# Patient Record
Sex: MALE | Race: BLACK OR AFRICAN AMERICAN | Employment: UNEMPLOYED | URBAN - METROPOLITAN AREA
[De-identification: names, ages, dates, MRNs, and addresses within clinical notes are randomized per-mention and may not be internally consistent; named-entity substitution may affect disease eponyms.]

---

## 2020-01-01 ENCOUNTER — TELEPHONE (OUTPATIENT)
Dept: GASTROENTEROLOGY | Age: 60
End: 2020-01-01

## 2020-01-01 ENCOUNTER — ANESTHESIA EVENT (OUTPATIENT)
Dept: ICU | Age: 60
DRG: 130 | End: 2020-01-01
Payer: COMMERCIAL

## 2020-01-01 ENCOUNTER — APPOINTMENT (OUTPATIENT)
Dept: ULTRASOUND IMAGING | Age: 60
DRG: 130 | End: 2020-01-01
Payer: COMMERCIAL

## 2020-01-01 ENCOUNTER — APPOINTMENT (OUTPATIENT)
Dept: CT IMAGING | Age: 60
DRG: 130 | End: 2020-01-01
Payer: COMMERCIAL

## 2020-01-01 ENCOUNTER — APPOINTMENT (OUTPATIENT)
Dept: MRI IMAGING | Age: 60
DRG: 130 | End: 2020-01-01
Payer: COMMERCIAL

## 2020-01-01 ENCOUNTER — APPOINTMENT (OUTPATIENT)
Dept: GENERAL RADIOLOGY | Age: 60
DRG: 130 | End: 2020-01-01
Payer: COMMERCIAL

## 2020-01-01 ENCOUNTER — HOSPITAL ENCOUNTER (INPATIENT)
Age: 60
LOS: 12 days | DRG: 130 | End: 2020-08-08
Attending: EMERGENCY MEDICINE | Admitting: INTERNAL MEDICINE
Payer: COMMERCIAL

## 2020-01-01 ENCOUNTER — ANESTHESIA (OUTPATIENT)
Dept: ICU | Age: 60
DRG: 130 | End: 2020-01-01
Payer: COMMERCIAL

## 2020-01-01 VITALS
HEIGHT: 70 IN | WEIGHT: 165.3 LBS | TEMPERATURE: 99.7 F | HEART RATE: 28 BPM | RESPIRATION RATE: 14 BRPM | BODY MASS INDEX: 23.66 KG/M2 | DIASTOLIC BLOOD PRESSURE: 18 MMHG | OXYGEN SATURATION: 65 % | SYSTOLIC BLOOD PRESSURE: 35 MMHG

## 2020-01-01 LAB
-: ABNORMAL
-: NORMAL
ABO/RH: NORMAL
ABSOLUTE EOS #: 0.09 K/UL (ref 0–0.44)
ABSOLUTE EOS #: 0.11 K/UL (ref 0–0.4)
ABSOLUTE IMMATURE GRANULOCYTE: 0 K/UL (ref 0–0.3)
ABSOLUTE IMMATURE GRANULOCYTE: <0.03 K/UL (ref 0–0.3)
ABSOLUTE LYMPH #: 1.88 K/UL (ref 1.1–3.7)
ABSOLUTE LYMPH #: 6.39 K/UL (ref 1–4.8)
ABSOLUTE MONO #: 0.74 K/UL (ref 0.1–0.8)
ABSOLUTE MONO #: 0.83 K/UL (ref 0.1–1.2)
ACETAMINOPHEN LEVEL: <5 UG/ML (ref 10–30)
ACTION: NORMAL
ACTION: NORMAL
ALBUMIN (CALCULATED): 2.4 G/DL (ref 3.2–5.2)
ALBUMIN PERCENT: 56 % (ref 45–65)
ALBUMIN SERPL-MCNC: 3.8 G/DL (ref 3.5–5.2)
ALBUMIN SERPL-MCNC: 4.2 G/DL (ref 3.5–5.2)
ALBUMIN/GLOBULIN RATIO: 1.1 (ref 1–2.5)
ALBUMIN/GLOBULIN RATIO: 1.1 (ref 1–2.5)
ALLEN TEST: ABNORMAL
ALLEN TEST: NEGATIVE
ALP BLD-CCNC: 117 U/L (ref 40–129)
ALP BLD-CCNC: 126 U/L (ref 40–129)
ALPHA 1 PERCENT: 4 % (ref 3–6)
ALPHA 2 PERCENT: 8 % (ref 6–13)
ALPHA-1-GLOBULIN: 0.2 G/DL (ref 0.1–0.4)
ALPHA-2-GLOBULIN: 0.3 G/DL (ref 0.5–0.9)
ALT SERPL-CCNC: 18 U/L (ref 5–41)
ALT SERPL-CCNC: 27 U/L (ref 5–41)
AMORPHOUS: ABNORMAL
AMPHETAMINE SCREEN URINE: NEGATIVE
ANION GAP SERPL CALCULATED.3IONS-SCNC: 15 MMOL/L (ref 9–17)
ANION GAP SERPL CALCULATED.3IONS-SCNC: 15 MMOL/L (ref 9–17)
ANION GAP SERPL CALCULATED.3IONS-SCNC: 16 MMOL/L (ref 9–17)
ANION GAP SERPL CALCULATED.3IONS-SCNC: 16 MMOL/L (ref 9–17)
ANION GAP SERPL CALCULATED.3IONS-SCNC: 17 MMOL/L (ref 9–17)
ANION GAP SERPL CALCULATED.3IONS-SCNC: 17 MMOL/L (ref 9–17)
ANION GAP SERPL CALCULATED.3IONS-SCNC: 18 MMOL/L (ref 9–17)
ANION GAP SERPL CALCULATED.3IONS-SCNC: 19 MMOL/L (ref 9–17)
ANION GAP SERPL CALCULATED.3IONS-SCNC: 20 MMOL/L (ref 9–17)
ANION GAP SERPL CALCULATED.3IONS-SCNC: 20 MMOL/L (ref 9–17)
ANION GAP SERPL CALCULATED.3IONS-SCNC: 22 MMOL/L (ref 9–17)
ANION GAP SERPL CALCULATED.3IONS-SCNC: 23 MMOL/L (ref 9–17)
ANION GAP SERPL CALCULATED.3IONS-SCNC: 24 MMOL/L (ref 9–17)
ANION GAP SERPL CALCULATED.3IONS-SCNC: 24 MMOL/L (ref 9–17)
ANION GAP SERPL CALCULATED.3IONS-SCNC: 25 MMOL/L (ref 9–17)
ANION GAP SERPL CALCULATED.3IONS-SCNC: 26 MMOL/L (ref 9–17)
ANION GAP SERPL CALCULATED.3IONS-SCNC: 26 MMOL/L (ref 9–17)
ANION GAP SERPL CALCULATED.3IONS-SCNC: 29 MMOL/L (ref 9–17)
ANION GAP SERPL CALCULATED.3IONS-SCNC: 30 MMOL/L (ref 9–17)
ANION GAP SERPL CALCULATED.3IONS-SCNC: 31 MMOL/L (ref 9–17)
ANION GAP: 13 MMOL/L (ref 7–16)
ANION GAP: 19 MMOL/L (ref 7–16)
ANION GAP: NORMAL MMOL/L (ref 7–16)
ANTI-NUCLEAR ANTIBODY (ANA): NEGATIVE
ANTIBODY SCREEN: NEGATIVE
ARM BAND NUMBER: NORMAL
AST SERPL-CCNC: 30 U/L
AST SERPL-CCNC: 44 U/L
BACTERIA: ABNORMAL
BARBITURATE SCREEN URINE: NEGATIVE
BASOPHILS # BLD: 0 % (ref 0–2)
BASOPHILS # BLD: 1 % (ref 0–2)
BASOPHILS ABSOLUTE: 0 K/UL (ref 0–0.2)
BASOPHILS ABSOLUTE: 0.03 K/UL (ref 0–0.2)
BENZODIAZEPINE SCREEN, URINE: NEGATIVE
BETA GLOBULIN: 0.5 G/DL (ref 0.5–1.1)
BETA PERCENT: 12 % (ref 11–19)
BETA-HYDROXYBUTYRATE: 0.46 MMOL/L (ref 0.02–0.27)
BILIRUB SERPL-MCNC: 0.56 MG/DL (ref 0.3–1.2)
BILIRUB SERPL-MCNC: 0.76 MG/DL (ref 0.3–1.2)
BILIRUBIN URINE: NEGATIVE
BLD PROD TYP BPU: NORMAL
BUN BLDV-MCNC: 11 MG/DL (ref 8–23)
BUN BLDV-MCNC: 13 MG/DL (ref 8–23)
BUN BLDV-MCNC: 136 MG/DL (ref 8–23)
BUN BLDV-MCNC: 172 MG/DL (ref 8–23)
BUN BLDV-MCNC: 204 MG/DL (ref 8–23)
BUN BLDV-MCNC: 217 MG/DL (ref 8–23)
BUN BLDV-MCNC: 237 MG/DL (ref 8–23)
BUN BLDV-MCNC: 28 MG/DL (ref 8–23)
BUN BLDV-MCNC: 29 MG/DL (ref 8–23)
BUN BLDV-MCNC: 31 MG/DL (ref 8–23)
BUN BLDV-MCNC: 32 MG/DL (ref 8–23)
BUN BLDV-MCNC: 33 MG/DL (ref 8–23)
BUN BLDV-MCNC: 41 MG/DL (ref 8–23)
BUN BLDV-MCNC: 65 MG/DL (ref 8–23)
BUN BLDV-MCNC: 99 MG/DL (ref 8–23)
BUN/CREAT BLD: ABNORMAL (ref 9–20)
BUPRENORPHINE URINE: NORMAL
C DIFF AG + TOXIN: NEGATIVE
CALCIUM SERPL-MCNC: 6.5 MG/DL (ref 8.6–10.4)
CALCIUM SERPL-MCNC: 6.5 MG/DL (ref 8.6–10.4)
CALCIUM SERPL-MCNC: 6.7 MG/DL (ref 8.6–10.4)
CALCIUM SERPL-MCNC: 6.9 MG/DL (ref 8.6–10.4)
CALCIUM SERPL-MCNC: 7 MG/DL (ref 8.6–10.4)
CALCIUM SERPL-MCNC: 7.1 MG/DL (ref 8.6–10.4)
CALCIUM SERPL-MCNC: 7.1 MG/DL (ref 8.6–10.4)
CALCIUM SERPL-MCNC: 7.4 MG/DL (ref 8.6–10.4)
CALCIUM SERPL-MCNC: 7.8 MG/DL (ref 8.6–10.4)
CALCIUM SERPL-MCNC: 8.5 MG/DL (ref 8.6–10.4)
CALCIUM SERPL-MCNC: 9 MG/DL (ref 8.6–10.4)
CALCIUM SERPL-MCNC: 9.4 MG/DL (ref 8.6–10.4)
CALCIUM SERPL-MCNC: 9.5 MG/DL (ref 8.6–10.4)
CALCIUM SERPL-MCNC: 9.7 MG/DL (ref 8.6–10.4)
CALCIUM SERPL-MCNC: 9.7 MG/DL (ref 8.6–10.4)
CAMPYLOBACTER PCR: NORMAL
CANNABINOID SCREEN URINE: NEGATIVE
CASTS UA: ABNORMAL /LPF (ref 0–8)
CHLORIDE BLD-SCNC: 100 MMOL/L (ref 98–107)
CHLORIDE BLD-SCNC: 104 MMOL/L (ref 98–107)
CHLORIDE BLD-SCNC: 106 MMOL/L (ref 98–107)
CHLORIDE BLD-SCNC: 107 MMOL/L (ref 98–107)
CHLORIDE BLD-SCNC: 108 MMOL/L (ref 98–107)
CHLORIDE BLD-SCNC: 109 MMOL/L (ref 98–107)
CHLORIDE BLD-SCNC: 109 MMOL/L (ref 98–107)
CHLORIDE BLD-SCNC: 110 MMOL/L (ref 98–107)
CHLORIDE BLD-SCNC: 111 MMOL/L (ref 98–107)
CHLORIDE BLD-SCNC: 114 MMOL/L (ref 98–107)
CHLORIDE BLD-SCNC: 117 MMOL/L (ref 98–107)
CHLORIDE BLD-SCNC: 118 MMOL/L (ref 98–107)
CHLORIDE BLD-SCNC: 96 MMOL/L (ref 98–107)
CHLORIDE BLD-SCNC: 97 MMOL/L (ref 98–107)
CHLORIDE BLD-SCNC: 98 MMOL/L (ref 98–107)
CHLORIDE BLD-SCNC: 99 MMOL/L (ref 98–107)
CHLORIDE BLD-SCNC: 99 MMOL/L (ref 98–107)
CHOLESTEROL/HDL RATIO: 6.3
CHOLESTEROL: 150 MG/DL
CO2: 14 MMOL/L (ref 20–31)
CO2: 15 MMOL/L (ref 20–31)
CO2: 15 MMOL/L (ref 20–31)
CO2: 16 MMOL/L (ref 20–31)
CO2: 17 MMOL/L (ref 20–31)
CO2: 18 MMOL/L (ref 20–31)
CO2: 18 MMOL/L (ref 20–31)
CO2: 19 MMOL/L (ref 20–31)
CO2: 20 MMOL/L (ref 20–31)
CO2: 21 MMOL/L (ref 20–31)
CO2: 21 MMOL/L (ref 20–31)
CO2: 22 MMOL/L (ref 20–31)
CO2: 22 MMOL/L (ref 20–31)
CO2: 7 MMOL/L (ref 20–31)
CO2: 7 MMOL/L (ref 20–31)
CO2: 8 MMOL/L (ref 20–31)
COCAINE METABOLITE, URINE: NEGATIVE
COLOR: YELLOW
COMPLEMENT C3: 70 MG/DL (ref 90–180)
COMPLEMENT C4: 14 MG/DL (ref 10–40)
CORTISOL COLLECTION INFO: ABNORMAL
CORTISOL COLLECTION INFO: ABNORMAL
CORTISOL: 2 UG/DL (ref 2.7–18.4)
CORTISOL: 29.8 UG/DL (ref 2.7–18.4)
CREAT SERPL-MCNC: 0.94 MG/DL (ref 0.7–1.2)
CREAT SERPL-MCNC: 1.24 MG/DL (ref 0.7–1.2)
CREAT SERPL-MCNC: 11.76 MG/DL (ref 0.7–1.2)
CREAT SERPL-MCNC: 12.34 MG/DL (ref 0.7–1.2)
CREAT SERPL-MCNC: 13.5 MG/DL (ref 0.7–1.2)
CREAT SERPL-MCNC: 3.22 MG/DL (ref 0.7–1.2)
CREAT SERPL-MCNC: 3.26 MG/DL (ref 0.7–1.2)
CREAT SERPL-MCNC: 3.67 MG/DL (ref 0.7–1.2)
CREAT SERPL-MCNC: 3.69 MG/DL (ref 0.7–1.2)
CREAT SERPL-MCNC: 3.82 MG/DL (ref 0.7–1.2)
CREAT SERPL-MCNC: 4.68 MG/DL (ref 0.7–1.2)
CREAT SERPL-MCNC: 6.04 MG/DL (ref 0.7–1.2)
CREAT SERPL-MCNC: 7.34 MG/DL (ref 0.7–1.2)
CREAT SERPL-MCNC: 9.08 MG/DL (ref 0.7–1.2)
CREAT SERPL-MCNC: 9.98 MG/DL (ref 0.7–1.2)
CREATININE URINE: 24.4 MG/DL (ref 39–259)
CROSSMATCH RESULT: NORMAL
CRYSTALS, UA: ABNORMAL /HPF
CULTURE: NORMAL
D-DIMER QUANTITATIVE: 0.71 MG/L FEU
D-DIMER QUANTITATIVE: 0.95 MG/L FEU
D-DIMER QUANTITATIVE: 10.29 MG/L FEU
D-DIMER QUANTITATIVE: 13.02 MG/L FEU
D-DIMER QUANTITATIVE: 24.85 MG/L FEU
D-DIMER QUANTITATIVE: 4.06 MG/L FEU
D-DIMER QUANTITATIVE: 4.86 MG/L FEU
D-DIMER QUANTITATIVE: 58.48 MG/L FEU
D-DIMER QUANTITATIVE: 7.35 MG/L FEU
D-DIMER QUANTITATIVE: 7.47 MG/L FEU
D-DIMER QUANTITATIVE: 7.55 MG/L FEU
DATE AND TIME: NORMAL
DATE AND TIME: NORMAL
DIFFERENTIAL TYPE: ABNORMAL
DIFFERENTIAL TYPE: ABNORMAL
DISPENSE STATUS BLOOD BANK: NORMAL
E COLI ENTEROTOXIGENIC PCR: NORMAL
EKG ATRIAL RATE: 112 BPM
EKG ATRIAL RATE: 150 BPM
EKG ATRIAL RATE: 182 BPM
EKG ATRIAL RATE: 69 BPM
EKG ATRIAL RATE: 83 BPM
EKG ATRIAL RATE: 85 BPM
EKG P AXIS: 60 DEGREES
EKG P AXIS: 68 DEGREES
EKG P AXIS: 84 DEGREES
EKG P AXIS: 9 DEGREES
EKG P-R INTERVAL: 152 MS
EKG P-R INTERVAL: 158 MS
EKG P-R INTERVAL: 174 MS
EKG P-R INTERVAL: 232 MS
EKG Q-T INTERVAL: 270 MS
EKG Q-T INTERVAL: 296 MS
EKG Q-T INTERVAL: 302 MS
EKG Q-T INTERVAL: 342 MS
EKG Q-T INTERVAL: 372 MS
EKG Q-T INTERVAL: 404 MS
EKG QRS DURATION: 82 MS
EKG QRS DURATION: 84 MS
EKG QRS DURATION: 88 MS
EKG QRS DURATION: 90 MS
EKG QRS DURATION: 90 MS
EKG QRS DURATION: 98 MS
EKG QTC CALCULATION (BAZETT): 404 MS
EKG QTC CALCULATION (BAZETT): 406 MS
EKG QTC CALCULATION (BAZETT): 423 MS
EKG QTC CALCULATION (BAZETT): 432 MS
EKG QTC CALCULATION (BAZETT): 437 MS
EKG QTC CALCULATION (BAZETT): 477 MS
EKG R AXIS: 24 DEGREES
EKG R AXIS: 50 DEGREES
EKG R AXIS: 55 DEGREES
EKG R AXIS: 59 DEGREES
EKG R AXIS: 64 DEGREES
EKG R AXIS: 65 DEGREES
EKG T AXIS: -109 DEGREES
EKG T AXIS: -15 DEGREES
EKG T AXIS: -40 DEGREES
EKG T AXIS: -86 DEGREES
EKG T AXIS: 22 DEGREES
EKG T AXIS: 93 DEGREES
EKG VENTRICULAR RATE: 112 BPM
EKG VENTRICULAR RATE: 148 BPM
EKG VENTRICULAR RATE: 150 BPM
EKG VENTRICULAR RATE: 69 BPM
EKG VENTRICULAR RATE: 83 BPM
EKG VENTRICULAR RATE: 85 BPM
EOSINOPHIL,URINE: NORMAL
EOSINOPHILS RELATIVE PERCENT: 1 % (ref 1–4)
EOSINOPHILS RELATIVE PERCENT: 2 % (ref 1–4)
EPITHELIAL CELLS UA: ABNORMAL /HPF (ref 0–5)
ESTIMATED AVERAGE GLUCOSE: 105 MG/DL
ETHANOL PERCENT: <0.01 %
ETHANOL: <10 MG/DL
EXPIRATION DATE: NORMAL
FERRITIN: 471 UG/L (ref 30–400)
FIBRINOGEN: 291 MG/DL (ref 140–420)
FIBRINOGEN: 316 MG/DL (ref 140–420)
FIBRINOGEN: 415 MG/DL (ref 140–420)
FIBRINOGEN: 428 MG/DL (ref 140–420)
FIBRINOGEN: 429 MG/DL (ref 140–420)
FIBRINOGEN: 462 MG/DL (ref 140–420)
FIBRINOGEN: 471 MG/DL (ref 140–420)
FIBRINOGEN: 472 MG/DL (ref 140–420)
FIO2: 100
FIO2: 100
FIO2: 30
FIO2: 40
FIO2: 50
FIO2: ABNORMAL
FIO2: ABNORMAL
FREE KAPPA/LAMBDA RATIO: 2.06 (ref 0.26–1.65)
GAMMA GLOBULIN %: 20 % (ref 9–20)
GAMMA GLOBULIN: 0.9 G/DL (ref 0.5–1.5)
GFR AFRICAN AMERICAN: 12 ML/MIN
GFR AFRICAN AMERICAN: 16 ML/MIN
GFR AFRICAN AMERICAN: 20 ML/MIN
GFR AFRICAN AMERICAN: 20 ML/MIN
GFR AFRICAN AMERICAN: 21 ML/MIN
GFR AFRICAN AMERICAN: 24 ML/MIN
GFR AFRICAN AMERICAN: 24 ML/MIN
GFR AFRICAN AMERICAN: 5 ML/MIN
GFR AFRICAN AMERICAN: 6 ML/MIN
GFR AFRICAN AMERICAN: 7 ML/MIN
GFR AFRICAN AMERICAN: 9 ML/MIN
GFR AFRICAN AMERICAN: >60 ML/MIN
GFR AFRICAN AMERICAN: >60 ML/MIN
GFR NON-AFRICAN AMERICAN: 10 ML/MIN
GFR NON-AFRICAN AMERICAN: 13 ML/MIN
GFR NON-AFRICAN AMERICAN: 14 ML/MIN
GFR NON-AFRICAN AMERICAN: 16 ML/MIN
GFR NON-AFRICAN AMERICAN: 16 ML/MIN
GFR NON-AFRICAN AMERICAN: 17 ML/MIN
GFR NON-AFRICAN AMERICAN: 17 ML/MIN
GFR NON-AFRICAN AMERICAN: 19 ML/MIN
GFR NON-AFRICAN AMERICAN: 20 ML/MIN
GFR NON-AFRICAN AMERICAN: 4 ML/MIN
GFR NON-AFRICAN AMERICAN: 47 ML/MIN
GFR NON-AFRICAN AMERICAN: 5 ML/MIN
GFR NON-AFRICAN AMERICAN: 59 ML/MIN
GFR NON-AFRICAN AMERICAN: 6 ML/MIN
GFR NON-AFRICAN AMERICAN: 8 ML/MIN
GFR NON-AFRICAN AMERICAN: >60 ML/MIN
GFR SERPL CREATININE-BSD FRML MDRD: 17 ML/MIN
GFR SERPL CREATININE-BSD FRML MDRD: 20 ML/MIN
GFR SERPL CREATININE-BSD FRML MDRD: 57 ML/MIN
GFR SERPL CREATININE-BSD FRML MDRD: ABNORMAL ML/MIN/{1.73_M2}
GLUCOSE BLD-MCNC: 100 MG/DL (ref 75–110)
GLUCOSE BLD-MCNC: 103 MG/DL (ref 75–110)
GLUCOSE BLD-MCNC: 107 MG/DL (ref 75–110)
GLUCOSE BLD-MCNC: 11 MG/DL (ref 70–99)
GLUCOSE BLD-MCNC: 113 MG/DL (ref 75–110)
GLUCOSE BLD-MCNC: 114 MG/DL (ref 75–110)
GLUCOSE BLD-MCNC: 118 MG/DL (ref 74–100)
GLUCOSE BLD-MCNC: 122 MG/DL (ref 70–99)
GLUCOSE BLD-MCNC: 125 MG/DL (ref 75–110)
GLUCOSE BLD-MCNC: 133 MG/DL (ref 75–110)
GLUCOSE BLD-MCNC: 134 MG/DL (ref 75–110)
GLUCOSE BLD-MCNC: 136 MG/DL (ref 75–110)
GLUCOSE BLD-MCNC: 140 MG/DL (ref 75–110)
GLUCOSE BLD-MCNC: 140 MG/DL (ref 75–110)
GLUCOSE BLD-MCNC: 145 MG/DL (ref 74–100)
GLUCOSE BLD-MCNC: 150 MG/DL (ref 70–99)
GLUCOSE BLD-MCNC: 153 MG/DL (ref 75–110)
GLUCOSE BLD-MCNC: 156 MG/DL (ref 70–99)
GLUCOSE BLD-MCNC: 159 MG/DL (ref 75–110)
GLUCOSE BLD-MCNC: 162 MG/DL (ref 75–110)
GLUCOSE BLD-MCNC: 165 MG/DL (ref 75–110)
GLUCOSE BLD-MCNC: 171 MG/DL (ref 75–110)
GLUCOSE BLD-MCNC: 176 MG/DL (ref 75–110)
GLUCOSE BLD-MCNC: 178 MG/DL (ref 75–110)
GLUCOSE BLD-MCNC: 184 MG/DL (ref 70–99)
GLUCOSE BLD-MCNC: 215 MG/DL (ref 75–110)
GLUCOSE BLD-MCNC: 218 MG/DL (ref 75–110)
GLUCOSE BLD-MCNC: 237 MG/DL (ref 75–110)
GLUCOSE BLD-MCNC: 246 MG/DL (ref 75–110)
GLUCOSE BLD-MCNC: 255 MG/DL (ref 74–100)
GLUCOSE BLD-MCNC: 261 MG/DL (ref 70–99)
GLUCOSE BLD-MCNC: 262 MG/DL (ref 74–100)
GLUCOSE BLD-MCNC: 280 MG/DL (ref 75–110)
GLUCOSE BLD-MCNC: 285 MG/DL (ref 70–99)
GLUCOSE BLD-MCNC: 341 MG/DL (ref 74–100)
GLUCOSE BLD-MCNC: 350 MG/DL (ref 70–99)
GLUCOSE BLD-MCNC: 352 MG/DL (ref 70–99)
GLUCOSE BLD-MCNC: 362 MG/DL (ref 74–100)
GLUCOSE BLD-MCNC: 59 MG/DL (ref 75–110)
GLUCOSE BLD-MCNC: 70 MG/DL (ref 70–99)
GLUCOSE BLD-MCNC: 72 MG/DL (ref 70–99)
GLUCOSE BLD-MCNC: 76 MG/DL (ref 75–110)
GLUCOSE BLD-MCNC: 77 MG/DL (ref 74–100)
GLUCOSE BLD-MCNC: 78 MG/DL (ref 75–110)
GLUCOSE BLD-MCNC: 84 MG/DL (ref 75–110)
GLUCOSE BLD-MCNC: 85 MG/DL (ref 74–100)
GLUCOSE BLD-MCNC: 85 MG/DL (ref 75–110)
GLUCOSE BLD-MCNC: 86 MG/DL (ref 75–110)
GLUCOSE BLD-MCNC: 87 MG/DL (ref 75–110)
GLUCOSE BLD-MCNC: 88 MG/DL (ref 75–110)
GLUCOSE BLD-MCNC: 92 MG/DL (ref 70–99)
GLUCOSE BLD-MCNC: 92 MG/DL (ref 70–99)
GLUCOSE BLD-MCNC: 94 MG/DL (ref 70–99)
GLUCOSE BLD-MCNC: 94 MG/DL (ref 70–99)
GLUCOSE BLD-MCNC: 95 MG/DL (ref 75–110)
GLUCOSE BLD-MCNC: 96 MG/DL (ref 75–110)
GLUCOSE BLD-MCNC: 99 MG/DL (ref 75–110)
GLUCOSE BLD-MCNC: <20 MG/DL (ref 74–100)
GLUCOSE URINE: NEGATIVE
HBA1C MFR BLD: 5.3 % (ref 4–6)
HCO3 VENOUS: 21.8 MMOL/L (ref 22–29)
HCT VFR BLD CALC: 21.5 % (ref 40.7–50.3)
HCT VFR BLD CALC: 21.8 % (ref 40.7–50.3)
HCT VFR BLD CALC: 22.1 % (ref 40.7–50.3)
HCT VFR BLD CALC: 22.2 % (ref 40.7–50.3)
HCT VFR BLD CALC: 22.6 % (ref 40.7–50.3)
HCT VFR BLD CALC: 23.5 % (ref 40.7–50.3)
HCT VFR BLD CALC: 24.9 % (ref 40.7–50.3)
HCT VFR BLD CALC: 33.4 % (ref 40.7–50.3)
HCT VFR BLD CALC: 36.5 % (ref 40.7–50.3)
HCT VFR BLD CALC: 43.8 % (ref 40.7–50.3)
HCT VFR BLD CALC: 46.5 % (ref 40.7–50.3)
HCT VFR BLD CALC: 47.1 % (ref 40.7–50.3)
HDLC SERPL-MCNC: 24 MG/DL
HEMOGLOBIN: 11.1 G/DL (ref 13–17)
HEMOGLOBIN: 12.5 G/DL (ref 13–17)
HEMOGLOBIN: 13.8 G/DL (ref 13–17)
HEMOGLOBIN: 14.5 G/DL (ref 13–17)
HEMOGLOBIN: 15.6 G/DL (ref 13–17)
HEMOGLOBIN: 6.8 G/DL (ref 13–17)
HEMOGLOBIN: 7.2 G/DL (ref 13–17)
HEMOGLOBIN: 7.2 G/DL (ref 13–17)
HEMOGLOBIN: 7.4 G/DL (ref 13–17)
HEMOGLOBIN: 7.4 G/DL (ref 13–17)
HEMOGLOBIN: 7.7 G/DL (ref 13–17)
HEMOGLOBIN: 8 G/DL (ref 13–17)
IMMATURE GRANULOCYTES: 0 %
IMMATURE GRANULOCYTES: 0 %
INR BLD: 1
INR BLD: 1.1
KAPPA FREE LIGHT CHAINS QNT: 11.12 MG/DL (ref 0.37–1.94)
KETONES, URINE: NEGATIVE
KETONES, URINE: NEGATIVE
LACTATE DEHYDROGENASE: 243 U/L (ref 135–225)
LACTIC ACID, SEPSIS WHOLE BLOOD: 11.1 MMOL/L (ref 0.5–1.9)
LACTIC ACID, SEPSIS WHOLE BLOOD: 11.1 MMOL/L (ref 0.5–1.9)
LACTIC ACID, SEPSIS WHOLE BLOOD: 2 MMOL/L (ref 0.5–1.9)
LACTIC ACID, SEPSIS WHOLE BLOOD: 2 MMOL/L (ref 0.5–1.9)
LACTIC ACID, SEPSIS: ABNORMAL MMOL/L (ref 0.5–1.9)
LACTIC ACID, WHOLE BLOOD: 10.3 MMOL/L (ref 0.7–2.1)
LACTIC ACID, WHOLE BLOOD: 2.6 MMOL/L (ref 0.7–2.1)
LACTIC ACID, WHOLE BLOOD: 5.4 MMOL/L (ref 0.7–2.1)
LAMBDA FREE LIGHT CHAINS QNT: 5.4 MG/DL (ref 0.57–2.63)
LDL CHOLESTEROL: 100 MG/DL (ref 0–130)
LEUKOCYTE ESTERASE, URINE: NEGATIVE
LYMPHOCYTES # BLD: 35 % (ref 24–43)
LYMPHOCYTES # BLD: 61 % (ref 24–44)
Lab: NORMAL
MAGNESIUM: 1.4 MG/DL (ref 1.6–2.6)
MAGNESIUM: 1.5 MG/DL (ref 1.6–2.6)
MAGNESIUM: 1.7 MG/DL (ref 1.6–2.6)
MAGNESIUM: 2.3 MG/DL (ref 1.6–2.6)
MAGNESIUM: 2.7 MG/DL (ref 1.6–2.6)
MCH RBC QN AUTO: 27.4 PG (ref 25.2–33.5)
MCH RBC QN AUTO: 27.7 PG (ref 25.2–33.5)
MCH RBC QN AUTO: 27.7 PG (ref 25.2–33.5)
MCH RBC QN AUTO: 28.1 PG (ref 25.2–33.5)
MCH RBC QN AUTO: 28.1 PG (ref 25.2–33.5)
MCH RBC QN AUTO: 28.2 PG (ref 25.2–33.5)
MCH RBC QN AUTO: 28.4 PG (ref 25.2–33.5)
MCH RBC QN AUTO: 28.4 PG (ref 25.2–33.5)
MCH RBC QN AUTO: 28.5 PG (ref 25.2–33.5)
MCH RBC QN AUTO: 29.1 PG (ref 25.2–33.5)
MCHC RBC AUTO-ENTMCNC: 29.7 G/DL (ref 28.4–34.8)
MCHC RBC AUTO-ENTMCNC: 31.6 G/DL (ref 28.4–34.8)
MCHC RBC AUTO-ENTMCNC: 32.1 G/DL (ref 28.4–34.8)
MCHC RBC AUTO-ENTMCNC: 32.4 G/DL (ref 28.4–34.8)
MCHC RBC AUTO-ENTMCNC: 32.7 G/DL (ref 28.4–34.8)
MCHC RBC AUTO-ENTMCNC: 32.8 G/DL (ref 28.4–34.8)
MCHC RBC AUTO-ENTMCNC: 33 G/DL (ref 28.4–34.8)
MCHC RBC AUTO-ENTMCNC: 33.1 G/DL (ref 28.4–34.8)
MCHC RBC AUTO-ENTMCNC: 33.1 G/DL (ref 28.4–34.8)
MCHC RBC AUTO-ENTMCNC: 33.2 G/DL (ref 28.4–34.8)
MCHC RBC AUTO-ENTMCNC: 33.5 G/DL (ref 28.4–34.8)
MCHC RBC AUTO-ENTMCNC: 34.2 G/DL (ref 28.4–34.8)
MCV RBC AUTO: 82.5 FL (ref 82.6–102.9)
MCV RBC AUTO: 83 FL (ref 82.6–102.9)
MCV RBC AUTO: 85.2 FL (ref 82.6–102.9)
MCV RBC AUTO: 85.4 FL (ref 82.6–102.9)
MCV RBC AUTO: 85.5 FL (ref 82.6–102.9)
MCV RBC AUTO: 85.6 FL (ref 82.6–102.9)
MCV RBC AUTO: 85.9 FL (ref 82.6–102.9)
MCV RBC AUTO: 87 FL (ref 82.6–102.9)
MCV RBC AUTO: 87 FL (ref 82.6–102.9)
MCV RBC AUTO: 87.4 FL (ref 82.6–102.9)
MCV RBC AUTO: 89.2 FL (ref 82.6–102.9)
MCV RBC AUTO: 93.4 FL (ref 82.6–102.9)
MDMA URINE: NORMAL
METHADONE SCREEN, URINE: NEGATIVE
METHAMPHETAMINE, URINE: NORMAL
MODE: ABNORMAL
MONOCYTES # BLD: 16 % (ref 3–12)
MONOCYTES # BLD: 7 % (ref 1–7)
MORPHOLOGY: ABNORMAL
MUCUS: ABNORMAL
NEGATIVE BASE EXCESS, ART: 16 (ref 0–2)
NEGATIVE BASE EXCESS, ART: 17 (ref 0–2)
NEGATIVE BASE EXCESS, ART: 18 (ref 0–2)
NEGATIVE BASE EXCESS, ART: 3 (ref 0–2)
NEGATIVE BASE EXCESS, ART: 5 (ref 0–2)
NEGATIVE BASE EXCESS, ART: 6 (ref 0–2)
NEGATIVE BASE EXCESS, ART: 6 (ref 0–2)
NEGATIVE BASE EXCESS, ART: 7 (ref 0–2)
NEGATIVE BASE EXCESS, ART: 9 (ref 0–2)
NEGATIVE BASE EXCESS, ART: ABNORMAL (ref 0–2)
NEGATIVE BASE EXCESS, VEN: 4 (ref 0–2)
NITRITE, URINE: NEGATIVE
NOTIFY: NORMAL
NOTIFY: NORMAL
NRBC AUTOMATED: 0 PER 100 WBC
NRBC AUTOMATED: 0 PER 100 WBC
NRBC AUTOMATED: 0.1 PER 100 WBC
NRBC AUTOMATED: 0.2 PER 100 WBC
NRBC AUTOMATED: 0.3 PER 100 WBC
NRBC AUTOMATED: 0.3 PER 100 WBC
NRBC AUTOMATED: 0.4 PER 100 WBC
O2 DEVICE/FLOW/%: ABNORMAL
O2 SAT, VEN: 36 % (ref 60–85)
OPIATES, URINE: NEGATIVE
OTHER OBSERVATIONS UA: ABNORMAL
OXYCODONE SCREEN URINE: NEGATIVE
PARTIAL THROMBOPLASTIN TIME: 24.7 SEC (ref 20.5–30.5)
PARTIAL THROMBOPLASTIN TIME: 24.9 SEC (ref 20.5–30.5)
PARTIAL THROMBOPLASTIN TIME: 26.3 SEC (ref 20.5–30.5)
PARTIAL THROMBOPLASTIN TIME: 27.9 SEC (ref 20.5–30.5)
PARTIAL THROMBOPLASTIN TIME: 28.8 SEC (ref 20.5–30.5)
PARTIAL THROMBOPLASTIN TIME: 29.6 SEC (ref 20.5–30.5)
PARTIAL THROMBOPLASTIN TIME: 30 SEC (ref 20.5–30.5)
PARTIAL THROMBOPLASTIN TIME: 32.4 SEC (ref 20.5–30.5)
PARTIAL THROMBOPLASTIN TIME: 34.6 SEC (ref 20.5–30.5)
PARTIAL THROMBOPLASTIN TIME: 35.4 SEC (ref 20.5–30.5)
PARTIAL THROMBOPLASTIN TIME: 37.7 SEC (ref 20.5–30.5)
PARTIAL THROMBOPLASTIN TIME: 41.4 SEC (ref 20.5–30.5)
PATHOLOGIST: ABNORMAL
PATHOLOGIST: NORMAL
PATIENT TEMP: 35.5
PATIENT TEMP: 35.9
PATIENT TEMP: 36
PATIENT TEMP: 36.9
PATIENT TEMP: 37.3
PATIENT TEMP: ABNORMAL
PCO2, VEN: 40.2 MM HG (ref 41–51)
PDW BLD-RTO: 14.2 % (ref 11.8–14.4)
PDW BLD-RTO: 14.4 % (ref 11.8–14.4)
PDW BLD-RTO: 14.8 % (ref 11.8–14.4)
PDW BLD-RTO: 14.9 % (ref 11.8–14.4)
PDW BLD-RTO: 14.9 % (ref 11.8–14.4)
PDW BLD-RTO: 15 % (ref 11.8–14.4)
PDW BLD-RTO: 15.1 % (ref 11.8–14.4)
PDW BLD-RTO: 15.3 % (ref 11.8–14.4)
PDW BLD-RTO: 15.4 % (ref 11.8–14.4)
PDW BLD-RTO: 15.4 % (ref 11.8–14.4)
PDW BLD-RTO: 15.5 % (ref 11.8–14.4)
PDW BLD-RTO: 15.7 % (ref 11.8–14.4)
PH UA: 5 (ref 5–8)
PH VENOUS: 7.34 (ref 7.32–7.43)
PHENCYCLIDINE, URINE: NEGATIVE
PHOSPHORUS: 2.3 MG/DL (ref 2.5–4.5)
PLATELET # BLD: 151 K/UL (ref 138–453)
PLATELET # BLD: 198 K/UL (ref 138–453)
PLATELET # BLD: 208 K/UL (ref 138–453)
PLATELET # BLD: 226 K/UL (ref 138–453)
PLATELET # BLD: 227 K/UL (ref 138–453)
PLATELET # BLD: 232 K/UL (ref 138–453)
PLATELET # BLD: 255 K/UL (ref 138–453)
PLATELET # BLD: 79 K/UL (ref 138–453)
PLATELET # BLD: ABNORMAL K/UL (ref 138–453)
PLATELET ESTIMATE: ABNORMAL
PLATELET ESTIMATE: ABNORMAL
PLATELET, FLUORESCENCE: 46 K/UL (ref 138–453)
PLATELET, FLUORESCENCE: 47 K/UL (ref 138–453)
PLATELET, FLUORESCENCE: 49 K/UL (ref 138–453)
PLATELET, FLUORESCENCE: 82 K/UL (ref 138–453)
PLATELET, IMMATURE FRACTION: 4.3 % (ref 1.1–10.3)
PLATELET, IMMATURE FRACTION: 5.4 % (ref 1.1–10.3)
PLATELET, IMMATURE FRACTION: 5.8 % (ref 1.1–10.3)
PLATELET, IMMATURE FRACTION: 6.4 % (ref 1.1–10.3)
PLESIOMONAS SHIGELLOIDES PCR: NORMAL
PMV BLD AUTO: 10 FL (ref 8.1–13.5)
PMV BLD AUTO: 10.1 FL (ref 8.1–13.5)
PMV BLD AUTO: 10.1 FL (ref 8.1–13.5)
PMV BLD AUTO: 10.2 FL (ref 8.1–13.5)
PMV BLD AUTO: 10.7 FL (ref 8.1–13.5)
PMV BLD AUTO: 11.6 FL (ref 8.1–13.5)
PMV BLD AUTO: 12.6 FL (ref 8.1–13.5)
PMV BLD AUTO: 9.3 FL (ref 8.1–13.5)
PMV BLD AUTO: ABNORMAL FL (ref 8.1–13.5)
PO2, VEN: 22.6 MM HG (ref 30–50)
POC CHLORIDE: 111 MMOL/L (ref 98–107)
POC CHLORIDE: 121 MMOL/L (ref 98–107)
POC CHLORIDE: 126 MMOL/L (ref 98–107)
POC CREATININE: 1.51 MG/DL (ref 0.51–1.19)
POC CREATININE: 3.83 MG/DL (ref 0.51–1.19)
POC CREATININE: 4.33 MG/DL (ref 0.51–1.19)
POC HCO3: 10.6 MMOL/L (ref 21–28)
POC HCO3: 11.1 MMOL/L (ref 21–28)
POC HCO3: 11.5 MMOL/L (ref 21–28)
POC HCO3: 14.9 MMOL/L (ref 21–28)
POC HCO3: 16.7 MMOL/L (ref 21–28)
POC HCO3: 17.5 MMOL/L (ref 21–28)
POC HCO3: 18 MMOL/L (ref 21–28)
POC HCO3: 18.2 MMOL/L (ref 21–28)
POC HCO3: 20.3 MMOL/L (ref 21–28)
POC HCO3: 22.7 MMOL/L (ref 21–28)
POC HCO3: 22.9 MMOL/L (ref 21–28)
POC HCO3: 24.2 MMOL/L (ref 21–28)
POC HEMATOCRIT: 31 % (ref 41–53)
POC HEMATOCRIT: 31 % (ref 41–53)
POC HEMATOCRIT: 45 % (ref 41–53)
POC HEMOGLOBIN: 10.4 G/DL (ref 13.5–17.5)
POC HEMOGLOBIN: 10.4 G/DL (ref 13.5–17.5)
POC HEMOGLOBIN: 15.4 G/DL (ref 13.5–17.5)
POC IONIZED CALCIUM: 0.66 MMOL/L (ref 1.15–1.33)
POC IONIZED CALCIUM: 1.54 MMOL/L (ref 1.15–1.33)
POC IONIZED CALCIUM: 1.67 MMOL/L (ref 1.15–1.33)
POC LACTIC ACID: 0.9 MMOL/L (ref 0.56–1.39)
POC LACTIC ACID: 1.68 MMOL/L (ref 0.56–1.39)
POC LACTIC ACID: 5.45 MMOL/L (ref 0.56–1.39)
POC LACTIC ACID: 7.2 MMOL/L (ref 0.56–1.39)
POC LACTIC ACID: 8.66 MMOL/L (ref 0.56–1.39)
POC O2 SATURATION: 100 % (ref 94–98)
POC O2 SATURATION: 93 % (ref 94–98)
POC O2 SATURATION: 94 % (ref 94–98)
POC O2 SATURATION: 98 % (ref 94–98)
POC PCO2 TEMP: 29 MM HG
POC PCO2 TEMP: 30 MM HG
POC PCO2 TEMP: ABNORMAL MM HG
POC PCO2: 24.9 MM HG (ref 35–48)
POC PCO2: 25 MM HG (ref 35–48)
POC PCO2: 26.7 MM HG (ref 35–48)
POC PCO2: 27.5 MM HG (ref 35–48)
POC PCO2: 27.7 MM HG (ref 35–48)
POC PCO2: 28.1 MM HG (ref 35–48)
POC PCO2: 28.5 MM HG (ref 35–48)
POC PCO2: 29.4 MM HG (ref 35–48)
POC PCO2: 30.9 MM HG (ref 35–48)
POC PCO2: 31.7 MM HG (ref 35–48)
POC PCO2: 33.9 MM HG (ref 35–48)
POC PCO2: 38.1 MM HG (ref 35–48)
POC PH TEMP: 7.39
POC PH TEMP: 7.54
POC PH TEMP: ABNORMAL
POC PH: 7.07 (ref 7.35–7.45)
POC PH: 7.14 (ref 7.35–7.45)
POC PH: 7.17 (ref 7.35–7.45)
POC PH: 7.36 (ref 7.35–7.45)
POC PH: 7.38 (ref 7.35–7.45)
POC PH: 7.41 (ref 7.35–7.45)
POC PH: 7.43 (ref 7.35–7.45)
POC PH: 7.44 (ref 7.35–7.45)
POC PH: 7.46 (ref 7.35–7.45)
POC PH: 7.48 (ref 7.35–7.45)
POC PH: 7.52 (ref 7.35–7.45)
POC PH: 7.54 (ref 7.35–7.45)
POC PO2 TEMP: 60 MM HG
POC PO2 TEMP: 98 MM HG
POC PO2 TEMP: ABNORMAL MM HG
POC PO2: 101.5 MM HG (ref 83–108)
POC PO2: 106.7 MM HG (ref 83–108)
POC PO2: 153.1 MM HG (ref 83–108)
POC PO2: 173.3 MM HG (ref 83–108)
POC PO2: 175 MM HG (ref 83–108)
POC PO2: 203.2 MM HG (ref 83–108)
POC PO2: 427.4 MM HG (ref 83–108)
POC PO2: 66.9 MM HG (ref 83–108)
POC PO2: 92 MM HG (ref 83–108)
POC PO2: 96.7 MM HG (ref 83–108)
POC PO2: 98.9 MM HG (ref 83–108)
POC PO2: 99.4 MM HG (ref 83–108)
POC POTASSIUM: 3.2 MMOL/L (ref 3.5–4.5)
POC POTASSIUM: 3.6 MMOL/L (ref 3.5–4.5)
POC POTASSIUM: >12 MMOL/L (ref 3.5–4.5)
POC SODIUM: 115 MMOL/L (ref 138–146)
POC SODIUM: 150 MMOL/L (ref 138–146)
POC SODIUM: 151 MMOL/L (ref 138–146)
POSITIVE BASE EXCESS, ART: 0 (ref 0–3)
POSITIVE BASE EXCESS, ART: 0 (ref 0–3)
POSITIVE BASE EXCESS, ART: 2 (ref 0–3)
POSITIVE BASE EXCESS, ART: ABNORMAL (ref 0–3)
POSITIVE BASE EXCESS, VEN: ABNORMAL (ref 0–3)
POTASSIUM SERPL-SCNC: 2.1 MMOL/L (ref 3.7–5.3)
POTASSIUM SERPL-SCNC: 3.5 MMOL/L (ref 3.7–5.3)
POTASSIUM SERPL-SCNC: 3.5 MMOL/L (ref 3.7–5.3)
POTASSIUM SERPL-SCNC: 3.6 MMOL/L (ref 3.7–5.3)
POTASSIUM SERPL-SCNC: 3.7 MMOL/L (ref 3.7–5.3)
POTASSIUM SERPL-SCNC: 4 MMOL/L (ref 3.7–5.3)
POTASSIUM SERPL-SCNC: 4.3 MMOL/L (ref 3.7–5.3)
POTASSIUM SERPL-SCNC: 4.4 MMOL/L (ref 3.7–5.3)
POTASSIUM SERPL-SCNC: 4.5 MMOL/L (ref 3.7–5.3)
POTASSIUM SERPL-SCNC: 4.5 MMOL/L (ref 3.7–5.3)
POTASSIUM SERPL-SCNC: 4.6 MMOL/L (ref 3.7–5.3)
POTASSIUM SERPL-SCNC: 4.7 MMOL/L (ref 3.7–5.3)
POTASSIUM SERPL-SCNC: 4.8 MMOL/L (ref 3.7–5.3)
POTASSIUM SERPL-SCNC: 4.9 MMOL/L (ref 3.7–5.3)
POTASSIUM SERPL-SCNC: 5 MMOL/L (ref 3.7–5.3)
POTASSIUM SERPL-SCNC: 5.1 MMOL/L (ref 3.7–5.3)
POTASSIUM SERPL-SCNC: 5.5 MMOL/L (ref 3.7–5.3)
POTASSIUM SERPL-SCNC: 5.5 MMOL/L (ref 3.7–5.3)
POTASSIUM SERPL-SCNC: 6.4 MMOL/L (ref 3.7–5.3)
PROCALCITONIN: 0.19 NG/ML
PROPOXYPHENE, URINE: NORMAL
PROTEIN ELECTROPHORESIS, SERUM: ABNORMAL
PROTEIN UA: NEGATIVE
PROTHROMBIN TIME: 10.9 SEC (ref 9–12)
PROTHROMBIN TIME: 11.1 SEC (ref 9–12)
RBC # BLD: 2.41 M/UL (ref 4.21–5.77)
RBC # BLD: 2.54 M/UL (ref 4.21–5.77)
RBC # BLD: 2.55 M/UL (ref 4.21–5.77)
RBC # BLD: 2.6 M/UL (ref 4.21–5.77)
RBC # BLD: 2.63 M/UL (ref 4.21–5.77)
RBC # BLD: 2.7 M/UL (ref 4.21–5.77)
RBC # BLD: 2.85 M/UL (ref 4.21–5.77)
RBC # BLD: 4.05 M/UL (ref 4.21–5.77)
RBC # BLD: 4.4 M/UL (ref 4.21–5.77)
RBC # BLD: 4.98 M/UL (ref 4.21–5.77)
RBC # BLD: 5.14 M/UL (ref 4.21–5.77)
RBC # BLD: 5.5 M/UL (ref 4.21–5.77)
RBC # BLD: ABNORMAL 10*6/UL
RBC # BLD: ABNORMAL 10*6/UL
RBC UA: ABNORMAL /HPF (ref 0–4)
READ BACK: YES
READ BACK: YES
REASON FOR REJECTION: NORMAL
RENAL EPITHELIAL, UA: ABNORMAL /HPF
SALICYLATE LEVEL: <1 MG/DL (ref 3–10)
SALMONELLA PCR: NORMAL
SAMPLE SITE: ABNORMAL
SARS-COV-2, PCR: ABNORMAL
SARS-COV-2, PCR: ABNORMAL
SARS-COV-2, RAPID: DETECTED
SARS-COV-2, RAPID: DETECTED
SARS-COV-2: ABNORMAL
SARS-COV-2: ABNORMAL
SEG NEUTROPHILS: 31 % (ref 36–66)
SEG NEUTROPHILS: 46 % (ref 36–65)
SEGMENTED NEUTROPHILS ABSOLUTE COUNT: 2.49 K/UL (ref 1.5–8.1)
SEGMENTED NEUTROPHILS ABSOLUTE COUNT: 3.26 K/UL (ref 1.8–7.7)
SERUM IFX INTERP: NORMAL
SHIGATOXIN GENE PCR: NORMAL
SHIGELLA SP PCR: NORMAL
SODIUM BLD-SCNC: 134 MMOL/L (ref 135–144)
SODIUM BLD-SCNC: 135 MMOL/L (ref 135–144)
SODIUM BLD-SCNC: 135 MMOL/L (ref 135–144)
SODIUM BLD-SCNC: 137 MMOL/L (ref 135–144)
SODIUM BLD-SCNC: 138 MMOL/L (ref 135–144)
SODIUM BLD-SCNC: 138 MMOL/L (ref 135–144)
SODIUM BLD-SCNC: 139 MMOL/L (ref 135–144)
SODIUM BLD-SCNC: 140 MMOL/L (ref 135–144)
SODIUM BLD-SCNC: 141 MMOL/L (ref 135–144)
SODIUM BLD-SCNC: 142 MMOL/L (ref 135–144)
SODIUM BLD-SCNC: 142 MMOL/L (ref 135–144)
SODIUM BLD-SCNC: 144 MMOL/L (ref 135–144)
SODIUM BLD-SCNC: 144 MMOL/L (ref 135–144)
SODIUM BLD-SCNC: 145 MMOL/L (ref 135–144)
SODIUM BLD-SCNC: 145 MMOL/L (ref 135–144)
SODIUM BLD-SCNC: 146 MMOL/L (ref 135–144)
SODIUM BLD-SCNC: 147 MMOL/L (ref 135–144)
SODIUM BLD-SCNC: 148 MMOL/L (ref 135–144)
SODIUM BLD-SCNC: 149 MMOL/L (ref 135–144)
SODIUM BLD-SCNC: 150 MMOL/L (ref 135–144)
SOURCE: ABNORMAL
SOURCE: ABNORMAL
SPECIFIC GRAVITY UA: 1.05 (ref 1–1.03)
SPECIMEN DESCRIPTION: NORMAL
TCO2 (CALC), ART: 12 MMOL/L (ref 22–29)
TCO2 (CALC), ART: 12 MMOL/L (ref 22–29)
TCO2 (CALC), ART: 13 MMOL/L (ref 22–29)
TCO2 (CALC), ART: 16 MMOL/L (ref 22–29)
TCO2 (CALC), ART: 18 MMOL/L (ref 22–29)
TCO2 (CALC), ART: 18 MMOL/L (ref 22–29)
TCO2 (CALC), ART: 19 MMOL/L (ref 22–29)
TCO2 (CALC), ART: 19 MMOL/L (ref 22–29)
TCO2 (CALC), ART: 21 MMOL/L (ref 22–29)
TCO2 (CALC), ART: 24 MMOL/L (ref 22–29)
TCO2 (CALC), ART: 24 MMOL/L (ref 22–29)
TCO2 (CALC), ART: 25 MMOL/L (ref 22–29)
TEST INFORMATION: NORMAL
THYROXINE, FREE: 0.98 NG/DL (ref 0.93–1.7)
THYROXINE, FREE: 1.93 NG/DL (ref 0.93–1.7)
TOTAL CO2, VENOUS: 23 MMOL/L (ref 23–30)
TOTAL PROT. SUM,%: 100 % (ref 98–102)
TOTAL PROT. SUM: 4.3 G/DL (ref 6.3–8.2)
TOTAL PROTEIN, URINE: 127 MG/DL
TOTAL PROTEIN: 4.3 G/DL (ref 6.4–8.3)
TOTAL PROTEIN: 7.3 G/DL (ref 6.4–8.3)
TOTAL PROTEIN: 8.1 G/DL (ref 6.4–8.3)
TOXIC TRICYCLIC SC,BLOOD: NEGATIVE
TRANSFUSION STATUS: NORMAL
TRICHOMONAS: ABNORMAL
TRICYCLIC ANTIDEPRESSANTS, UR: NORMAL
TRIGL SERPL-MCNC: 131 MG/DL
TROPONIN INTERP: ABNORMAL
TROPONIN T: ABNORMAL NG/ML
TROPONIN, HIGH SENSITIVITY: 49 NG/L (ref 0–22)
TSH SERPL DL<=0.05 MIU/L-ACNC: 0.01 MIU/L (ref 0.3–5)
TSH SERPL DL<=0.05 MIU/L-ACNC: <0.01 MIU/L (ref 0.3–5)
TURBIDITY: CLEAR
UNIT DIVISION: 0
UNIT NUMBER: NORMAL
URINE HGB: ABNORMAL
UROBILINOGEN, URINE: NORMAL
VIBRIO PCR: NORMAL
VLDLC SERPL CALC-MCNC: ABNORMAL MG/DL (ref 1–30)
WBC # BLD: 10.5 K/UL (ref 3.5–11.3)
WBC # BLD: 11.8 K/UL (ref 3.5–11.3)
WBC # BLD: 13.4 K/UL (ref 3.5–11.3)
WBC # BLD: 13.5 K/UL (ref 3.5–11.3)
WBC # BLD: 15 K/UL (ref 3.5–11.3)
WBC # BLD: 15.9 K/UL (ref 3.5–11.3)
WBC # BLD: 16.2 K/UL (ref 3.5–11.3)
WBC # BLD: 19.5 K/UL (ref 3.5–11.3)
WBC # BLD: 26.2 K/UL (ref 3.5–11.3)
WBC # BLD: 31.3 K/UL (ref 3.5–11.3)
WBC # BLD: 5.3 K/UL (ref 3.5–11.3)
WBC # BLD: 6.3 K/UL (ref 3.5–11.3)
WBC # BLD: ABNORMAL 10*3/UL
WBC # BLD: ABNORMAL 10*3/UL
WBC UA: ABNORMAL /HPF (ref 0–5)
YEAST: ABNORMAL
YERSINIA ENTEROCOLITICA PCR: NORMAL
ZZ NTE CLEAN UP: ORDERED TEST: NORMAL
ZZ NTE WITH NAME CLEAN UP: SPECIMEN SOURCE: NORMAL

## 2020-01-01 PROCEDURE — 36620 INSERTION CATHETER ARTERY: CPT

## 2020-01-01 PROCEDURE — 83605 ASSAY OF LACTIC ACID: CPT

## 2020-01-01 PROCEDURE — 86901 BLOOD TYPING SEROLOGIC RH(D): CPT

## 2020-01-01 PROCEDURE — 85379 FIBRIN DEGRADATION QUANT: CPT

## 2020-01-01 PROCEDURE — 2500000003 HC RX 250 WO HCPCS: Performed by: FAMILY MEDICINE

## 2020-01-01 PROCEDURE — 2500000003 HC RX 250 WO HCPCS: Performed by: INTERNAL MEDICINE

## 2020-01-01 PROCEDURE — 99232 SBSQ HOSP IP/OBS MODERATE 35: CPT | Performed by: NURSE PRACTITIONER

## 2020-01-01 PROCEDURE — 6370000000 HC RX 637 (ALT 250 FOR IP): Performed by: FAMILY MEDICINE

## 2020-01-01 PROCEDURE — 6360000002 HC RX W HCPCS: Performed by: FAMILY MEDICINE

## 2020-01-01 PROCEDURE — 80048 BASIC METABOLIC PNL TOTAL CA: CPT

## 2020-01-01 PROCEDURE — 85730 THROMBOPLASTIN TIME PARTIAL: CPT

## 2020-01-01 PROCEDURE — 2000000000 HC ICU R&B

## 2020-01-01 PROCEDURE — 2700000000 HC OXYGEN THERAPY PER DAY

## 2020-01-01 PROCEDURE — 81003 URINALYSIS AUTO W/O SCOPE: CPT

## 2020-01-01 PROCEDURE — 2580000003 HC RX 258

## 2020-01-01 PROCEDURE — 99233 SBSQ HOSP IP/OBS HIGH 50: CPT | Performed by: INTERNAL MEDICINE

## 2020-01-01 PROCEDURE — 82435 ASSAY OF BLOOD CHLORIDE: CPT

## 2020-01-01 PROCEDURE — 85027 COMPLETE CBC AUTOMATED: CPT

## 2020-01-01 PROCEDURE — 87324 CLOSTRIDIUM AG IA: CPT

## 2020-01-01 PROCEDURE — 82947 ASSAY GLUCOSE BLOOD QUANT: CPT

## 2020-01-01 PROCEDURE — 2580000003 HC RX 258: Performed by: INTERNAL MEDICINE

## 2020-01-01 PROCEDURE — 6360000002 HC RX W HCPCS: Performed by: NURSE PRACTITIONER

## 2020-01-01 PROCEDURE — 6370000000 HC RX 637 (ALT 250 FOR IP): Performed by: NURSE PRACTITIONER

## 2020-01-01 PROCEDURE — 86900 BLOOD TYPING SEROLOGIC ABO: CPT

## 2020-01-01 PROCEDURE — 82803 BLOOD GASES ANY COMBINATION: CPT

## 2020-01-01 PROCEDURE — 93005 ELECTROCARDIOGRAM TRACING: CPT | Performed by: FAMILY MEDICINE

## 2020-01-01 PROCEDURE — 87205 SMEAR GRAM STAIN: CPT

## 2020-01-01 PROCEDURE — 99232 SBSQ HOSP IP/OBS MODERATE 35: CPT | Performed by: INTERNAL MEDICINE

## 2020-01-01 PROCEDURE — 95714 VEEG EA 12-26 HR UNMNTR: CPT

## 2020-01-01 PROCEDURE — 80051 ELECTROLYTE PANEL: CPT

## 2020-01-01 PROCEDURE — 99291 CRITICAL CARE FIRST HOUR: CPT | Performed by: INTERNAL MEDICINE

## 2020-01-01 PROCEDURE — 99255 IP/OBS CONSLTJ NEW/EST HI 80: CPT | Performed by: INTERNAL MEDICINE

## 2020-01-01 PROCEDURE — 76770 US EXAM ABDO BACK WALL COMP: CPT

## 2020-01-01 PROCEDURE — 83735 ASSAY OF MAGNESIUM: CPT

## 2020-01-01 PROCEDURE — 80053 COMPREHEN METABOLIC PANEL: CPT

## 2020-01-01 PROCEDURE — 84165 PROTEIN E-PHORESIS SERUM: CPT

## 2020-01-01 PROCEDURE — 6360000002 HC RX W HCPCS: Performed by: INTERNAL MEDICINE

## 2020-01-01 PROCEDURE — 37799 UNLISTED PX VASCULAR SURGERY: CPT

## 2020-01-01 PROCEDURE — 71260 CT THORAX DX C+: CPT

## 2020-01-01 PROCEDURE — 85384 FIBRINOGEN ACTIVITY: CPT

## 2020-01-01 PROCEDURE — 87449 NOS EACH ORGANISM AG IA: CPT

## 2020-01-01 PROCEDURE — 84295 ASSAY OF SERUM SODIUM: CPT

## 2020-01-01 PROCEDURE — 99233 SBSQ HOSP IP/OBS HIGH 50: CPT | Performed by: FAMILY MEDICINE

## 2020-01-01 PROCEDURE — 31500 INSERT EMERGENCY AIRWAY: CPT | Performed by: INTERNAL MEDICINE

## 2020-01-01 PROCEDURE — 87506 IADNA-DNA/RNA PROBE TQ 6-11: CPT

## 2020-01-01 PROCEDURE — 85025 COMPLETE CBC W/AUTO DIFF WBC: CPT

## 2020-01-01 PROCEDURE — 87040 BLOOD CULTURE FOR BACTERIA: CPT

## 2020-01-01 PROCEDURE — 2580000003 HC RX 258: Performed by: NURSE PRACTITIONER

## 2020-01-01 PROCEDURE — 94003 VENT MGMT INPAT SUBQ DAY: CPT

## 2020-01-01 PROCEDURE — 94770 HC ETCO2 MONITOR DAILY: CPT

## 2020-01-01 PROCEDURE — 99285 EMERGENCY DEPT VISIT HI MDM: CPT

## 2020-01-01 PROCEDURE — 95720 EEG PHY/QHP EA INCR W/VEEG: CPT | Performed by: PSYCHIATRY & NEUROLOGY

## 2020-01-01 PROCEDURE — 99498 ADVNCD CARE PLAN ADDL 30 MIN: CPT | Performed by: NURSE PRACTITIONER

## 2020-01-01 PROCEDURE — APPNB30 APP NON BILLABLE TIME 0-30 MINS: Performed by: NURSE PRACTITIONER

## 2020-01-01 PROCEDURE — 99291 CRITICAL CARE FIRST HOUR: CPT | Performed by: FAMILY MEDICINE

## 2020-01-01 PROCEDURE — 84100 ASSAY OF PHOSPHORUS: CPT

## 2020-01-01 PROCEDURE — 82533 TOTAL CORTISOL: CPT

## 2020-01-01 PROCEDURE — 99254 IP/OBS CNSLTJ NEW/EST MOD 60: CPT | Performed by: INTERNAL MEDICINE

## 2020-01-01 PROCEDURE — 6360000004 HC RX CONTRAST MEDICATION: Performed by: GENERAL PRACTICE

## 2020-01-01 PROCEDURE — 86160 COMPLEMENT ANTIGEN: CPT

## 2020-01-01 PROCEDURE — 6360000002 HC RX W HCPCS

## 2020-01-01 PROCEDURE — P9047 ALBUMIN (HUMAN), 25%, 50ML: HCPCS | Performed by: INTERNAL MEDICINE

## 2020-01-01 PROCEDURE — 0BH18EZ INSERTION OF ENDOTRACHEAL AIRWAY INTO TRACHEA, VIA NATURAL OR ARTIFICIAL OPENING ENDOSCOPIC: ICD-10-PCS | Performed by: INTERNAL MEDICINE

## 2020-01-01 PROCEDURE — 93010 ELECTROCARDIOGRAM REPORT: CPT | Performed by: INTERNAL MEDICINE

## 2020-01-01 PROCEDURE — 6370000000 HC RX 637 (ALT 250 FOR IP): Performed by: INTERNAL MEDICINE

## 2020-01-01 PROCEDURE — 99233 SBSQ HOSP IP/OBS HIGH 50: CPT | Performed by: PSYCHIATRY & NEUROLOGY

## 2020-01-01 PROCEDURE — P9040 RBC LEUKOREDUCED IRRADIATED: HCPCS

## 2020-01-01 PROCEDURE — 94761 N-INVAS EAR/PLS OXIMETRY MLT: CPT

## 2020-01-01 PROCEDURE — 82570 ASSAY OF URINE CREATININE: CPT

## 2020-01-01 PROCEDURE — 82565 ASSAY OF CREATININE: CPT

## 2020-01-01 PROCEDURE — 99232 SBSQ HOSP IP/OBS MODERATE 35: CPT | Performed by: PSYCHIATRY & NEUROLOGY

## 2020-01-01 PROCEDURE — 93005 ELECTROCARDIOGRAM TRACING: CPT | Performed by: INTERNAL MEDICINE

## 2020-01-01 PROCEDURE — 85610 PROTHROMBIN TIME: CPT

## 2020-01-01 PROCEDURE — 99232 SBSQ HOSP IP/OBS MODERATE 35: CPT | Performed by: FAMILY MEDICINE

## 2020-01-01 PROCEDURE — 99497 ADVNCD CARE PLAN 30 MIN: CPT | Performed by: NURSE PRACTITIONER

## 2020-01-01 PROCEDURE — 2580000003 HC RX 258: Performed by: FAMILY MEDICINE

## 2020-01-01 PROCEDURE — 83883 ASSAY NEPHELOMETRY NOT SPEC: CPT

## 2020-01-01 PROCEDURE — 99231 SBSQ HOSP IP/OBS SF/LOW 25: CPT | Performed by: NURSE PRACTITIONER

## 2020-01-01 PROCEDURE — G0480 DRUG TEST DEF 1-7 CLASSES: HCPCS

## 2020-01-01 PROCEDURE — 5A1955Z RESPIRATORY VENTILATION, GREATER THAN 96 CONSECUTIVE HOURS: ICD-10-PCS | Performed by: INTERNAL MEDICINE

## 2020-01-01 PROCEDURE — 95705 EEG W/O VID 2-12 HR UNMNTR: CPT

## 2020-01-01 PROCEDURE — 2500000003 HC RX 250 WO HCPCS

## 2020-01-01 PROCEDURE — 99255 IP/OBS CONSLTJ NEW/EST HI 80: CPT | Performed by: PSYCHIATRY & NEUROLOGY

## 2020-01-01 PROCEDURE — 84443 ASSAY THYROID STIM HORMONE: CPT

## 2020-01-01 PROCEDURE — 80061 LIPID PANEL: CPT

## 2020-01-01 PROCEDURE — 84484 ASSAY OF TROPONIN QUANT: CPT

## 2020-01-01 PROCEDURE — 93005 ELECTROCARDIOGRAM TRACING: CPT | Performed by: GENERAL PRACTICE

## 2020-01-01 PROCEDURE — 82010 KETONE BODYS QUAN: CPT

## 2020-01-01 PROCEDURE — 83615 LACTATE (LD) (LDH) ENZYME: CPT

## 2020-01-01 PROCEDURE — 80307 DRUG TEST PRSMV CHEM ANLYZR: CPT

## 2020-01-01 PROCEDURE — 93005 ELECTROCARDIOGRAM TRACING: CPT | Performed by: NURSE PRACTITIONER

## 2020-01-01 PROCEDURE — 99292 CRITICAL CARE ADDL 30 MIN: CPT | Performed by: INTERNAL MEDICINE

## 2020-01-01 PROCEDURE — 84156 ASSAY OF PROTEIN URINE: CPT

## 2020-01-01 PROCEDURE — APPSS30 APP SPLIT SHARED TIME 16-30 MINUTES: Performed by: NURSE PRACTITIONER

## 2020-01-01 PROCEDURE — 94002 VENT MGMT INPAT INIT DAY: CPT

## 2020-01-01 PROCEDURE — 86920 COMPATIBILITY TEST SPIN: CPT

## 2020-01-01 PROCEDURE — 85055 RETICULATED PLATELET ASSAY: CPT

## 2020-01-01 PROCEDURE — 84132 ASSAY OF SERUM POTASSIUM: CPT

## 2020-01-01 PROCEDURE — 95813 EEG EXTND MNTR 61-119 MIN: CPT

## 2020-01-01 PROCEDURE — 36415 COLL VENOUS BLD VENIPUNCTURE: CPT

## 2020-01-01 PROCEDURE — 84439 ASSAY OF FREE THYROXINE: CPT

## 2020-01-01 PROCEDURE — 2060000000 HC ICU INTERMEDIATE R&B

## 2020-01-01 PROCEDURE — 71045 X-RAY EXAM CHEST 1 VIEW: CPT

## 2020-01-01 PROCEDURE — 2500000003 HC RX 250 WO HCPCS: Performed by: STUDENT IN AN ORGANIZED HEALTH CARE EDUCATION/TRAINING PROGRAM

## 2020-01-01 PROCEDURE — 95819 EEG AWAKE AND ASLEEP: CPT

## 2020-01-01 PROCEDURE — 70496 CT ANGIOGRAPHY HEAD: CPT

## 2020-01-01 PROCEDURE — U0002 COVID-19 LAB TEST NON-CDC: HCPCS

## 2020-01-01 PROCEDURE — 86334 IMMUNOFIX E-PHORESIS SERUM: CPT

## 2020-01-01 PROCEDURE — 99231 SBSQ HOSP IP/OBS SF/LOW 25: CPT | Performed by: INTERNAL MEDICINE

## 2020-01-01 PROCEDURE — 99222 1ST HOSP IP/OBS MODERATE 55: CPT | Performed by: INTERNAL MEDICINE

## 2020-01-01 PROCEDURE — 82330 ASSAY OF CALCIUM: CPT

## 2020-01-01 PROCEDURE — 70450 CT HEAD/BRAIN W/O DYE: CPT

## 2020-01-01 PROCEDURE — 99254 IP/OBS CNSLTJ NEW/EST MOD 60: CPT | Performed by: PSYCHIATRY & NEUROLOGY

## 2020-01-01 PROCEDURE — 85014 HEMATOCRIT: CPT

## 2020-01-01 PROCEDURE — 95816 EEG AWAKE AND DROWSY: CPT | Performed by: PSYCHIATRY & NEUROLOGY

## 2020-01-01 PROCEDURE — 99254 IP/OBS CNSLTJ NEW/EST MOD 60: CPT | Performed by: NURSE PRACTITIONER

## 2020-01-01 PROCEDURE — 83036 HEMOGLOBIN GLYCOSYLATED A1C: CPT

## 2020-01-01 PROCEDURE — 95711 VEEG 2-12 HR UNMONITORED: CPT

## 2020-01-01 PROCEDURE — 36556 INSERT NON-TUNNEL CV CATH: CPT | Performed by: INTERNAL MEDICINE

## 2020-01-01 PROCEDURE — 2580000003 HC RX 258: Performed by: GENERAL PRACTICE

## 2020-01-01 PROCEDURE — 84145 PROCALCITONIN (PCT): CPT

## 2020-01-01 PROCEDURE — 86038 ANTINUCLEAR ANTIBODIES: CPT

## 2020-01-01 PROCEDURE — 70551 MRI BRAIN STEM W/O DYE: CPT

## 2020-01-01 PROCEDURE — 36600 WITHDRAWAL OF ARTERIAL BLOOD: CPT

## 2020-01-01 PROCEDURE — 87086 URINE CULTURE/COLONY COUNT: CPT

## 2020-01-01 PROCEDURE — 84155 ASSAY OF PROTEIN SERUM: CPT

## 2020-01-01 PROCEDURE — 36430 TRANSFUSION BLD/BLD COMPNT: CPT

## 2020-01-01 PROCEDURE — 82728 ASSAY OF FERRITIN: CPT

## 2020-01-01 PROCEDURE — 81001 URINALYSIS AUTO W/SCOPE: CPT

## 2020-01-01 PROCEDURE — 36620 INSERTION CATHETER ARTERY: CPT | Performed by: NURSE ANESTHETIST, CERTIFIED REGISTERED

## 2020-01-01 PROCEDURE — 36556 INSERT NON-TUNNEL CV CATH: CPT

## 2020-01-01 PROCEDURE — 86850 RBC ANTIBODY SCREEN: CPT

## 2020-01-01 RX ORDER — METOPROLOL TARTRATE 5 MG/5ML
5 INJECTION INTRAVENOUS EVERY 8 HOURS
Status: DISCONTINUED | OUTPATIENT
Start: 2020-01-01 | End: 2020-01-01

## 2020-01-01 RX ORDER — HEPARIN SODIUM 1000 [USP'U]/ML
25 INJECTION, SOLUTION INTRAVENOUS; SUBCUTANEOUS PRN
Status: DISCONTINUED | OUTPATIENT
Start: 2020-01-01 | End: 2020-01-01

## 2020-01-01 RX ORDER — DEXTROSE MONOHYDRATE 25 G/50ML
12.5 INJECTION, SOLUTION INTRAVENOUS PRN
Status: DISCONTINUED | OUTPATIENT
Start: 2020-01-01 | End: 2020-01-01 | Stop reason: HOSPADM

## 2020-01-01 RX ORDER — FUROSEMIDE 10 MG/ML
80 INJECTION INTRAMUSCULAR; INTRAVENOUS ONCE
Status: COMPLETED | OUTPATIENT
Start: 2020-01-01 | End: 2020-01-01

## 2020-01-01 RX ORDER — INSULIN GLARGINE 100 [IU]/ML
10 INJECTION, SOLUTION SUBCUTANEOUS NIGHTLY
Status: DISCONTINUED | OUTPATIENT
Start: 2020-01-01 | End: 2020-01-01 | Stop reason: HOSPADM

## 2020-01-01 RX ORDER — NOREPINEPHRINE BIT/0.9 % NACL 16MG/250ML
INFUSION BOTTLE (ML) INTRAVENOUS
Status: DISPENSED
Start: 2020-01-01 | End: 2020-01-01

## 2020-01-01 RX ORDER — DIPHENHYDRAMINE HYDROCHLORIDE 50 MG/ML
25 INJECTION INTRAMUSCULAR; INTRAVENOUS EVERY 6 HOURS PRN
Status: DISCONTINUED | OUTPATIENT
Start: 2020-01-01 | End: 2020-01-01 | Stop reason: HOSPADM

## 2020-01-01 RX ORDER — SODIUM CHLORIDE 0.9 % (FLUSH) 0.9 %
10 SYRINGE (ML) INJECTION EVERY 12 HOURS SCHEDULED
Status: DISCONTINUED | OUTPATIENT
Start: 2020-01-01 | End: 2020-01-01 | Stop reason: HOSPADM

## 2020-01-01 RX ORDER — SODIUM CHLORIDE 9 MG/ML
INJECTION, SOLUTION INTRAVENOUS CONTINUOUS
Status: DISCONTINUED | OUTPATIENT
Start: 2020-01-01 | End: 2020-01-01

## 2020-01-01 RX ORDER — POTASSIUM CHLORIDE 29.8 MG/ML
20 INJECTION INTRAVENOUS PRN
Status: DISCONTINUED | OUTPATIENT
Start: 2020-01-01 | End: 2020-01-01 | Stop reason: HOSPADM

## 2020-01-01 RX ORDER — POLYETHYLENE GLYCOL 3350 17 G/17G
17 POWDER, FOR SOLUTION ORAL DAILY PRN
Status: DISCONTINUED | OUTPATIENT
Start: 2020-01-01 | End: 2020-01-01 | Stop reason: HOSPADM

## 2020-01-01 RX ORDER — ACETAMINOPHEN 650 MG/1
650 SUPPOSITORY RECTAL EVERY 6 HOURS PRN
Status: DISCONTINUED | OUTPATIENT
Start: 2020-01-01 | End: 2020-01-01 | Stop reason: SDUPTHER

## 2020-01-01 RX ORDER — LORAZEPAM 2 MG/ML
1 INJECTION INTRAMUSCULAR EVERY 30 MIN PRN
Status: DISCONTINUED | OUTPATIENT
Start: 2020-01-01 | End: 2020-01-01 | Stop reason: HOSPADM

## 2020-01-01 RX ORDER — FENTANYL CITRATE 50 UG/ML
50 INJECTION, SOLUTION INTRAMUSCULAR; INTRAVENOUS EVERY 30 MIN PRN
Status: DISCONTINUED | OUTPATIENT
Start: 2020-01-01 | End: 2020-01-01 | Stop reason: HOSPADM

## 2020-01-01 RX ORDER — SODIUM CHLORIDE 9 MG/ML
5 INJECTION INTRAVENOUS DAILY
Status: DISCONTINUED | OUTPATIENT
Start: 2020-01-01 | End: 2020-01-01 | Stop reason: HOSPADM

## 2020-01-01 RX ORDER — HEPARIN SODIUM 1000 [USP'U]/ML
50 INJECTION, SOLUTION INTRAVENOUS; SUBCUTANEOUS PRN
Status: DISCONTINUED | OUTPATIENT
Start: 2020-01-01 | End: 2020-01-01

## 2020-01-01 RX ORDER — PROMETHAZINE HYDROCHLORIDE 25 MG/1
12.5 TABLET ORAL EVERY 6 HOURS PRN
Status: DISCONTINUED | OUTPATIENT
Start: 2020-01-01 | End: 2020-01-01 | Stop reason: HOSPADM

## 2020-01-01 RX ORDER — FENTANYL CITRATE 50 UG/ML
100 INJECTION, SOLUTION INTRAMUSCULAR; INTRAVENOUS EVERY 30 MIN PRN
Status: DISCONTINUED | OUTPATIENT
Start: 2020-01-01 | End: 2020-01-01 | Stop reason: HOSPADM

## 2020-01-01 RX ORDER — LORAZEPAM 2 MG/ML
2 INJECTION INTRAMUSCULAR EVERY 4 HOURS PRN
Status: DISCONTINUED | OUTPATIENT
Start: 2020-01-01 | End: 2020-01-01 | Stop reason: HOSPADM

## 2020-01-01 RX ORDER — DEXTROSE MONOHYDRATE 50 MG/ML
100 INJECTION, SOLUTION INTRAVENOUS PRN
Status: DISCONTINUED | OUTPATIENT
Start: 2020-01-01 | End: 2020-01-01 | Stop reason: HOSPADM

## 2020-01-01 RX ORDER — LORAZEPAM 2 MG/ML
0.5 INJECTION INTRAMUSCULAR EVERY 30 MIN PRN
Status: DISCONTINUED | OUTPATIENT
Start: 2020-01-01 | End: 2020-01-01 | Stop reason: HOSPADM

## 2020-01-01 RX ORDER — 0.9 % SODIUM CHLORIDE 0.9 %
500 INTRAVENOUS SOLUTION INTRAVENOUS ONCE
Status: COMPLETED | OUTPATIENT
Start: 2020-01-01 | End: 2020-01-01

## 2020-01-01 RX ORDER — NICOTINE POLACRILEX 4 MG
15 LOZENGE BUCCAL PRN
Status: DISCONTINUED | OUTPATIENT
Start: 2020-01-01 | End: 2020-01-01 | Stop reason: HOSPADM

## 2020-01-01 RX ORDER — 0.9 % SODIUM CHLORIDE 0.9 %
250 INTRAVENOUS SOLUTION INTRAVENOUS ONCE
Status: COMPLETED | OUTPATIENT
Start: 2020-01-01 | End: 2020-01-01

## 2020-01-01 RX ORDER — ONDANSETRON 2 MG/ML
4 INJECTION INTRAMUSCULAR; INTRAVENOUS EVERY 6 HOURS PRN
Status: DISCONTINUED | OUTPATIENT
Start: 2020-01-01 | End: 2020-01-01 | Stop reason: HOSPADM

## 2020-01-01 RX ORDER — NICOTINE 21 MG/24HR
1 PATCH, TRANSDERMAL 24 HOURS TRANSDERMAL DAILY PRN
Status: DISCONTINUED | OUTPATIENT
Start: 2020-01-01 | End: 2020-01-01 | Stop reason: HOSPADM

## 2020-01-01 RX ORDER — HYDROCORTISONE 10 MG/1
10 TABLET ORAL DAILY
Status: DISCONTINUED | OUTPATIENT
Start: 2020-01-01 | End: 2020-01-01

## 2020-01-01 RX ORDER — SODIUM CHLORIDE 450 MG/100ML
INJECTION, SOLUTION INTRAVENOUS CONTINUOUS
Status: DISCONTINUED | OUTPATIENT
Start: 2020-01-01 | End: 2020-01-01

## 2020-01-01 RX ORDER — FAMOTIDINE 20 MG/1
20 TABLET, FILM COATED ORAL 2 TIMES DAILY
Status: DISCONTINUED | OUTPATIENT
Start: 2020-01-01 | End: 2020-01-01

## 2020-01-01 RX ORDER — METOPROLOL TARTRATE 5 MG/5ML
5 INJECTION INTRAVENOUS EVERY 4 HOURS PRN
Status: DISCONTINUED | OUTPATIENT
Start: 2020-01-01 | End: 2020-01-01 | Stop reason: HOSPADM

## 2020-01-01 RX ORDER — LEVETIRACETAM 10 MG/ML
1000 INJECTION INTRAVASCULAR EVERY 12 HOURS
Status: DISCONTINUED | OUTPATIENT
Start: 2020-01-01 | End: 2020-01-01

## 2020-01-01 RX ORDER — 0.9 % SODIUM CHLORIDE 0.9 %
1000 INTRAVENOUS SOLUTION INTRAVENOUS ONCE
Status: COMPLETED | OUTPATIENT
Start: 2020-01-01 | End: 2020-01-01

## 2020-01-01 RX ORDER — SODIUM CHLORIDE 0.9 % (FLUSH) 0.9 %
10 SYRINGE (ML) INJECTION PRN
Status: DISCONTINUED | OUTPATIENT
Start: 2020-01-01 | End: 2020-01-01 | Stop reason: HOSPADM

## 2020-01-01 RX ORDER — POTASSIUM CHLORIDE 29.8 MG/ML
60 INJECTION INTRAVENOUS ONCE
Status: DISCONTINUED | OUTPATIENT
Start: 2020-01-01 | End: 2020-01-01 | Stop reason: CLARIF

## 2020-01-01 RX ORDER — ATORVASTATIN CALCIUM 40 MG/1
40 TABLET, FILM COATED ORAL NIGHTLY
Status: DISCONTINUED | OUTPATIENT
Start: 2020-01-01 | End: 2020-01-01 | Stop reason: HOSPADM

## 2020-01-01 RX ORDER — LEVETIRACETAM 10 MG/ML
1000 INJECTION INTRAVASCULAR ONCE
Status: COMPLETED | OUTPATIENT
Start: 2020-01-01 | End: 2020-01-01

## 2020-01-01 RX ORDER — NOREPINEPHRINE BIT/0.9 % NACL 16MG/250ML
INFUSION BOTTLE (ML) INTRAVENOUS
Status: COMPLETED
Start: 2020-01-01 | End: 2020-01-01

## 2020-01-01 RX ORDER — MAGNESIUM SULFATE HEPTAHYDRATE 40 MG/ML
2 INJECTION, SOLUTION INTRAVENOUS ONCE
Status: COMPLETED | OUTPATIENT
Start: 2020-01-01 | End: 2020-01-01

## 2020-01-01 RX ORDER — ACETAMINOPHEN 650 MG/1
650 SUPPOSITORY RECTAL EVERY 6 HOURS PRN
Status: DISCONTINUED | OUTPATIENT
Start: 2020-01-01 | End: 2020-01-01 | Stop reason: HOSPADM

## 2020-01-01 RX ORDER — HEPARIN SODIUM 10000 [USP'U]/100ML
12 INJECTION, SOLUTION INTRAVENOUS CONTINUOUS
Status: DISCONTINUED | OUTPATIENT
Start: 2020-01-01 | End: 2020-01-01

## 2020-01-01 RX ORDER — HYDROCORTISONE 5 MG/1
5 TABLET ORAL
Status: DISCONTINUED | OUTPATIENT
Start: 2020-01-01 | End: 2020-01-01

## 2020-01-01 RX ORDER — ALBUMIN (HUMAN) 12.5 G/50ML
50 SOLUTION INTRAVENOUS 2 TIMES DAILY
Status: COMPLETED | OUTPATIENT
Start: 2020-01-01 | End: 2020-01-01

## 2020-01-01 RX ORDER — FENTANYL CITRATE 50 UG/ML
25 INJECTION, SOLUTION INTRAMUSCULAR; INTRAVENOUS EVERY 30 MIN PRN
Status: DISCONTINUED | OUTPATIENT
Start: 2020-01-01 | End: 2020-01-01 | Stop reason: HOSPADM

## 2020-01-01 RX ORDER — LEVETIRACETAM 5 MG/ML
500 INJECTION INTRAVASCULAR EVERY 12 HOURS
Status: DISCONTINUED | OUTPATIENT
Start: 2020-01-01 | End: 2020-01-01

## 2020-01-01 RX ORDER — FAMOTIDINE 20 MG/1
20 TABLET, FILM COATED ORAL DAILY
Status: DISCONTINUED | OUTPATIENT
Start: 2020-01-01 | End: 2020-01-01

## 2020-01-01 RX ORDER — LEVOTHYROXINE SODIUM ANHYDROUS 100 UG/5ML
50 INJECTION, POWDER, LYOPHILIZED, FOR SOLUTION INTRAVENOUS DAILY
Status: DISCONTINUED | OUTPATIENT
Start: 2020-01-01 | End: 2020-01-01 | Stop reason: HOSPADM

## 2020-01-01 RX ORDER — ASPIRIN 81 MG/1
81 TABLET, CHEWABLE ORAL DAILY
Status: DISCONTINUED | OUTPATIENT
Start: 2020-01-01 | End: 2020-01-01 | Stop reason: HOSPADM

## 2020-01-01 RX ORDER — ASPIRIN 81 MG/1
81 TABLET ORAL DAILY
Status: DISCONTINUED | OUTPATIENT
Start: 2020-01-01 | End: 2020-01-01

## 2020-01-01 RX ORDER — POTASSIUM CHLORIDE 29.8 MG/ML
20 INJECTION INTRAVENOUS
Status: COMPLETED | OUTPATIENT
Start: 2020-01-01 | End: 2020-01-01

## 2020-01-01 RX ORDER — METOPROLOL TARTRATE 5 MG/5ML
2.5 INJECTION INTRAVENOUS EVERY 6 HOURS PRN
Status: DISCONTINUED | OUTPATIENT
Start: 2020-01-01 | End: 2020-01-01 | Stop reason: HOSPADM

## 2020-01-01 RX ORDER — ACETAMINOPHEN 325 MG/1
650 TABLET ORAL EVERY 6 HOURS PRN
Status: DISCONTINUED | OUTPATIENT
Start: 2020-01-01 | End: 2020-01-01 | Stop reason: HOSPADM

## 2020-01-01 RX ORDER — LEVETIRACETAM 5 MG/ML
500 INJECTION INTRAVASCULAR ONCE
Status: COMPLETED | OUTPATIENT
Start: 2020-01-01 | End: 2020-01-01

## 2020-01-01 RX ORDER — NOREPINEPHRINE BIT/0.9 % NACL 16MG/250ML
2 INFUSION BOTTLE (ML) INTRAVENOUS CONTINUOUS
Status: DISCONTINUED | OUTPATIENT
Start: 2020-01-01 | End: 2020-01-01

## 2020-01-01 RX ORDER — GLYCOPYRROLATE 0.2 MG/ML
0.2 INJECTION INTRAMUSCULAR; INTRAVENOUS EVERY 4 HOURS PRN
Status: DISCONTINUED | OUTPATIENT
Start: 2020-01-01 | End: 2020-01-01 | Stop reason: HOSPADM

## 2020-01-01 RX ORDER — NOREPINEPHRINE BIT/0.9 % NACL 16MG/250ML
2 INFUSION BOTTLE (ML) INTRAVENOUS CONTINUOUS
Status: DISCONTINUED | OUTPATIENT
Start: 2020-01-01 | End: 2020-01-01 | Stop reason: HOSPADM

## 2020-01-01 RX ORDER — 0.9 % SODIUM CHLORIDE 0.9 %
30 INTRAVENOUS SOLUTION INTRAVENOUS ONCE
Status: COMPLETED | OUTPATIENT
Start: 2020-01-01 | End: 2020-01-01

## 2020-01-01 RX ORDER — FAMOTIDINE 20 MG/1
20 TABLET, FILM COATED ORAL DAILY
Status: DISCONTINUED | OUTPATIENT
Start: 2020-01-01 | End: 2020-01-01 | Stop reason: HOSPADM

## 2020-01-01 RX ORDER — MINERAL OIL AND WHITE PETROLATUM 150; 830 MG/G; MG/G
OINTMENT OPHTHALMIC PRN
Status: DISCONTINUED | OUTPATIENT
Start: 2020-01-01 | End: 2020-01-01 | Stop reason: HOSPADM

## 2020-01-01 RX ORDER — ACETAMINOPHEN 325 MG/1
650 TABLET ORAL EVERY 6 HOURS PRN
Status: DISCONTINUED | OUTPATIENT
Start: 2020-01-01 | End: 2020-01-01 | Stop reason: SDUPTHER

## 2020-01-01 RX ORDER — METOPROLOL TARTRATE 5 MG/5ML
2.5 INJECTION INTRAVENOUS EVERY 6 HOURS PRN
Status: DISCONTINUED | OUTPATIENT
Start: 2020-01-01 | End: 2020-01-01

## 2020-01-01 RX ORDER — FUROSEMIDE 10 MG/ML
INJECTION INTRAMUSCULAR; INTRAVENOUS
Status: COMPLETED
Start: 2020-01-01 | End: 2020-01-01

## 2020-01-01 RX ORDER — FUROSEMIDE 10 MG/ML
40 INJECTION INTRAMUSCULAR; INTRAVENOUS 2 TIMES DAILY
Status: COMPLETED | OUTPATIENT
Start: 2020-01-01 | End: 2020-01-01

## 2020-01-01 RX ADMIN — DEXTROSE MONOHYDRATE 12.5 G: 25 INJECTION, SOLUTION INTRAVENOUS at 21:23

## 2020-01-01 RX ADMIN — FUROSEMIDE 80 MG: 10 INJECTION INTRAMUSCULAR; INTRAVENOUS at 13:59

## 2020-01-01 RX ADMIN — FUROSEMIDE 40 MG/HR: 10 INJECTION, SOLUTION INTRAMUSCULAR; INTRAVENOUS at 20:16

## 2020-01-01 RX ADMIN — AMIODARONE HYDROCHLORIDE 0.5 MG/MIN: 50 INJECTION, SOLUTION INTRAVENOUS at 00:07

## 2020-01-01 RX ADMIN — LEVETIRACETAM 1000 MG: 10 INJECTION INTRAVENOUS at 09:09

## 2020-01-01 RX ADMIN — FUROSEMIDE 40 MG: 10 INJECTION, SOLUTION INTRAMUSCULAR; INTRAVENOUS at 08:33

## 2020-01-01 RX ADMIN — Medication 5 ML: at 07:54

## 2020-01-01 RX ADMIN — LEVETIRACETAM 1000 MG: 10 INJECTION INTRAVENOUS at 08:30

## 2020-01-01 RX ADMIN — FUROSEMIDE 40 MG/HR: 10 INJECTION, SOLUTION INTRAMUSCULAR; INTRAVENOUS at 17:00

## 2020-01-01 RX ADMIN — LEVETIRACETAM 500 MG: 500 INJECTION, SOLUTION INTRAVENOUS at 02:40

## 2020-01-01 RX ADMIN — LEVOTHYROXINE SODIUM ANHYDROUS 50 MCG: 100 INJECTION, POWDER, LYOPHILIZED, FOR SOLUTION INTRAVENOUS at 09:03

## 2020-01-01 RX ADMIN — DIPHENHYDRAMINE HYDROCHLORIDE 25 MG: 50 INJECTION, SOLUTION INTRAMUSCULAR; INTRAVENOUS at 23:21

## 2020-01-01 RX ADMIN — SODIUM CHLORIDE, PRESERVATIVE FREE 10 ML: 5 INJECTION INTRAVENOUS at 07:54

## 2020-01-01 RX ADMIN — HYDROCORTISONE SODIUM SUCCINATE 50 MG: 100 INJECTION, POWDER, FOR SOLUTION INTRAMUSCULAR; INTRAVENOUS at 14:53

## 2020-01-01 RX ADMIN — Medication 50 MEQ: at 20:27

## 2020-01-01 RX ADMIN — HYDROCORTISONE SODIUM SUCCINATE 25 MG: 100 INJECTION, POWDER, FOR SOLUTION INTRAMUSCULAR; INTRAVENOUS at 07:54

## 2020-01-01 RX ADMIN — HYDROCORTISONE SODIUM SUCCINATE 50 MG: 100 INJECTION, POWDER, FOR SOLUTION INTRAMUSCULAR; INTRAVENOUS at 14:00

## 2020-01-01 RX ADMIN — INSULIN GLARGINE 10 UNITS: 100 INJECTION, SOLUTION SUBCUTANEOUS at 21:31

## 2020-01-01 RX ADMIN — ENOXAPARIN SODIUM 30 MG: 30 INJECTION SUBCUTANEOUS at 00:46

## 2020-01-01 RX ADMIN — INSULIN LISPRO 6 UNITS: 100 INJECTION, SOLUTION INTRAVENOUS; SUBCUTANEOUS at 08:06

## 2020-01-01 RX ADMIN — LEVETIRACETAM 1000 MG: 10 INJECTION INTRAVENOUS at 08:14

## 2020-01-01 RX ADMIN — DESMOPRESSIN ACETATE 40 MG: 0.2 TABLET ORAL at 20:26

## 2020-01-01 RX ADMIN — ENOXAPARIN SODIUM 30 MG: 30 INJECTION SUBCUTANEOUS at 20:23

## 2020-01-01 RX ADMIN — DIPHENHYDRAMINE HYDROCHLORIDE 25 MG: 50 INJECTION, SOLUTION INTRAMUSCULAR; INTRAVENOUS at 02:52

## 2020-01-01 RX ADMIN — SODIUM CHLORIDE, PRESERVATIVE FREE 10 ML: 5 INJECTION INTRAVENOUS at 21:31

## 2020-01-01 RX ADMIN — AMIODARONE HYDROCHLORIDE 0.5 MG/MIN: 50 INJECTION, SOLUTION INTRAVENOUS at 07:57

## 2020-01-01 RX ADMIN — LEVOTHYROXINE SODIUM ANHYDROUS 50 MCG: 100 INJECTION, POWDER, LYOPHILIZED, FOR SOLUTION INTRAVENOUS at 07:46

## 2020-01-01 RX ADMIN — FUROSEMIDE 40 MG/HR: 10 INJECTION, SOLUTION INTRAMUSCULAR; INTRAVENOUS at 12:58

## 2020-01-01 RX ADMIN — AMIODARONE HYDROCHLORIDE 0.5 MG/MIN: 50 INJECTION, SOLUTION INTRAVENOUS at 12:53

## 2020-01-01 RX ADMIN — SODIUM CHLORIDE 250 ML: 9 INJECTION, SOLUTION INTRAVENOUS at 08:35

## 2020-01-01 RX ADMIN — ENOXAPARIN SODIUM 30 MG: 30 INJECTION SUBCUTANEOUS at 09:28

## 2020-01-01 RX ADMIN — HYDROCORTISONE SODIUM SUCCINATE 25 MG: 100 INJECTION, POWDER, FOR SOLUTION INTRAMUSCULAR; INTRAVENOUS at 00:42

## 2020-01-01 RX ADMIN — HYDROCORTISONE SODIUM SUCCINATE 50 MG: 100 INJECTION, POWDER, FOR SOLUTION INTRAMUSCULAR; INTRAVENOUS at 23:51

## 2020-01-01 RX ADMIN — PIPERACILLIN AND TAZOBACTAM 3.38 G: 3; .375 INJECTION, POWDER, FOR SOLUTION INTRAVENOUS at 08:21

## 2020-01-01 RX ADMIN — Medication 5 ML: at 07:56

## 2020-01-01 RX ADMIN — DEXTROSE MONOHYDRATE 12.5 G: 25 INJECTION, SOLUTION INTRAVENOUS at 23:59

## 2020-01-01 RX ADMIN — LEVOTHYROXINE SODIUM ANHYDROUS 50 MCG: 100 INJECTION, POWDER, LYOPHILIZED, FOR SOLUTION INTRAVENOUS at 08:33

## 2020-01-01 RX ADMIN — POTASSIUM CHLORIDE 20 MEQ: 400 INJECTION, SOLUTION INTRAVENOUS at 10:27

## 2020-01-01 RX ADMIN — HYDROCORTISONE SODIUM SUCCINATE 50 MG: 100 INJECTION, POWDER, FOR SOLUTION INTRAMUSCULAR; INTRAVENOUS at 07:54

## 2020-01-01 RX ADMIN — HYDROCORTISONE SODIUM SUCCINATE 50 MG: 100 INJECTION, POWDER, FOR SOLUTION INTRAMUSCULAR; INTRAVENOUS at 08:33

## 2020-01-01 RX ADMIN — POTASSIUM CHLORIDE 20 MEQ: 400 INJECTION, SOLUTION INTRAVENOUS at 05:32

## 2020-01-01 RX ADMIN — Medication 50 MEQ: at 19:03

## 2020-01-01 RX ADMIN — FAMOTIDINE 20 MG: 20 TABLET, FILM COATED ORAL at 11:22

## 2020-01-01 RX ADMIN — SODIUM CHLORIDE: 234 INJECTION INTRAMUSCULAR; INTRAVENOUS; SUBCUTANEOUS at 05:20

## 2020-01-01 RX ADMIN — Medication 30 MCG/MIN: at 15:13

## 2020-01-01 RX ADMIN — Medication 5 MCG/MIN: at 00:54

## 2020-01-01 RX ADMIN — Medication 2 MG/HR: at 05:00

## 2020-01-01 RX ADMIN — FUROSEMIDE 10 MG/HR: 10 INJECTION, SOLUTION INTRAMUSCULAR; INTRAVENOUS at 02:38

## 2020-01-01 RX ADMIN — ENOXAPARIN SODIUM 30 MG: 30 INJECTION SUBCUTANEOUS at 10:16

## 2020-01-01 RX ADMIN — SODIUM CHLORIDE, PRESERVATIVE FREE 10 ML: 5 INJECTION INTRAVENOUS at 21:00

## 2020-01-01 RX ADMIN — ASPIRIN 81 MG: 81 TABLET ORAL at 08:55

## 2020-01-01 RX ADMIN — METOPROLOL TARTRATE 2.5 MG: 5 INJECTION, SOLUTION INTRAVENOUS at 05:55

## 2020-01-01 RX ADMIN — INSULIN LISPRO 4 UNITS: 100 INJECTION, SOLUTION INTRAVENOUS; SUBCUTANEOUS at 13:28

## 2020-01-01 RX ADMIN — SODIUM CHLORIDE, PRESERVATIVE FREE 10 ML: 5 INJECTION INTRAVENOUS at 07:46

## 2020-01-01 RX ADMIN — DESMOPRESSIN ACETATE 40 MG: 0.2 TABLET ORAL at 20:43

## 2020-01-01 RX ADMIN — PIPERACILLIN AND TAZOBACTAM 3.38 G: 3; .375 INJECTION, POWDER, FOR SOLUTION INTRAVENOUS at 16:30

## 2020-01-01 RX ADMIN — HYDROCORTISONE SODIUM SUCCINATE 50 MG: 100 INJECTION, POWDER, FOR SOLUTION INTRAMUSCULAR; INTRAVENOUS at 04:51

## 2020-01-01 RX ADMIN — SODIUM CHLORIDE, PRESERVATIVE FREE 10 ML: 5 INJECTION INTRAVENOUS at 20:42

## 2020-01-01 RX ADMIN — POTASSIUM CHLORIDE 20 MEQ: 400 INJECTION, SOLUTION INTRAVENOUS at 09:08

## 2020-01-01 RX ADMIN — INSULIN LISPRO 2 UNITS: 100 INJECTION, SOLUTION INTRAVENOUS; SUBCUTANEOUS at 06:00

## 2020-01-01 RX ADMIN — HYDROCORTISONE SODIUM SUCCINATE 25 MG: 100 INJECTION, POWDER, FOR SOLUTION INTRAMUSCULAR; INTRAVENOUS at 16:21

## 2020-01-01 RX ADMIN — SODIUM CHLORIDE 500 ML: 9 INJECTION, SOLUTION INTRAVENOUS at 00:30

## 2020-01-01 RX ADMIN — SODIUM CHLORIDE, PRESERVATIVE FREE 10 ML: 5 INJECTION INTRAVENOUS at 21:30

## 2020-01-01 RX ADMIN — ASPIRIN 81 MG: 81 TABLET ORAL at 07:54

## 2020-01-01 RX ADMIN — FUROSEMIDE 80 MG: 10 INJECTION, SOLUTION INTRAMUSCULAR; INTRAVENOUS at 10:39

## 2020-01-01 RX ADMIN — Medication: at 16:44

## 2020-01-01 RX ADMIN — ASPIRIN 81 MG: 81 TABLET ORAL at 12:22

## 2020-01-01 RX ADMIN — INSULIN LISPRO 4 UNITS: 100 INJECTION, SOLUTION INTRAVENOUS; SUBCUTANEOUS at 18:41

## 2020-01-01 RX ADMIN — SODIUM CHLORIDE, PRESERVATIVE FREE 10 ML: 5 INJECTION INTRAVENOUS at 08:17

## 2020-01-01 RX ADMIN — DESMOPRESSIN ACETATE 40 MG: 0.2 TABLET ORAL at 22:29

## 2020-01-01 RX ADMIN — HYDROCORTISONE SODIUM SUCCINATE 50 MG: 100 INJECTION, POWDER, FOR SOLUTION INTRAMUSCULAR; INTRAVENOUS at 08:13

## 2020-01-01 RX ADMIN — ENOXAPARIN SODIUM 30 MG: 30 INJECTION SUBCUTANEOUS at 02:52

## 2020-01-01 RX ADMIN — IOHEXOL 165 ML: 350 INJECTION, SOLUTION INTRAVENOUS at 21:01

## 2020-01-01 RX ADMIN — AMIODARONE HYDROCHLORIDE 1 MG/MIN: 50 INJECTION, SOLUTION INTRAVENOUS at 06:54

## 2020-01-01 RX ADMIN — DESMOPRESSIN ACETATE 40 MG: 0.2 TABLET ORAL at 21:23

## 2020-01-01 RX ADMIN — LEVETIRACETAM 1000 MG: 10 INJECTION INTRAVENOUS at 20:50

## 2020-01-01 RX ADMIN — SODIUM CHLORIDE: 234 INJECTION INTRAMUSCULAR; INTRAVENOUS; SUBCUTANEOUS at 17:00

## 2020-01-01 RX ADMIN — HYDROCORTISONE SODIUM SUCCINATE 50 MG: 100 INJECTION, POWDER, FOR SOLUTION INTRAMUSCULAR; INTRAVENOUS at 14:30

## 2020-01-01 RX ADMIN — EPINEPHRINE 11 MCG/MIN: 1 INJECTION PARENTERAL at 18:01

## 2020-01-01 RX ADMIN — LEVETIRACETAM 1000 MG: 10 INJECTION INTRAVENOUS at 21:22

## 2020-01-01 RX ADMIN — ACETAMINOPHEN 650 MG: 325 TABLET ORAL at 00:09

## 2020-01-01 RX ADMIN — FAMOTIDINE 20 MG: 20 TABLET, FILM COATED ORAL at 08:55

## 2020-01-01 RX ADMIN — MAGNESIUM SULFATE HEPTAHYDRATE 2 G: 40 INJECTION, SOLUTION INTRAVENOUS at 02:15

## 2020-01-01 RX ADMIN — PIPERACILLIN AND TAZOBACTAM 3.38 G: 3; .375 INJECTION, POWDER, FOR SOLUTION INTRAVENOUS at 00:28

## 2020-01-01 RX ADMIN — INSULIN LISPRO 4 UNITS: 100 INJECTION, SOLUTION INTRAVENOUS; SUBCUTANEOUS at 11:30

## 2020-01-01 RX ADMIN — Medication 5 ML: at 16:44

## 2020-01-01 RX ADMIN — SODIUM CHLORIDE, PRESERVATIVE FREE 10 ML: 5 INJECTION INTRAVENOUS at 08:36

## 2020-01-01 RX ADMIN — FAMOTIDINE 20 MG: 10 INJECTION, SOLUTION INTRAVENOUS at 09:09

## 2020-01-01 RX ADMIN — HYDROCORTISONE SODIUM SUCCINATE 50 MG: 100 INJECTION, POWDER, FOR SOLUTION INTRAMUSCULAR; INTRAVENOUS at 15:00

## 2020-01-01 RX ADMIN — LORAZEPAM 2 MG: 2 INJECTION INTRAMUSCULAR; INTRAVENOUS at 18:24

## 2020-01-01 RX ADMIN — ACETAMINOPHEN 650 MG: 650 SUPPOSITORY RECTAL at 23:22

## 2020-01-01 RX ADMIN — POTASSIUM CHLORIDE 20 MEQ: 400 INJECTION, SOLUTION INTRAVENOUS at 08:07

## 2020-01-01 RX ADMIN — SODIUM CHLORIDE, PRESERVATIVE FREE 10 ML: 5 INJECTION INTRAVENOUS at 08:00

## 2020-01-01 RX ADMIN — INSULIN LISPRO 2 UNITS: 100 INJECTION, SOLUTION INTRAVENOUS; SUBCUTANEOUS at 06:31

## 2020-01-01 RX ADMIN — FAMOTIDINE 20 MG: 10 INJECTION, SOLUTION INTRAVENOUS at 20:50

## 2020-01-01 RX ADMIN — LEVOTHYROXINE SODIUM ANHYDROUS 50 MCG: 100 INJECTION, POWDER, LYOPHILIZED, FOR SOLUTION INTRAVENOUS at 07:54

## 2020-01-01 RX ADMIN — LEVOTHYROXINE SODIUM ANHYDROUS 50 MCG: 100 INJECTION, POWDER, LYOPHILIZED, FOR SOLUTION INTRAVENOUS at 07:51

## 2020-01-01 RX ADMIN — SODIUM BICARBONATE 50 MEQ: 84 INJECTION, SOLUTION INTRAVENOUS at 20:27

## 2020-01-01 RX ADMIN — Medication 10 MCG/MIN: at 00:51

## 2020-01-01 RX ADMIN — ASPIRIN 81 MG: 81 TABLET, COATED ORAL at 09:10

## 2020-01-01 RX ADMIN — INSULIN LISPRO 4 UNITS: 100 INJECTION, SOLUTION INTRAVENOUS; SUBCUTANEOUS at 16:50

## 2020-01-01 RX ADMIN — LEVETIRACETAM 1000 MG: 10 INJECTION INTRAVENOUS at 08:33

## 2020-01-01 RX ADMIN — INSULIN GLARGINE 10 UNITS: 100 INJECTION, SOLUTION SUBCUTANEOUS at 20:51

## 2020-01-01 RX ADMIN — METOPROLOL TARTRATE 2.5 MG: 5 INJECTION, SOLUTION INTRAVENOUS at 07:47

## 2020-01-01 RX ADMIN — SODIUM CHLORIDE, PRESERVATIVE FREE 10 ML: 5 INJECTION INTRAVENOUS at 08:49

## 2020-01-01 RX ADMIN — FAMOTIDINE 20 MG: 20 TABLET, FILM COATED ORAL at 07:54

## 2020-01-01 RX ADMIN — ASPIRIN 81 MG: 81 TABLET, COATED ORAL at 08:16

## 2020-01-01 RX ADMIN — FAMOTIDINE 20 MG: 20 TABLET, FILM COATED ORAL at 08:16

## 2020-01-01 RX ADMIN — PIPERACILLIN AND TAZOBACTAM 3.38 G: 3; .375 INJECTION, POWDER, FOR SOLUTION INTRAVENOUS at 16:43

## 2020-01-01 RX ADMIN — METOPROLOL TARTRATE 2.5 MG: 5 INJECTION, SOLUTION INTRAVENOUS at 18:30

## 2020-01-01 RX ADMIN — HYDROCORTISONE SODIUM SUCCINATE 50 MG: 100 INJECTION, POWDER, FOR SOLUTION INTRAMUSCULAR; INTRAVENOUS at 20:26

## 2020-01-01 RX ADMIN — INSULIN LISPRO 2 UNITS: 100 INJECTION, SOLUTION INTRAVENOUS; SUBCUTANEOUS at 01:27

## 2020-01-01 RX ADMIN — INSULIN LISPRO 2 UNITS: 100 INJECTION, SOLUTION INTRAVENOUS; SUBCUTANEOUS at 03:00

## 2020-01-01 RX ADMIN — SODIUM CHLORIDE: 234 INJECTION INTRAMUSCULAR; INTRAVENOUS; SUBCUTANEOUS at 18:20

## 2020-01-01 RX ADMIN — ALBUMIN (HUMAN) 50 G: 0.25 INJECTION, SOLUTION INTRAVENOUS at 11:40

## 2020-01-01 RX ADMIN — HYDROCORTISONE SODIUM SUCCINATE 25 MG: 100 INJECTION, POWDER, FOR SOLUTION INTRAMUSCULAR; INTRAVENOUS at 23:27

## 2020-01-01 RX ADMIN — DESMOPRESSIN ACETATE 40 MG: 0.2 TABLET ORAL at 21:22

## 2020-01-01 RX ADMIN — FAMOTIDINE 20 MG: 10 INJECTION, SOLUTION INTRAVENOUS at 08:33

## 2020-01-01 RX ADMIN — HYDROCORTISONE SODIUM SUCCINATE 50 MG: 100 INJECTION, POWDER, FOR SOLUTION INTRAMUSCULAR; INTRAVENOUS at 03:00

## 2020-01-01 RX ADMIN — HYDROCORTISONE SODIUM SUCCINATE 50 MG: 100 INJECTION, POWDER, FOR SOLUTION INTRAMUSCULAR; INTRAVENOUS at 09:06

## 2020-01-01 RX ADMIN — ENOXAPARIN SODIUM 30 MG: 30 INJECTION SUBCUTANEOUS at 08:13

## 2020-01-01 RX ADMIN — FUROSEMIDE 80 MG: 10 INJECTION, SOLUTION INTRAMUSCULAR; INTRAVENOUS at 13:59

## 2020-01-01 RX ADMIN — SODIUM CHLORIDE 1000 ML: 9 INJECTION, SOLUTION INTRAVENOUS at 22:58

## 2020-01-01 RX ADMIN — HYDROCORTISONE SODIUM SUCCINATE 50 MG: 100 INJECTION, POWDER, FOR SOLUTION INTRAMUSCULAR; INTRAVENOUS at 00:49

## 2020-01-01 RX ADMIN — HYDROCORTISONE SODIUM SUCCINATE 50 MG: 100 INJECTION, POWDER, FOR SOLUTION INTRAMUSCULAR; INTRAVENOUS at 20:31

## 2020-01-01 RX ADMIN — HYDROCORTISONE SODIUM SUCCINATE 50 MG: 100 INJECTION, POWDER, FOR SOLUTION INTRAMUSCULAR; INTRAVENOUS at 12:22

## 2020-01-01 RX ADMIN — Medication 5 ML: at 08:16

## 2020-01-01 RX ADMIN — HYDROCORTISONE SODIUM SUCCINATE 25 MG: 100 INJECTION, POWDER, FOR SOLUTION INTRAMUSCULAR; INTRAVENOUS at 16:32

## 2020-01-01 RX ADMIN — SODIUM CHLORIDE, PRESERVATIVE FREE 10 ML: 5 INJECTION INTRAVENOUS at 09:41

## 2020-01-01 RX ADMIN — LEVETIRACETAM 500 MG: 5 INJECTION INTRAVENOUS at 20:24

## 2020-01-01 RX ADMIN — HYDROCORTISONE SODIUM SUCCINATE 25 MG: 100 INJECTION, POWDER, FOR SOLUTION INTRAMUSCULAR; INTRAVENOUS at 08:37

## 2020-01-01 RX ADMIN — INSULIN LISPRO 2 UNITS: 100 INJECTION, SOLUTION INTRAVENOUS; SUBCUTANEOUS at 21:00

## 2020-01-01 RX ADMIN — INSULIN GLARGINE 10 UNITS: 100 INJECTION, SOLUTION SUBCUTANEOUS at 20:42

## 2020-01-01 RX ADMIN — LEVETIRACETAM 1000 MG: 10 INJECTION INTRAVENOUS at 10:21

## 2020-01-01 RX ADMIN — INSULIN GLARGINE 10 UNITS: 100 INJECTION, SOLUTION SUBCUTANEOUS at 11:34

## 2020-01-01 RX ADMIN — ASPIRIN 81 MG: 81 TABLET ORAL at 09:00

## 2020-01-01 RX ADMIN — SODIUM CHLORIDE, PRESERVATIVE FREE 10 ML: 5 INJECTION INTRAVENOUS at 08:48

## 2020-01-01 RX ADMIN — FAMOTIDINE 20 MG: 10 INJECTION, SOLUTION INTRAVENOUS at 07:46

## 2020-01-01 RX ADMIN — HYDROCORTISONE SODIUM SUCCINATE 50 MG: 100 INJECTION, POWDER, FOR SOLUTION INTRAMUSCULAR; INTRAVENOUS at 00:28

## 2020-01-01 RX ADMIN — LEVOTHYROXINE SODIUM ANHYDROUS 50 MCG: 100 INJECTION, POWDER, LYOPHILIZED, FOR SOLUTION INTRAVENOUS at 16:44

## 2020-01-01 RX ADMIN — ACETAMINOPHEN 650 MG: 325 TABLET ORAL at 17:15

## 2020-01-01 RX ADMIN — LEVETIRACETAM 500 MG: 5 INJECTION INTRAVENOUS at 09:28

## 2020-01-01 RX ADMIN — LORAZEPAM 1 MG: 2 INJECTION INTRAMUSCULAR at 04:28

## 2020-01-01 RX ADMIN — ASPIRIN 81 MG: 81 TABLET ORAL at 11:22

## 2020-01-01 RX ADMIN — FUROSEMIDE 40 MG/HR: 10 INJECTION, SOLUTION INTRAMUSCULAR; INTRAVENOUS at 05:08

## 2020-01-01 RX ADMIN — FUROSEMIDE 40 MG/HR: 10 INJECTION, SOLUTION INTRAMUSCULAR; INTRAVENOUS at 01:25

## 2020-01-01 RX ADMIN — DESMOPRESSIN ACETATE 40 MG: 0.2 TABLET ORAL at 20:31

## 2020-01-01 RX ADMIN — AMIODARONE HYDROCHLORIDE 150 MG: 50 INJECTION, SOLUTION INTRAVENOUS at 06:41

## 2020-01-01 RX ADMIN — Medication 5 ML: at 07:46

## 2020-01-01 RX ADMIN — LEVETIRACETAM 1000 MG: 10 INJECTION INTRAVENOUS at 20:26

## 2020-01-01 RX ADMIN — LEVOTHYROXINE SODIUM ANHYDROUS 50 MCG: 100 INJECTION, POWDER, LYOPHILIZED, FOR SOLUTION INTRAVENOUS at 08:16

## 2020-01-01 RX ADMIN — HYDROCORTISONE SODIUM SUCCINATE 50 MG: 100 INJECTION, POWDER, FOR SOLUTION INTRAMUSCULAR; INTRAVENOUS at 07:46

## 2020-01-01 RX ADMIN — LEVOTHYROXINE SODIUM ANHYDROUS 50 MCG: 100 INJECTION, POWDER, LYOPHILIZED, FOR SOLUTION INTRAVENOUS at 10:00

## 2020-01-01 RX ADMIN — HYDROCORTISONE SODIUM SUCCINATE 50 MG: 100 INJECTION, POWDER, FOR SOLUTION INTRAMUSCULAR; INTRAVENOUS at 00:48

## 2020-01-01 RX ADMIN — FUROSEMIDE 80 MG: 10 INJECTION, SOLUTION INTRAMUSCULAR; INTRAVENOUS at 07:52

## 2020-01-01 RX ADMIN — Medication 5 ML: at 08:33

## 2020-01-01 RX ADMIN — HYDROCORTISONE SODIUM SUCCINATE 25 MG: 100 INJECTION, POWDER, FOR SOLUTION INTRAMUSCULAR; INTRAVENOUS at 08:00

## 2020-01-01 RX ADMIN — Medication: at 05:00

## 2020-01-01 RX ADMIN — Medication: at 07:54

## 2020-01-01 RX ADMIN — PIPERACILLIN AND TAZOBACTAM 3.38 G: 3; .375 INJECTION, POWDER, FOR SOLUTION INTRAVENOUS at 08:14

## 2020-01-01 RX ADMIN — SODIUM BICARBONATE 50 MEQ: 84 INJECTION, SOLUTION INTRAVENOUS at 20:28

## 2020-01-01 RX ADMIN — DESMOPRESSIN ACETATE 40 MG: 0.2 TABLET ORAL at 20:51

## 2020-01-01 RX ADMIN — SODIUM CHLORIDE 1000 ML: 9 INJECTION, SOLUTION INTRAVENOUS at 18:30

## 2020-01-01 RX ADMIN — LEVOTHYROXINE SODIUM ANHYDROUS 50 MCG: 100 INJECTION, POWDER, LYOPHILIZED, FOR SOLUTION INTRAVENOUS at 08:56

## 2020-01-01 RX ADMIN — Medication 5 ML: at 08:56

## 2020-01-01 RX ADMIN — Medication 5 ML: at 11:22

## 2020-01-01 RX ADMIN — INSULIN LISPRO 2 UNITS: 100 INJECTION, SOLUTION INTRAVENOUS; SUBCUTANEOUS at 13:12

## 2020-01-01 RX ADMIN — POTASSIUM CHLORIDE 20 MEQ: 400 INJECTION, SOLUTION INTRAVENOUS at 11:28

## 2020-01-01 RX ADMIN — FAMOTIDINE 20 MG: 10 INJECTION, SOLUTION INTRAVENOUS at 08:00

## 2020-01-01 RX ADMIN — Medication 5 ML: at 09:00

## 2020-01-01 RX ADMIN — HYDROCORTISONE SODIUM SUCCINATE 50 MG: 100 INJECTION, POWDER, FOR SOLUTION INTRAMUSCULAR; INTRAVENOUS at 22:29

## 2020-01-01 RX ADMIN — DESMOPRESSIN ACETATE 40 MG: 0.2 TABLET ORAL at 21:00

## 2020-01-01 RX ADMIN — INSULIN LISPRO 2 UNITS: 100 INJECTION, SOLUTION INTRAVENOUS; SUBCUTANEOUS at 20:42

## 2020-01-01 RX ADMIN — METOPROLOL TARTRATE 2.5 MG: 5 INJECTION, SOLUTION INTRAVENOUS at 11:58

## 2020-01-01 RX ADMIN — Medication 5 ML: at 09:41

## 2020-01-01 RX ADMIN — FENTANYL CITRATE 100 MCG: 50 INJECTION, SOLUTION INTRAMUSCULAR; INTRAVENOUS at 18:24

## 2020-01-01 RX ADMIN — DIPHENHYDRAMINE HYDROCHLORIDE 25 MG: 50 INJECTION, SOLUTION INTRAMUSCULAR; INTRAVENOUS at 09:28

## 2020-01-01 RX ADMIN — DESMOPRESSIN ACETATE 40 MG: 0.2 TABLET ORAL at 20:49

## 2020-01-01 RX ADMIN — FUROSEMIDE 40 MG/HR: 10 INJECTION, SOLUTION INTRAMUSCULAR; INTRAVENOUS at 21:23

## 2020-01-01 RX ADMIN — HYDROCORTISONE SODIUM SUCCINATE 25 MG: 100 INJECTION, POWDER, FOR SOLUTION INTRAMUSCULAR; INTRAVENOUS at 00:35

## 2020-01-01 RX ADMIN — ALBUMIN (HUMAN) 50 G: 0.25 INJECTION, SOLUTION INTRAVENOUS at 00:31

## 2020-01-01 RX ADMIN — SODIUM CHLORIDE 2313 ML: 9 INJECTION, SOLUTION INTRAVENOUS at 17:56

## 2020-01-01 RX ADMIN — HYDROCORTISONE SODIUM SUCCINATE 50 MG: 100 INJECTION, POWDER, FOR SOLUTION INTRAMUSCULAR; INTRAVENOUS at 14:27

## 2020-01-01 RX ADMIN — Medication: at 14:26

## 2020-01-01 RX ADMIN — FUROSEMIDE 40 MG/HR: 10 INJECTION, SOLUTION INTRAMUSCULAR; INTRAVENOUS at 11:21

## 2020-01-01 RX ADMIN — INSULIN LISPRO 8 UNITS: 100 INJECTION, SOLUTION INTRAVENOUS; SUBCUTANEOUS at 01:00

## 2020-01-01 RX ADMIN — SODIUM BICARBONATE 50 MEQ: 84 INJECTION, SOLUTION INTRAVENOUS at 19:03

## 2020-01-01 RX ADMIN — FAMOTIDINE 20 MG: 20 TABLET, FILM COATED ORAL at 09:00

## 2020-01-01 RX ADMIN — FUROSEMIDE 10 MG/HR: 10 INJECTION, SOLUTION INTRAMUSCULAR; INTRAVENOUS at 15:23

## 2020-01-01 RX ADMIN — LEVOTHYROXINE SODIUM ANHYDROUS 50 MCG: 100 INJECTION, POWDER, LYOPHILIZED, FOR SOLUTION INTRAVENOUS at 11:22

## 2020-01-01 RX ADMIN — EPINEPHRINE 15 MCG/MIN: 1 INJECTION PARENTERAL at 15:12

## 2020-01-01 RX ADMIN — INSULIN LISPRO 2 UNITS: 100 INJECTION, SOLUTION INTRAVENOUS; SUBCUTANEOUS at 00:49

## 2020-01-01 RX ADMIN — SODIUM CHLORIDE: 9 INJECTION, SOLUTION INTRAVENOUS at 02:52

## 2020-01-01 RX ADMIN — PIPERACILLIN AND TAZOBACTAM 3.38 G: 3; .375 INJECTION, POWDER, FOR SOLUTION INTRAVENOUS at 00:46

## 2020-01-01 RX ADMIN — SODIUM CHLORIDE: 234 INJECTION INTRAMUSCULAR; INTRAVENOUS; SUBCUTANEOUS at 16:15

## 2020-01-01 RX ADMIN — METOPROLOL TARTRATE 5 MG: 5 INJECTION, SOLUTION INTRAVENOUS at 15:31

## 2020-01-01 RX ADMIN — FUROSEMIDE 40 MG: 10 INJECTION, SOLUTION INTRAMUSCULAR; INTRAVENOUS at 21:22

## 2020-01-01 RX ADMIN — LEVETIRACETAM 1000 MG: 10 INJECTION INTRAVENOUS at 00:11

## 2020-01-01 RX ADMIN — POTASSIUM CHLORIDE 20 MEQ: 400 INJECTION, SOLUTION INTRAVENOUS at 12:29

## 2020-01-01 RX ADMIN — HYDROCORTISONE SODIUM SUCCINATE 25 MG: 100 INJECTION, POWDER, FOR SOLUTION INTRAMUSCULAR; INTRAVENOUS at 16:33

## 2020-01-01 RX ADMIN — HYDROCORTISONE SODIUM SUCCINATE 50 MG: 100 INJECTION, POWDER, FOR SOLUTION INTRAMUSCULAR; INTRAVENOUS at 08:55

## 2020-01-01 RX ADMIN — SODIUM CHLORIDE, PRESERVATIVE FREE 10 ML: 5 INJECTION INTRAVENOUS at 08:35

## 2020-01-01 RX ADMIN — HYDROCORTISONE SODIUM SUCCINATE 100 MG: 100 INJECTION, POWDER, FOR SOLUTION INTRAMUSCULAR; INTRAVENOUS at 15:13

## 2020-01-01 RX ADMIN — SODIUM CHLORIDE 1000 ML: 9 INJECTION, SOLUTION INTRAVENOUS at 10:30

## 2020-01-01 SDOH — HEALTH STABILITY: MENTAL HEALTH: HOW OFTEN DO YOU HAVE A DRINK CONTAINING ALCOHOL?: NEVER

## 2020-01-01 ASSESSMENT — PULMONARY FUNCTION TESTS
PIF_VALUE: 23
PIF_VALUE: 33
PIF_VALUE: 18
PIF_VALUE: 18
PIF_VALUE: 27
PIF_VALUE: 24
PIF_VALUE: 33
PIF_VALUE: 25
PIF_VALUE: 21
PIF_VALUE: 22
PIF_VALUE: 22
PIF_VALUE: 25
PIF_VALUE: 23
PIF_VALUE: 26
PIF_VALUE: 23
PIF_VALUE: 29
PIF_VALUE: 18
PIF_VALUE: 21
PIF_VALUE: 29
PIF_VALUE: 30
PIF_VALUE: 23
PIF_VALUE: 20
PIF_VALUE: 23
PIF_VALUE: 19
PIF_VALUE: 25
PIF_VALUE: 17
PIF_VALUE: 15
PIF_VALUE: 23
PIF_VALUE: 29
PIF_VALUE: 26
PIF_VALUE: 23
PIF_VALUE: 23
PIF_VALUE: 22
PIF_VALUE: 18
PIF_VALUE: 29
PIF_VALUE: 21
PIF_VALUE: 22
PIF_VALUE: 18
PIF_VALUE: 26
PIF_VALUE: 24
PIF_VALUE: 25
PIF_VALUE: 35

## 2020-01-01 ASSESSMENT — PAIN DESCRIPTION - PROGRESSION

## 2020-01-01 ASSESSMENT — PAIN SCALES - GENERAL
PAINLEVEL_OUTOF10: 0
PAINLEVEL_OUTOF10: 0
PAINLEVEL_OUTOF10: 7
PAINLEVEL_OUTOF10: 0

## 2020-01-01 ASSESSMENT — PAIN DESCRIPTION - FREQUENCY: FREQUENCY: CONTINUOUS

## 2020-01-01 ASSESSMENT — PAIN DESCRIPTION - ONSET: ONSET: ON-GOING

## 2020-01-01 ASSESSMENT — PAIN DESCRIPTION - PAIN TYPE: TYPE: ACUTE PAIN

## 2020-07-27 PROBLEM — R41.82 AMS (ALTERED MENTAL STATUS): Status: ACTIVE | Noted: 2020-01-01

## 2020-07-28 PROBLEM — Z87.898 HISTORY OF PITUITARY TUMOR: Status: ACTIVE | Noted: 2020-01-01

## 2020-07-28 PROBLEM — U07.1 COVID-19 VIRUS INFECTION: Status: ACTIVE | Noted: 2020-01-01

## 2020-07-28 PROBLEM — G92.8 TOXIC METABOLIC ENCEPHALOPATHY: Status: ACTIVE | Noted: 2020-01-01

## 2020-07-28 PROBLEM — R56.9 SEIZURE (HCC): Status: ACTIVE | Noted: 2020-01-01

## 2020-07-28 NOTE — CONSULTS
surgical history, medications, social history, and family history, and I have updated the database accordingly.   Past Medical History:     Past Medical History:   Diagnosis Date    Depression     ED (erectile dysfunction)     Hypothyroid     Migraine     Pituitary tumor     Type 2 diabetes mellitus ([facility] [address]     resolved with 50# weight loss     Past Surgical  History:     Past Surgical History:   Procedure Laterality Date    PITUITARY EXCISION  10/05/2016     Medications:      sodium chloride flush  10 mL Intravenous 2 times per day    enoxaparin  30 mg Subcutaneous BID    aspirin  81 mg Oral Daily    atorvastatin  40 mg Oral Nightly    levetiracetam  500 mg Intravenous Q12H     Social History:     Social History     Socioeconomic History    Marital status:      Spouse name: Not on file    Number of children: Not on file    Years of education: Not on file    Highest education level: Not on file   Occupational History    Not on file   Social Needs    Financial resource strain: Not on file    Food insecurity     Worry: Not on file     Inability: Not on file    Transportation needs     Medical: Not on file     Non-medical: Not on file   Tobacco Use    Smoking status: Never Smoker    Smokeless tobacco: Never Used   Substance and Sexual Activity    Alcohol use: Never     Frequency: Never    Drug use: Not on file    Sexual activity: Not on file   Lifestyle    Physical activity     Days per week: Not on file     Minutes per session: Not on file    Stress: Not on file   Relationships    Social connections     Talks on phone: Not on file     Gets together: Not on file     Attends [employer] service: Not on file     Active member of club or organization: Not on file     Attends meetings of clubs or organizations: Not on file     Relationship status: Not on file    Intimate partner violence     Fear of current or ex partner: Not on file     Emotionally abused: Not on file     Physically abused: Not on file     Forced sexual activity: Not on file   Other Topics Concern    Not on file   Social History Narrative    Not on file     Family History:   History reviewed. No pertinent family history. Allergies:   Patient has no known allergies. Review of Systems:   Unable to obtain    Physical Examination :   BP (!) 159/84   Pulse 94   Temp 102.1 °F (38.9 °C)   Resp 14   Ht 5' 10\" (1.778 m)   Wt 170 lb (77.1 kg)   SpO2 98%   BMI 24.39 kg/m²     Temperature Range: Temp: 102.1 °F (38.9 °C) Temp  Av.6 °F (38.7 °C)  Min: 100.7 °F (38.2 °C)  Max: 102.1 °F (38.9 °C)  General Appearance: Awake, alert, and in no apparent distress  Head: Normocephalic, without obvious abnormality, atraumatic  Eyes: pupils equal, round, and reactive to light  ENT: Oropharynx clear, without erythema, exudate, or thrush. Neck: Supple, without lymphadenopathy. Pulmonary/Chest: Clear to auscultation, without wheezes, rales, or rhonchi  Cardiovascular: Regular rate and rhythm without murmurs, rubs, or gallops. Abdomen: Soft, nontender, nondistended. Extremities: No cyanosis, clubbing, edema, or effusions. Neurologic: No gross sensory or motor deficits. Skin: Warm and dry with no rash.     Medical Decision Making:   I have independently reviewed/ordered the following labs:  CBC with Differential:   Recent Labs     20  0807   WBC 5.3 6.3   HGB 14.5 15.6   HCT 43.8 47.1    232   LYMPHOPCT 35  --    MONOPCT 16*  --      BMP:   Recent Labs     20   NA  --  135   K  --  4.0   CL  --  104   CO2  --  16*   BUN  --  11   CREATININE 1.51* 0.94     Hepatic Function Panel:   Recent Labs     20   PROT 7.3   LABALBU 3.8   BILITOT 0.76   ALKPHOS 117   ALT 18   AST 30     No results found for: CRP  No results found for: SEDRATE    Recent Labs     20   PROCAL 0.19*     SARS-CoV-2, Rapid  DETECTEDAbnormal    Not Detected  Final  2020 11:15 PM  [facility]. CTA OF THE CHEST 7/27/2020 8:45 pm     FINDINGS:   No evidence of pulmonary embolism or acute pulmonary abnormality. CTA OF THE HEAD AND NECK WITH CONTRAST 7/27/2020 8:45 pm: FINDINGS:   No large vessel occlusion or hemodynamic stenosis Multilevel degenerate changes cervical spine. Cultures: Thank you for allowing us to participate in the care of this patient. Please call with questions. Electronically signed by John Zuniga MD on 7/28/2020 at 9:06 AM      [facility]  John Zuniga MD  Perfect [employer] messaging  OFFICE: [phone]      This note is created with the assistance of a speech recognition program.  While intending to generate a document that actually reflects the content of the visit, the document can still have some errors including those of syntax and sound a like substitutions which may escape proof reading. In such instances, actual meaning can be extrapolated by contextual diversion.

## 2020-07-28 NOTE — PROGRESS NOTES
Talk to radiologist Dr. Bony Santiago for MRI approval given the patient is COVID positive.   Updated MRI tech about radiology approval.    Amie Fagan MD  Neurology Resident PGY-3  7/28/2020 at 4:41 PM

## 2020-07-28 NOTE — H&P
[facility]    HISTORY AND PHYSICAL EXAMINATION            Date:   7/28/2020  Patient name:  Mckenna Fernandez  Date of admission:  7/27/2020  8:06 PM  MRN:   [id number]  Account:  [de-identified]  YOB: 1960  PCP:    No primary care provider on file. Room:   2004/2004-01  Code Status:    Full Code    Chief Complaint:     Chief Complaint   Patient presents with    Altered Mental Status       History Obtained From:     electronic medical record    History of Present Illness:     Mckenna Fernandez is a 60 y.o. Non-/non  male who presents with Altered Mental Status   and is admitted to the hospital for the management of COVID-19 virus infection. This is a 59-year-old male that presents with alteration in mentation. He is at work and last known well around 2 PM per coworker and he is found on the ground several hours later. Patient currently remains confused and drowsy, has eye opening but does not answer questions. Per the neurology consultation  \"The patient is a 60 y.o. AA male with a previous history of pituitary tumor s/p resection per daughter 2 years ago, who presents to the ED via EMS with   Last known well: of approximately 2PM per work colleague who checked on him and he was fine, however, unsure when exactly symptoms started.     On presentation:  BP: 136/73  BSL: 72 per CMP     Unsure of medications Prior to arrival in particular,  Antiplatelets/anticoagulants:  Statins:  Unknown smoking history     Patient brought in by EMS after being found down unresponsive for an unknown period of time. No witnessed seizures. No prior history of seizures. Reportedly was working in a [employer] without air conditioning and temperature per EMS was 102. Upon arrival to [facility] ED temperature was 100.7. EMS report as well as ED also report patient lost control of bowel and bladder.   Patient also noted to have tongue-biting. \"    Past Medical History:     Past Medical History:   Diagnosis Date    Depression     ED (erectile dysfunction)     Hypothyroid     Migraine     Pituitary tumor     Type 2 diabetes mellitus ([address]     resolved with 50# weight loss        Past Surgical History:     Past Surgical History:   Procedure Laterality Date    PITUITARY EXCISION  10/05/2016        Medications Prior to Admission:     Prior to Admission medications    Not on File   Recent clinical summary indicates that he had been on Cortef, DDAVP, Mobic, Androderm, Synthroid, Zoloft, Megace and trazodone. All medications were marked as no longer taking    Allergies:     Patient has no known allergies. Social History:     Tobacco:    reports that he has never smoked. He has never used smokeless tobacco.  Alcohol:      reports no history of alcohol use. Drug Use:  has no history on file for drug. Family History:     History reviewed. No pertinent family history. Unable be obtained due to altered mental status, not previously documented medical records    Review of Systems:     Unable to be obtained due to altered mental status    Physical Exam:   BP (!) 159/84   Pulse 94   Temp 102.1 °F (38.9 °C)   Resp 14   Ht 5' 10\" (1.778 m)   Wt 170 lb (77.1 kg)   SpO2 98%   BMI 24.39 kg/m²   Temp (24hrs), Av.6 °F (38.7 °C), Min:100.7 °F (38.2 °C), Max:102.1 °F (38.9 °C)    No results for input(s): POCGLU in the last 72 hours.     Intake/Output Summary (Last 24 hours) at 2020 0655  Last data filed at 2020 0026  Gross per 24 hour   Intake 1100 ml   Output 400 ml   Net 700 ml       General Appearance: alert, acutely ill appearing, and in no acute distress  Mental status: Cannot be assessed as patient does not answer questions  Head: normocephalic, atraumatic  Eye: no icterus, redness, pupils equal and reactive, extraocular eye movements intact, conjunctiva clear  Ear: normal external ear, no discharge, hearing intact  Nose: no drainage noted  Mouth: mucous membranes moist  Neck: supple, no carotid bruits, thyroid not palpable  Lungs: Bilateral equal air entry, clear to ausculation, no wheezing, rales or rhonchi, normal effort, diminished throughout  Cardiovascular: normal rate, regular rhythm, no murmur, gallop, rub  Abdomen: Soft, nontender, nondistended, normal bowel sounds, no hepatomegaly or splenomegaly  Neurologic: patient is confused, restless, moving all 4 extremities  Skin: No gross lesions, rashes, bruising or bleeding on exposed skin area  Extremities: peripheral pulses palpable, no pedal edema or calf pain with palpation  Psych: normal affect    Investigations:      Laboratory Testing:  Recent Results (from the past 24 hour(s))   EKG 12 Lead    Collection Time: 07/27/20  8:22 PM   Result Value Ref Range    Ventricular Rate 85 BPM    Atrial Rate 85 BPM    P-R Interval 152 ms    QRS Duration 84 ms    Q-T Interval 342 ms    QTc Calculation (Bazett) 406 ms    P Axis 60 degrees    R Axis 50 degrees    T Axis -15 degrees   CBC Auto Differential    Collection Time: 07/27/20  8:57 PM   Result Value Ref Range    WBC 5.3 3.5 - 11.3 k/uL    RBC 5.14 4.21 - 5.77 m/uL    Hemoglobin 14.5 13.0 - 17.0 g/dL    Hematocrit 43.8 40.7 - 50.3 %    MCV 85.2 82.6 - 102.9 fL    MCH 28.2 25.2 - 33.5 pg    MCHC 33.1 28.4 - 34.8 g/dL    RDW 14.2 11.8 - 14.4 %    Platelets 731 742 - 668 k/uL    MPV 10.1 8.1 - 13.5 fL    NRBC Automated 0.0 0.0 per 100 WBC    Differential Type NOT REPORTED     Seg Neutrophils 46 36 - 65 %    Lymphocytes 35 24 - 43 %    Monocytes 16 (H) 3 - 12 %    Eosinophils % 2 1 - 4 %    Basophils 1 0 - 2 %    Immature Granulocytes 0 0 %    Segs Absolute 2.49 1.50 - 8.10 k/uL    Absolute Lymph # 1.88 1.10 - 3.70 k/uL    Absolute Mono # 0.83 0.10 - 1.20 k/uL    Absolute Eos # 0.09 0.00 - 0.44 k/uL    Basophils Absolute 0.03 0.00 - 0.20 k/uL    Absolute Immature Granulocyte <0.03 0.00 - 0.30 k/uL    WBC Morphology NOT REPORTED     RBC Morphology NOT REPORTED     Platelet Estimate NOT REPORTED    Comprehensive Metabolic Panel    Collection Time: 07/27/20  8:57 PM   Result Value Ref Range    Glucose 72 70 - 99 mg/dL    BUN 11 8 - 23 mg/dL    CREATININE 0.94 0.70 - 1.20 mg/dL    Bun/Cre Ratio NOT REPORTED 9 - 20    Calcium 9.0 8.6 - 10.4 mg/dL    Sodium 135 135 - 144 mmol/L    Potassium 4.0 3.7 - 5.3 mmol/L    Chloride 104 98 - 107 mmol/L    CO2 16 (L) 20 - 31 mmol/L    Anion Gap 15 9 - 17 mmol/L    Alkaline Phosphatase 117 40 - 129 U/L    ALT 18 5 - 41 U/L    AST 30 <40 U/L    Total Bilirubin 0.76 0.3 - 1.2 mg/dL    Total Protein 7.3 6.4 - 8.3 g/dL    Alb 3.8 3.5 - 5.2 g/dL    Albumin/Globulin Ratio 1.1 1.0 - 2.5    GFR Non-African American >60 >60 mL/min    GFR African American >60 >60 mL/min    GFR Comment          GFR Staging NOT REPORTED    Lactate, Sepsis    Collection Time: 07/27/20  8:57 PM   Result Value Ref Range    Lactic Acid, Sepsis NOT REPORTED 0.5 - 1.9 mmol/L    Lactic Acid, Sepsis, Whole Blood 2.0 (H) 0.5 - 1.9 mmol/L   Protime-INR    Collection Time: 07/27/20  8:57 PM   Result Value Ref Range    Protime 11.1 9.0 - 12.0 sec    INR 1.1    APTT    Collection Time: 07/27/20  8:57 PM   Result Value Ref Range    PTT 28.8 20.5 - 30.5 sec   TOX SCR, BLD, ED    Collection Time: 07/27/20  8:57 PM   Result Value Ref Range    Acetaminophen Level <5 (L) 10 - 30 ug/mL    Ethanol <10 <10 mg/dL    Ethanol percent <7.674 <3.891 %    Salicylate Lvl <1 (L) 3 - 10 mg/dL    Toxic Tricyclic Sc,Blood NEGATIVE NEGATIVE   Procalcitonin    Collection Time: 07/27/20  8:57 PM   Result Value Ref Range    Procalcitonin 0.19 (H) <0.09 ng/mL   Urinalysis with Microscopic    Collection Time: 07/27/20  9:54 PM   Result Value Ref Range    Color, UA YELLOW YELLOW    Turbidity UA CLEAR CLEAR    Glucose, Ur NEGATIVE NEGATIVE    Bilirubin Urine NEGATIVE NEGATIVE    Ketones, Urine NEGATIVE NEGATIVE    Specific Gravity, UA 1.052 (H) 1.005 - 1.030    Urine Hgb TRACE (A) NEGATIVE    pH, UA 5.0 5.0 - 8.0    Protein, UA NEGATIVE NEGATIVE    Urobilinogen, Urine Normal Normal    Nitrite, Urine NEGATIVE NEGATIVE    Leukocyte Esterase, Urine NEGATIVE NEGATIVE    -          WBC, UA None 0 - 5 /HPF    RBC, UA 0 TO 2 0 - 4 /HPF    Casts UA  0 - 8 /LPF     0 TO 2 HYALINE Reference range defined for non-centrifuged specimen. Crystals, UA NOT REPORTED None /HPF    Epithelial Cells UA 0 TO 2 0 - 5 /HPF    Renal Epithelial, UA NOT REPORTED 0 /HPF    Bacteria, UA NOT REPORTED None    Mucus, UA NOT REPORTED None    Trichomonas, UA NOT REPORTED None    Amorphous, UA NOT REPORTED None    Other Observations UA NOT REPORTED NOT REQ. Yeast, UA NOT REPORTED None   DRUG SCREEN MULTI URINE    Collection Time: 07/27/20  9:55 PM   Result Value Ref Range    Amphetamine Screen, Ur NEGATIVE NEGATIVE    Barbiturate Screen, Ur NEGATIVE NEGATIVE    Benzodiazepine Screen, Urine NEGATIVE NEGATIVE    Cocaine Metabolite, Urine NEGATIVE NEGATIVE    Methadone Screen, Urine NEGATIVE NEGATIVE    Opiates, Urine NEGATIVE NEGATIVE    Phencyclidine, Urine NEGATIVE NEGATIVE    Propoxyphene, Urine NOT REPORTED NEGATIVE    Cannabinoid Scrn, Ur NEGATIVE NEGATIVE    Oxycodone Screen, Ur NEGATIVE NEGATIVE    Methamphetamine, Urine NOT REPORTED NEGATIVE    Tricyclic Antidepressants, Urine NOT REPORTED NEGATIVE    MDMA, Urine NOT REPORTED NEGATIVE    Buprenorphine Urine NOT REPORTED NEGATIVE    Test Information       Assay provides medical screening only. The absence of expected drug(s) and/or metabolite(s) may indicate diluted or adulterated urine, limitations of testing or timing of collection. COVID-19    Collection Time: 07/27/20 11:15 PM    Specimen: Other   Result Value Ref Range    SARS-CoV-2          SARS-CoV-2, Rapid DETECTED (A) Not Detected    Source . NASOPHARYNGEAL SWAB     SARS-CoV-2, PCR             Imaging/Diagnostics:  Ct Head Wo Contrast    Result Date: 7/27/2020  No acute intracranial abnormality.  Findings were communicated electronically to Dr. Carmelina Montes through the radiology results communication center. Ct Chest Pulmonary Embolism W Contrast    Result Date: 7/27/2020  No evidence of pulmonary embolism or acute pulmonary abnormality. Cta Head Neck W Contrast    Result Date: 7/27/2020  No large vessel occlusion or hemodynamic stenosis Multilevel degenerate changes cervical spine. Assessment :      Hospital Problems           Last Modified POA    * (Principal) COVID-19 virus infection 7/28/2020 Yes    Toxic metabolic encephalopathy 7/90/1666 Yes    Hypothyroid 7/28/2020 Yes    History of pituitary tumor 7/28/2020 Yes    Seizure ([address] 7/28/2020 Yes          Plan:     Patient status inpatient in the Med/Surge    1. Admit inpatient  2. Infectious disease consultation for COVID-19 infection  3. Neurology evaluation, suspect new onset seizure  4. Monitor neurological status, toxic metabolic encephalopathy versus prolonged postictal phase  5. Check thyroid and cortisol levels due to history of pituitary surgery  6. Monitor labs  7. Neurochecks  8. Keppra per neurology  9. Seizure work-up  10. N.p.o. until alert enough to take diet  11. Aspirin and statin until stroke ruled out and pending labs  12. GI and DVT prophylaxis    Consultations:   IP CONSULT TO STROKE TEAM  IP CONSULT TO HOSPITALIST  IP CONSULT TO INFECTIOUS DISEASES     Patient is admitted as inpatient status because of co-morbidities listed above, severity of signs and symptoms as outlined, requirement for current medical therapies and most importantly because of direct risk to patient if care not provided in a hospital setting. Expected length of stay > 48 hours. Babette Lesches, DO  7/28/2020  6:55 AM    Copy sent to Dr. Potter primary care provider on file.

## 2020-07-28 NOTE — CARE COORDINATION
Case Management Initial Discharge Plan  Stalin Mensahid +  Spoke per phone with: patient's  daughter to discuss discharge plans. Information verified: address, contacts, phone number, , insurance Yes    Emergency Contact/Next of Kin name & number:   Medina Zhang (daughter) [phone]    PCP:  Gwenn Ormond physician from [address] not know nameDate of last visit: unsure    Insurance Provider: [facility]    Discharge Planning    Living Arrangements:  son  Support Systems:  Son/daughter, family    Home has 1 stories  3-4 stairs to climb to get into front door,  Location of bedroom/bathroom in home: main    Patient able to perform ADL's:Independent    Current Services (outpatient & in home) none  DME equipment: ? glucometer  DME provider: n/a    Receiving oral anticoagulation therapy? No    If indicated:   Physician managing anticoagulation treatment: n/a  Where does patient obtain lab work for ATC treatment? n/a      Potential Assistance Needed:       Patient agreeable to home care: Yes  Freedom of choice provided:  yes, if needed    Prior SNF/Rehab Placement and Facility: none  Agreeable to SNF/Rehab: Yes  [place] of choice provided: yes, if needed     Evaluation: n/a    Expected Discharge date:       Patient expects to be discharged to:  home  Follow Up Appointment: Best Day/ Time:      Transportation provider: will need, daughter states family will not be able to provide due to patient being Covid+  Transportation arrangements needed for discharge: Yes    Readmission Risk              Risk of Unplanned Readmission:        9             Does patient have a readmission risk score greater than 14?: No  If yes, follow-up appointment must be made within 7 days of discharge. Goals of Care: find reason for confusion      Discharge Plan: goal is home with son.           Electronically signed by Penelope Bernard RN on 20 at 5:52 PM EDT

## 2020-07-28 NOTE — PROGRESS NOTES
Occupational Therapy Not Seen Note    DATE: 2020  Name: Len Rogers  : 1960  MRN: [id number]    Patient not available for Occupational Therapy due to:     Other: pt not appropriate for OOB activity at this time     Next Scheduled Treatment: check back 2020    Electronically signed by MARCO Bryant on 2020 at 10:30 AM

## 2020-07-28 NOTE — ED NOTES
Patient brief, gown, and bedding changed  Patient remains incontinent of stool and urine     Maureen Woody RN  07/28/20 015

## 2020-07-28 NOTE — FLOWSHEET NOTE
[address] [facility] [address]     Emergency/Trauma Note    PATIENT NAME: Melo Green    Shift date: 07/27/2020  Shift day: Monday   Shift # 2    Room # 02/02   Name: Melo Green            Age: 60 y.o. Gender: male          Mosque: Non-Nondenominational   Place of Denominational:     Trauma/Incident type: Stroke Alert  Admit Date & Time: 7/27/2020  8:06 PM  TRAUMA NAME: none        PATIENT/EVENT DESCRIPTION:  Melo Green is a 60 y.o. male who arrived via EMS from scene as a stroke alert. Reportedly patient was working alone and after hours   in hot work environment and was found down after some time, perhaps 2 hours. Pt to be admitted to 02/02. SPIRITUAL ASSESSMENT/INTERVENTION:   went to room found patient in bed. Patient occupied with staff. Daughter Charito Scriver present and attentive to father's condition and diagnosis. Patient went to CT.  offered prayer to daughter, which she accepted, and for which she expressed gratitude. Daughter needed to leave to  her own daughter but was hoping toget medical update later. PATIENT BELONGINGS:  With patient    ANY BELONGINGS OF SIGNIFICANT VALUE NOTED:  Good boots, keys, phone. Wallet which daughter thought should be present was not found. REGISTRATION STAFF NOTIFIED? Yes      WHAT IS YOUR SPIRITUAL CARE PLAN FOR THIS PATIENT?:   Give support ot patient and medical update to daughter as possible and helpful.     Electronically signed by Anila Reilly, on 7/27/2020 at 10:17 PM.  [facility]  [phone]

## 2020-07-28 NOTE — PROCEDURES
Patient with covid positive will not have an mri unless the attending deems emergent, and then they will need to call the radiologist to determine if the benefit of an mri will outweigh the risks. Thanks. Neuro aware and will assess the situation.  If mri is going to be done we will need an mri checklist.

## 2020-07-28 NOTE — ED PROVIDER NOTES
[facility] ED     Emergency Department     Faculty Attestation    I performed a history and physical examination of the patient and discussed management with the resident. I reviewed the residents note and agree with the documented findings and plan of care. Any areas of disagreement are noted on the chart. I was personally present for the key portions of any procedures. I have documented in the chart those procedures where I was not present during the key portions. I have reviewed the emergency nurses triage note. I agree with the chief complaint, past medical history, past surgical history, allergies, medications, social and family history as documented unless otherwise noted below. For Physician Assistant/ Nurse Practitioner cases/documentation I have personally evaluated this patient and have completed at least one if not all key elements of the E/M (history, physical exam, and MDM). Additional findings are as noted. Patient brought in by EMS from work after he was found down unresponsive. Patient was reportedly working in a [employer] without air conditioning. EMS got a temperature of over 102 and patient is 100.7 here. Patient is drowsy but easily arousable to name. He is able to tell me his name and that he is at the hospital but is unable to answer any more complex questions. He does follow commands. He does seem to have left arm and leg weakness compared to the right. There are no obvious signs of trauma present. Will call out a stroke alert. Will also initiate a sepsis work-up. We will plan to admit patient.     EKG Interpretation    Interpreted by emergency department physician    Rhythm: normal sinus   Rate: normal  Axis: normal  Ectopy: none  Conduction: normal  ST Segments: nonspecific changes  T Waves: non specific changes  Q Waves: none    Clinical Impression: non-specific EKG    Dylan Nolen MD  Attending Emergency  Physician              Tayo Quach

## 2020-07-28 NOTE — PROGRESS NOTES
Physical Therapy  DATE: 2020    NAME: Diana Robles  MRN: [id number]   : 1960    Patient not seen this date for Physical Therapy due to:  [] Blood transfusion in progress  [] Hemodialysis  []  Patient Declined  [] Spine Precautions   [] Strict Bedrest  [] Surgery/ Procedure  [] Testing      [x] Other: RN cx- pt confused, not appropriate for out of bed mobility at this time. PT will check back 20. [] PT being discontinued at this time. Patient independent. No further needs. [] PT being discontinued at this time as the patient has been transferred to palliative care. No further needs.     Sakina Allan, PT

## 2020-07-28 NOTE — PLAN OF CARE
Problem: Airway Clearance - Ineffective  Goal: Achieve or maintain patent airway  Outcome: Ongoing     Problem: Gas Exchange - Impaired  Goal: Absence of hypoxia  Outcome: Ongoing  Goal: Promote optimal lung function  Outcome: Ongoing     Problem: Breathing Pattern - Ineffective  Goal: Ability to achieve and maintain a regular respiratory rate  Outcome: Ongoing     Problem:  Body Temperature -  Risk of, Imbalanced  Goal: Ability to maintain a body temperature within defined limits  Outcome: Ongoing  Goal: Will regain or maintain usual level of consciousness  Outcome: Ongoing  Goal: Complications related to the disease process, condition or treatment will be avoided or minimized  Outcome: Ongoing     Problem: Isolation Precautions - Risk of Spread of Infection  Goal: Prevent transmission of infection  Outcome: Ongoing     Problem: Nutrition Deficits  Goal: Optimize nutrtional status  Outcome: Ongoing     Problem: Risk for Fluid Volume Deficit  Goal: Maintain normal heart rhythm  Outcome: Ongoing  Goal: Maintain absence of muscle cramping  Outcome: Ongoing  Goal: Maintain normal serum potassium, sodium, calcium, phosphorus, and pH  Outcome: Ongoing     Problem: Loneliness or Risk for Loneliness  Goal: Demonstrate positive use of time alone when socialization is not possible  Outcome: Ongoing     Problem: Fatigue  Goal: Verbalize increase energy and improved vitality  Outcome: Ongoing     Problem: Patient Education: Go to Patient Education Activity  Goal: Patient/Family Education  Outcome: Ongoing     Problem: Skin Integrity:  Goal: Will show no infection signs and symptoms  Description: Will show no infection signs and symptoms  Outcome: Ongoing  Goal: Absence of new skin breakdown  Description: Absence of new skin breakdown  Outcome: Ongoing     Problem: Falls - Risk of:  Goal: Will remain free from falls  Description: Will remain free from falls  Outcome: Ongoing  Goal: Absence of physical injury  Description: Absence of physical injury  Outcome: Ongoing     Problem: Confusion - Acute:  Goal: Absence of continued neurological deterioration signs and symptoms  Description: Absence of continued neurological deterioration signs and symptoms  Outcome: Ongoing  Goal: Mental status will be restored to baseline  Description: Mental status will be restored to baseline  Outcome: Ongoing     Problem: Discharge Planning:  Goal: Ability to perform activities of daily living will improve  Description: Ability to perform activities of daily living will improve  Outcome: Ongoing  Goal: Participates in care planning  Description: Participates in care planning  Outcome: Ongoing     Problem: Injury - Risk of, Physical Injury:  Goal: Will remain free from falls  Description: Will remain free from falls  Outcome: Ongoing  Goal: Absence of physical injury  Description: Absence of physical injury  Outcome: Ongoing     Problem: Mood - Altered:  Goal: Mood stable  Description: Mood stable  Outcome: Ongoing  Goal: Absence of abusive behavior  Description: Absence of abusive behavior  Outcome: Ongoing  Goal: Verbalizations of feeling emotionally comfortable while being cared for will increase  Description: Verbalizations of feeling emotionally comfortable while being cared for will increase  Outcome: Ongoing     Problem: Psychomotor Activity - Altered:  Goal: Absence of psychomotor disturbance signs and symptoms  Description: Absence of psychomotor disturbance signs and symptoms  Outcome: Ongoing     Problem: Sensory Perception - Impaired:  Goal: Demonstrations of improved sensory functioning will increase  Description: Demonstrations of improved sensory functioning will increase  Outcome: Ongoing  Goal: Decrease in sensory misperception frequency  Description: Decrease in sensory misperception frequency  Outcome: Ongoing  Goal: Able to refrain from responding to false sensory perceptions  Description: Able to refrain from responding to false sensory perceptions  Outcome: Ongoing  Goal: Demonstrates accurate environmental perceptions  Description: Demonstrates accurate environmental perceptions  Outcome: Ongoing  Goal: Able to distinguish between reality-based and nonreality-based thinking  Description: Able to distinguish between reality-based and nonreality-based thinking  Outcome: Ongoing  Goal: Able to interrupt nonreality-based thinking  Description: Able to interrupt nonreality-based thinking  Outcome: Ongoing     Problem: Sleep Pattern Disturbance:  Goal: Appears well-rested  Description: Appears well-rested  Outcome: Ongoing

## 2020-07-28 NOTE — CONSULTS
CONSTITUTIONAL:  Alert and oriented x 3, in mild distress. GCS 15, nontoxic. Mild dysarthria, no aphasia. HEAD:  normocephalic, atraumatic    EYES:  PERRLA, EOMI.   ENT:  moist mucous membranes   NECK:  C-collar in place   BACK:  without midline tenderness, step-offs or deformities    LUNGS:  Equal air entry bilaterally   CARDIOVASCULAR:  normal s1 / s2   ABDOMEN:  Soft, no rigidity   NEUROLOGIC:  Mental Status:  A & O x3,lethargic             Cranial Nerves:    cranial nerves II-XII are grossly intact    Motor Exam:    Drift:  present - RLE  Tone:  normal    Motor exam is 5 out of 5 all extremities with the exception of RLE 4/5    Sensory:    Touch:    Right Upper Extremity:  normal  Left Upper Extremity:  normal  Right Lower Extremity:  normal  Left Lower Extremity:  normal    Deep Tendon Reflexes:    Right Bicep:  2+  Left Bicep:  2+  Right Knee:  2+  Left Knee:  2+    Plantar Response:  Right:  downgoing  Left:  downgoing    Clonus:  absent  Bland's:  absent    Coordination/Dysmetria:  Heel to Shin:  Right:  normal  Left:  normal  Finger to Nose:   Right:  normal  Left:  normal     Gait:  Not tested    INITIAL NIH STROKE SCALE:    Time Performed:  429 PM     1a. Level of consciousness:  1 - not alert but arousable by minor stimulation to obey, answer or respond  1b. Level of consciousness questions:  0 - answers both questions correctly  1c. Level of consciousness questions:  0 - performs both tasks correctly  2. Best Gaze:  0 - normal  3. Visual:  0 - no visual loss  4. Facial Palsy:  0 - normal symmetric movement  5a. Motor left arm:  0 - no drift, limb holds 90 (or 45) degrees for full 10 seconds  5b. Motor right arm:  0 - no drift, limb holds 90 (or 45) degrees for full 10 seconds  6a. Motor left le - no drift; leg holds 30 degree position for full 5 seconds  6b.   Motor right le - some effort against gravity; leg falls to bed by 5 seconds but has some effort against gravity  7. Limb Ataxia:  0 - absent  8. Sensory:  0 - normal; no sensory loss  9. Best Language:  0 - no aphasia, normal  10. Dysarthria:  1 - mild to moderate, patient slurs at least some words and at worst, can be understood with some difficulty  11. Extinction and Inattention:  0 - no abnormality    TOTAL:  4     SKIN:  no rash      LABS AND IMAGING:     CBC with Differential:    Lab Results   Component Value Date    WBC 5.3 07/27/2020    RBC 5.14 07/27/2020    HGB 14.5 07/27/2020    HCT 43.8 07/27/2020     07/27/2020    MCV 85.2 07/27/2020    MCH 28.2 07/27/2020    MCHC 33.1 07/27/2020    RDW 14.2 07/27/2020    LYMPHOPCT 35 07/27/2020    MONOPCT 16 07/27/2020    BASOPCT 1 07/27/2020    MONOSABS 0.83 07/27/2020    LYMPHSABS 1.88 07/27/2020    EOSABS 0.09 07/27/2020    BASOSABS 0.03 07/27/2020    DIFFTYPE NOT REPORTED 07/27/2020         BMP:    Lab Results   Component Value Date     07/27/2020    K 4.0 07/27/2020     07/27/2020    CO2 16 07/27/2020    BUN 11 07/27/2020    LABALBU 3.8 07/27/2020    CREATININE 0.94 07/27/2020    CALCIUM 9.0 07/27/2020    GFRAA >60 07/27/2020    LABGLOM >60 07/27/2020    GLUCOSE 72 07/27/2020       Radiology Review:    1.) CT brain without contrast: 7/27/2020  Impression    No acute intracranial abnormality. 2.) CTA Head/Neck: 7/27/2020  Impression    No large vessel occlusion or hemodynamic stenosis         Multilevel degenerate changes cervical spine. 3.) Brain MRI W & W/O: Pending      ASSESSMENT AND PLAN:       Patient Active Problem List   Diagnosis    Toxic metabolic encephalopathy    COVID-19 virus infection    Hypothyroid    History of pituitary tumor       Assessment                 60 y.o. male who presents to ED via EMS after experiencing loss of consciousness and right lower extremity weakness.   D/D:  Possible seizures   Toxic metabolic encephalopathy   Less likely acute CVA       Recommendations:    [] General Neurology Care Status - prefer 5th floor (5A/5C)   [x] Internal Medicine General Care Status   [] NICU Status - (5B)     [] MICU Status   [] Observation Status      - CT Head  WO : done   - CTA Head and Neck : done   - MRI Brain W and WO : ordered  - EEG    -  Aspirin 81  mg daily   - Clopidogrel to be determined post MRI  - Atorvastatin 40 mg nightly   - Loaded with Keppra 1000mg in ED and resume keppra 500mg BID  - Fasting Lipid panel  - HgbA1c lab    - PT, OT, Speech eval   - Hydrate with 1000cc bolus then IVF NS @ 75cc/hr   - Telemetry   - Neuro checks per protocol  - We recommend SBP <200  - Blood glucose goal less than 180  - Please avoid dextrose containing solutions        Additional recommendations may follow. Discussed with attending Dr. Andreea Vizcarra    Please contact General Neurology with any changes in patients neurologic status. Thank you for your consult.        Javed Morales MD   PGY-2 Neurology  7/27/2020 at 11:16 PM

## 2020-07-28 NOTE — ED NOTES
Writer to speak with resident  Patient remains febrile and writer spoke with concerns over diarrhea  Verbal order for FMS to be placed  Bedding, brief, and gown changed  Patient tolerated well     Tatiana Crespo RN  07/28/20 6034

## 2020-07-28 NOTE — ED NOTES
Blood work and blood cultures collected, labeled, and sent to lab  Patient incontinent of urine and stool  Brief and bedding changed     Annabel Jj RN  07/28/20 [id number]

## 2020-07-28 NOTE — ED NOTES
Patient to ED via EMS taken to room 2  Patient brought here with c/o altered mental status, diarrhea, incontinence, and fall  Per EMS report, patient was found down at work around [address]. Patient was altered and has lost control of his bowel and bladder function.  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

## 2020-07-28 NOTE — CONSULTS
Department of Endovascular Neurosurgery  Resident Consult Note  Stroke Alert paged @ 2046  ER Room # 2  Arrival to patient bedside @ 2051        Reason for Consult:  Stroke alert, LOC, RLE weakness  Requesting Physician:  Darry Mohs, DO  Endovascular Neurosurgeon:   [x]Dr. Didi Huynh  []Dr. Kathleen Boucher     History Obtained From:  patient, family member - daughter at bedside, electronic medical record    CHIEF COMPLAINT:       LOC, RLE weakness, ?stroke    HISTORY OF PRESENT ILLNESS:       The patient is a 60 y.o. AA male with a previous history of pituitary tumor s/p resection per daughter 2 years ago, who presents to the ED via EMS with   Last known well: of approximately 2PM per work colleague who checked on him and he was fine, however, unsure when exactly symptoms started. On presentation:  BP: 136/73  BSL: 72 per CMP    Unsure of medications Prior to arrival in particular,  Antiplatelets/anticoagulants:  Statins:  Unknown smoking history    Patient brought in by EMS after being found down unresponsive for an unknown period of time. No witnessed seizures. No prior history of seizures. Reportedly was working in a [employer] without air conditioning and temperature per EMS was 102. Upon [facility] to [facility] ED temperature was 100.7. EMS report as well as ED also report patient lost control of bowel and bladder. Patient also noted to have tongue-biting. PAST MEDICAL HISTORY :       Past Medical History:    No past medical history on file. Past Surgical History:    No past surgical history on file.     Social History:   Social History     Socioeconomic History    Marital status:      Spouse name: Not on file    Number of children: Not on file    Years of education: Not on file    Highest education level: Not on file   Occupational History    Not on file   Social Needs    Financial resource strain: Not on file    Food insecurity     Worry: Not on file     Inability: Not on file   [employer] needs     Medical: Not on file     Non-medical: Not on file   Tobacco Use    Smoking status: Not on file   Substance and Sexual Activity    Alcohol use: Not on file    Drug use: Not on file    Sexual activity: Not on file   Lifestyle    Physical activity     Days per week: Not on file     Minutes per session: Not on file    Stress: Not on file   Relationships    Social connections     Talks on phone: Not on file     Gets together: Not on file     Attends Jew service: Not on file     Active member of club or organization: Not on file     Attends meetings of clubs or organizations: Not on file     Relationship status: Not on file    Intimate partner violence     Fear of current or ex partner: Not on file     Emotionally abused: Not on file     Physically abused: Not on file     Forced sexual activity: Not on file   Other Topics Concern    Not on file   Social History Narrative    Not on file       Family History:   No family history on file. Allergies:  Patient has no known allergies. Home Medications:  Prior to Admission medications    Not on File       Current Medications:   Current Facility-Administered Medications: 0.9 % sodium chloride bolus, 1,000 mL, Intravenous, Once  levetiracetam (KEPPRA) 1000 mg/100 mL IVPB, 1,000 mg, Intravenous, Once    REVIEW OF SYSTEMS:       CONSTITUTIONAL: negative for fatigue and malaise   EYES: negative for double vision and photophobia    HEENT: negative for tinnitus and sore throat   RESPIRATORY: negative for cough, shortness of breath   CARDIOVASCULAR: negative for chest pain, palpitations   GASTROINTESTINAL: negative for nausea, vomiting   GENITOURINARY: negative for incontinence   MUSCULOSKELETAL: negative for neck or back pain   NEUROLOGICAL: negative for seizures   PSYCHIATRIC: negative for fatigue     Review of systems otherwise negative.     PHYSICAL EXAM:       /73   Pulse 93   Temp 100.7 °F (38.2 °C) (Oral)   Resp 14   Ht 5' 11\" (1.803 m)   Wt 170 lb (77.1 kg)   SpO2 98%   BMI 23.71 kg/m²     CONSTITUTIONAL:  Alert and oriented x 3, in mild distress. GCS 15, nontoxic. Mild dysarthria, no aphasia. HEAD:  normocephalic, atraumatic    EYES:  PERRLA, EOMI.   ENT:  moist mucous membranes   NECK:  C-collar in place   BACK:  without midline tenderness, step-offs or deformities    LUNGS:  Equal air entry bilaterally   CARDIOVASCULAR:  normal s1 / s2   ABDOMEN:  Soft, no rigidity   NEUROLOGIC:  Mental Status:  A & O x3,lethargic             Cranial Nerves:    cranial nerves II-XII are grossly intact    Motor Exam:    Drift:  present - RLE  Tone:  normal    Motor exam is 5 out of 5 all extremities with the exception of RLE 4/5    Sensory:    Touch:    Right Upper Extremity:  normal  Left Upper Extremity:  normal  Right Lower Extremity:  normal  Left Lower Extremity:  normal    Deep Tendon Reflexes:    Right Bicep:  2+  Left Bicep:  2+  Right Knee:  2+  Left Knee:  2+    Plantar Response:  Right:  downgoing  Left:  downgoing    Clonus:  absent  Bland's:  absent    Coordination/Dysmetria:  Heel to Shin:  Right:  normal  Left:  normal  Finger to Nose:   Right:  normal  Left:  normal     Gait:  Not tested    INITIAL NIH STROKE SCALE:    Time Performed:  630 PM     1a. Level of consciousness:  1 - not alert but arousable by minor stimulation to obey, answer or respond  1b. Level of consciousness questions:  0 - answers both questions correctly  1c. Level of consciousness questions:  0 - performs both tasks correctly  2. Best Gaze:  0 - normal  3. Visual:  0 - no visual loss  4. Facial Palsy:  0 - normal symmetric movement  5a. Motor left arm:  0 - no drift, limb holds 90 (or 45) degrees for full 10 seconds  5b. Motor right arm:  0 - no drift, limb holds 90 (or 45) degrees for full 10 seconds  6a. Motor left le - no drift; leg holds 30 degree position for full 5 seconds  6b.   Motor right le - some effort against gravity; leg falls to bed by 5 seconds but has some effort against gravity  7. Limb Ataxia:  0 - absent  8. Sensory:  0 - normal; no sensory loss  9. Best Language:  0 - no aphasia, normal  10. Dysarthria:  1 - mild to moderate, patient slurs at least some words and at worst, can be understood with some difficulty  11. Extinction and Inattention:  0 - no abnormality    TOTAL:  4     SKIN:  no rash      LABS AND IMAGING:     CBC with Differential:    Lab Results   Component Value Date    WBC 5.3 07/27/2020    RBC 5.14 07/27/2020    HGB 14.5 07/27/2020    HCT 43.8 07/27/2020     07/27/2020    MCV 85.2 07/27/2020    MCH 28.2 07/27/2020    MCHC 33.1 07/27/2020    RDW 14.2 07/27/2020    LYMPHOPCT 35 07/27/2020    MONOPCT 16 07/27/2020    BASOPCT 1 07/27/2020    MONOSABS 0.83 07/27/2020    LYMPHSABS 1.88 07/27/2020    EOSABS 0.09 07/27/2020    BASOSABS 0.03 07/27/2020    DIFFTYPE NOT REPORTED 07/27/2020         BMP:    Lab Results   Component Value Date     07/27/2020    K 4.0 07/27/2020     07/27/2020    CO2 16 07/27/2020    BUN 11 07/27/2020    LABALBU 3.8 07/27/2020    CREATININE 0.94 07/27/2020    CALCIUM 9.0 07/27/2020    GFRAA >60 07/27/2020    LABGLOM >60 07/27/2020    GLUCOSE 72 07/27/2020       Radiology Review:    1.) CT brain without contrast: 7/27/2020  Impression    No acute intracranial abnormality. 2.) CTA Head/Neck: 7/27/2020  Impression    No large vessel occlusion or hemodynamic stenosis         Multilevel degenerate changes cervical spine. 3.) Brain MRI W & W/O: Pending      ASSESSMENT AND PLAN:       Patient Active Problem List   Diagnosis    Toxic metabolic encephalopathy    COVID-19 virus infection    Hypothyroid    History of pituitary tumor       Assessment                 60 y.o. male who presents to ED via EMS after experiencing loss of consciousness and right lower extremity weakness.   D/D:  Possible seizures   Toxic metabolic encephalopathy   Less likely acute CVA    1. Last Known Well (date and time): 2PM 7/27/2020 (however not confirmed)    2. Candidate for IV tPA therapy     Yes []     No  [x] due to the following exclusion criteria: low NIH and out of window    3. Candidate for Thrombectomy    Yes []      No [x] due to the following exclusion criteria: no LVO noted    4. Recommend general neurology consultation for further evaluation    - Discussed with Dr. Armani Cerna     Recommendations:    [] General Neurology Care Status - prefer 5th floor (5A/5C)   [x] Internal Medicine General Care Status   [] NICU Status - (5B)     [] MICU Status   [] Observation Status      - CT Head  WO : done   - CTA Head and Neck : done   - MRI Brain W and WO : ordered  - EEG    -  Aspirin 81  mg daily   - Clopidogrel to be determined post MRI  - Atorvastatin 40 mg nightly   - Fasting Lipid panel  - HgbA1c lab    - PT, OT, Speech eval   - Hydrate with 1000cc bolus then IVF NS @ 75cc/hr   - Telemetry   - Neuro checks per protocol  - We recommend SBP <200  - Blood glucose goal less than 180  - Please avoid dextrose containing solutions        Additional recommendations may follow    Please contact [facility] with any changes in patients neurologic status. Thank you for your consult.        Pablo Jasso MD   PGY-2 Neurology  7/27/2020 at 11:16 PM

## 2020-07-28 NOTE — ED PROVIDER NOTES
[facility]  Emergency Department Encounter  EmergencyMedicine Resident     Pt Tyra Kirk  MRN: [id number]  [place] 1960  Date of evaluation: 7/27/20  PCP:  No primary care provider on file. CHIEF COMPLAINT       Chief Complaint   Patient presents with    Altered Mental Status       HISTORY OF PRESENT ILLNESS  (Location/Symptom, Timing/Onset, Context/Setting, Quality, Duration, Modifying Factors, Severity.)      Melo Green is a 60 y.o. male who presents with altered mental status via EMS. Per EMS patient was at work today and passed out positive LOC unknown if patient hit his head. Patient remained altered until in care of EMS EMS gave history that patient had prior brain surgery but was unable to give any other history. Patient is alert to self but not to place or time    PAST MEDICAL / SURGICAL / SOCIAL / FAMILY HISTORY      has a past medical history of Depression, ED (erectile dysfunction), Hypothyroid, Migraine, Pituitary tumor, and Type 2 diabetes mellitus ([facility] [address] has a past surgical history that includes Pituitary excision (10/05/2016).     Social History     Socioeconomic History    Marital status:      Spouse name: Not on file    Number of children: Not on file    Years of education: Not on file    Highest education level: Not on file   Occupational History    Not on file   Social Needs    Financial resource strain: Not on file    Food insecurity     Worry: Not on file     Inability: Not on file    Transportation needs     Medical: Not on file     Non-medical: Not on file   Tobacco Use    Smoking status: Never Smoker    Smokeless tobacco: Never Used   Substance and Sexual Activity    Alcohol use: Never     Frequency: Never    Drug use: Not on file    Sexual activity: Not on file   Lifestyle    Physical activity     Days per week: Not on file     Minutes per session: Not on file    Stress: Not on file   Relationships    Social connections     Talks on phone: Not on file     Gets together: Not on file     Attends Jehovah's witness service: Not on file     Active member of club or organization: Not on file     Attends meetings of clubs or organizations: Not on file     Relationship status: Not on file    Intimate partner violence     Fear of current or ex partner: Not on file     Emotionally abused: Not on file     Physically abused: Not on file     Forced sexual activity: Not on file   Other Topics Concern    Not on file   Social History Narrative    Not on file       History reviewed. No pertinent family history. Allergies:  Patient has no known allergies. Home Medications:  Prior to Admission medications    Not on File       REVIEW OF SYSTEMS    (2-9 systems for level 4, 10 or more for level 5)      Review of Systems   Unable to perform ROS: Mental status change       PHYSICAL EXAM   (up to 7 for level 4, 8 or more for level 5)      INITIAL VITALS:   BP (!) 159/84   Pulse 94   Temp 102.1 °F (38.9 °C)   Resp 14   Ht 5' 10\" (1.778 m)   Wt 170 lb (77.1 kg)   SpO2 98%   BMI 24.39 kg/m²     Physical Exam  Constitutional:       Comments: Patient is altered, will say his name, intermittently respond to commands such as raising arms or legs, is in c-collar, is in no acute distress, is ill-appearing, is not diaphoretic   HENT:      Head: Normocephalic. Comments: Postsurgical changes consistent with craniotomy     Mouth/Throat:      Mouth: Mucous membranes are dry. Pharynx: No oropharyngeal exudate or posterior oropharyngeal erythema. Eyes:      Pupils: Pupils are equal, round, and reactive to light. Comments: Positive corneal reflex, gag reflex, conjunctiva is normal, no eye discharge   Neck:      Comments: In c-collar, no midline spine tenderness  Cardiovascular:      Rate and Rhythm: Normal rate. Pulses: Normal pulses. Heart sounds: Normal heart sounds. Pulmonary:      Effort: Pulmonary effort is normal. No respiratory distress. solution 165 mL    0.9 % sodium chloride bolus    levetiracetam (KEPPRA) 1000 mg/100 mL IVPB    0.9 % sodium chloride infusion    sodium chloride flush 0.9 % injection 10 mL    sodium chloride flush 0.9 % injection 10 mL    OR Linked Order Group     acetaminophen (TYLENOL) tablet 650 mg     acetaminophen (TYLENOL) suppository 650 mg    DISCONTD: acetaminophen (TYLENOL) tablet 650 mg    DISCONTD: acetaminophen (TYLENOL) suppository 650 mg    polyethylene glycol (GLYCOLAX) packet 17 g    OR Linked Order Group     promethazine (PHENERGAN) tablet 12.5 mg     ondansetron (ZOFRAN) injection 4 mg    nicotine (NICODERM CQ) 21 MG/24HR 1 patch    enoxaparin (LOVENOX) injection 30 mg    LORazepam (ATIVAN) injection 2 mg    diphenhydrAMINE (BENADRYL) injection 25 mg       DDX: ACS/MI, PE, stroke, COVID-19, ingestion, infection, seizure, metabolic encephalopathy    DIAGNOSTIC RESULTS / EMERGENCY DEPARTMENT COURSE / MDM   :  Results for orders placed or performed during the hospital encounter of 07/27/20   CBC Auto Differential   Result Value Ref Range    WBC 5.3 3.5 - 11.3 k/uL    RBC 5.14 4.21 - 5.77 m/uL    Hemoglobin 14.5 13.0 - 17.0 g/dL    Hematocrit 43.8 40.7 - 50.3 %    MCV 85.2 82.6 - 102.9 fL    MCH 28.2 25.2 - 33.5 pg    MCHC 33.1 28.4 - 34.8 g/dL    RDW 14.2 11.8 - 14.4 %    Platelets 086 525 - 598 k/uL    MPV 10.1 8.1 - 13.5 fL    NRBC Automated 0.0 0.0 per 100 WBC    Differential Type NOT REPORTED     Seg Neutrophils 46 36 - 65 %    Lymphocytes 35 24 - 43 %    Monocytes 16 (H) 3 - 12 %    Eosinophils % 2 1 - 4 %    Basophils 1 0 - 2 %    Immature Granulocytes 0 0 %    Segs Absolute 2.49 1.50 - 8.10 k/uL    Absolute Lymph # 1.88 1.10 - 3.70 k/uL    Absolute Mono # 0.83 0.10 - 1.20 k/uL    Absolute Eos # 0.09 0.00 - 0.44 k/uL    Basophils Absolute 0.03 0.00 - 0.20 k/uL    Absolute Immature Granulocyte <0.03 0.00 - 0.30 k/uL    WBC Morphology NOT REPORTED     RBC Morphology NOT REPORTED     Platelet Estimate NOT REPORTED    Comprehensive Metabolic Panel   Result Value Ref Range    Glucose 72 70 - 99 mg/dL    BUN 11 8 - 23 mg/dL    CREATININE 0.94 0.70 - 1.20 mg/dL    Bun/Cre Ratio NOT REPORTED 9 - 20    Calcium 9.0 8.6 - 10.4 mg/dL    Sodium 135 135 - 144 mmol/L    Potassium 4.0 3.7 - 5.3 mmol/L    Chloride 104 98 - 107 mmol/L    CO2 16 (L) 20 - 31 mmol/L    Anion Gap 15 9 - 17 mmol/L    Alkaline Phosphatase 117 40 - 129 U/L    ALT 18 5 - 41 U/L    AST 30 <40 U/L    Total Bilirubin 0.76 0.3 - 1.2 mg/dL    Total Protein 7.3 6.4 - 8.3 g/dL    Alb 3.8 3.5 - 5.2 g/dL    Albumin/Globulin Ratio 1.1 1.0 - 2.5    GFR Non-African American >60 >60 mL/min    GFR African American >60 >60 mL/min    GFR Comment          GFR Staging NOT REPORTED    Lactate, Sepsis   Result Value Ref Range    Lactic Acid, Sepsis NOT REPORTED 0.5 - 1.9 mmol/L    Lactic Acid, Sepsis, Whole Blood 2.0 (H) 0.5 - 1.9 mmol/L   Protime-INR   Result Value Ref Range    Protime 11.1 9.0 - 12.0 sec    INR 1.1    APTT   Result Value Ref Range    PTT 28.8 20.5 - 30.5 sec   Urinalysis with Microscopic   Result Value Ref Range    Color, UA YELLOW YELLOW    Turbidity UA CLEAR CLEAR    Glucose, Ur NEGATIVE NEGATIVE    Bilirubin Urine NEGATIVE NEGATIVE    Ketones, Urine NEGATIVE NEGATIVE    Specific Gravity, UA 1.052 (H) 1.005 - 1.030    Urine Hgb TRACE (A) NEGATIVE    pH, UA 5.0 5.0 - 8.0    Protein, UA NEGATIVE NEGATIVE    Urobilinogen, Urine Normal Normal    Nitrite, Urine NEGATIVE NEGATIVE    Leukocyte Esterase, Urine NEGATIVE NEGATIVE    -          WBC, UA None 0 - 5 /HPF    RBC, UA 0 TO 2 0 - 4 /HPF    Casts UA  0 - 8 /LPF     0 TO 2 HYALINE Reference range defined for non-centrifuged specimen.     Crystals, UA NOT REPORTED None /HPF    Epithelial Cells UA 0 TO 2 0 - 5 /HPF    Renal Epithelial, UA NOT REPORTED 0 /HPF    Bacteria, UA NOT REPORTED None    Mucus, UA NOT REPORTED None    Trichomonas, UA NOT REPORTED None    Amorphous, UA NOT REPORTED None Other Observations UA NOT REPORTED NOT REQ. Yeast, UA NOT REPORTED None   COVID-19    Specimen: Other   Result Value Ref Range    SARS-CoV-2          SARS-CoV-2, Rapid DETECTED (A) Not Detected    Source . NASOPHARYNGEAL SWAB     SARS-CoV-2, PCR         DRUG SCREEN MULTI URINE   Result Value Ref Range    Amphetamine Screen, Ur NEGATIVE NEGATIVE    Barbiturate Screen, Ur NEGATIVE NEGATIVE    Benzodiazepine Screen, Urine NEGATIVE NEGATIVE    Cocaine Metabolite, Urine NEGATIVE NEGATIVE    Methadone Screen, Urine NEGATIVE NEGATIVE    Opiates, Urine NEGATIVE NEGATIVE    Phencyclidine, Urine NEGATIVE NEGATIVE    Propoxyphene, Urine NOT REPORTED NEGATIVE    Cannabinoid Scrn, Ur NEGATIVE NEGATIVE    Oxycodone Screen, Ur NEGATIVE NEGATIVE    Methamphetamine, Urine NOT REPORTED NEGATIVE    Tricyclic Antidepressants, Urine NOT REPORTED NEGATIVE    MDMA, Urine NOT REPORTED NEGATIVE    Buprenorphine Urine NOT REPORTED NEGATIVE    Test Information       Assay provides medical screening only. The absence of expected drug(s) and/or metabolite(s) may indicate diluted or adulterated urine, limitations of testing or timing of collection. TOX SCR, BLD, ED   Result Value Ref Range    Acetaminophen Level <5 (L) 10 - 30 ug/mL    Ethanol <10 <10 mg/dL    Ethanol percent <3.968 <8.891 %    Salicylate Lvl <1 (L) 3 - 10 mg/dL    Toxic Tricyclic Sc,Blood NEGATIVE NEGATIVE         RADIOLOGY:  No radiation information found for this patient    Narrative    EXAMINATION:    CTA OF THE CHEST 7/27/2020 8:45 pm         TECHNIQUE:    CTA of the chest was performed after the administration of intravenous    contrast.  Multiplanar reformatted images are provided for review.  MIP    images are provided for review.  Dose modulation, iterative reconstruction,    and/or weight based adjustment of the mA/kV was utilized to reduce the    radiation dose to as low as reasonably achievable.         COMPARISON:    None.         HISTORY:    ORDERING SYSTEM PROVIDED HISTORY: AMS    TECHNOLOGIST PROVIDED HISTORY:    AMS    Reason for Exam: AMS    Acuity: Acute    Type of Exam: Initial         FINDINGS:    Pulmonary Arteries: Pulmonary arteries are adequately opacified for    evaluation.  No evidence of intraluminal filling defect to suggest pulmonary    embolism.  Main pulmonary artery is normal in caliber.         Mediastinum: No evidence of mediastinal lymphadenopathy.  The heart and    pericardium demonstrate no acute abnormality.  There is no acute abnormality    of the thoracic aorta.         Lungs/pleura: The lungs are without acute process.  No focal consolidation or    pulmonary edema.  No evidence of pleural effusion or pneumothorax.         Upper Abdomen: Limited images of the upper abdomen are unremarkable.         Soft Tissues/Bones: No acute bone or soft tissue abnormality.  Multilevel    degenerative changes in the visualized spine.              Impression    No evidence of pulmonary embolism or acute pulmonary abnormality. Narrative    EXAMINATION:    CTA OF THE HEAD AND NECK WITH CONTRAST 7/27/2020 8:45 pm:         TECHNIQUE:    CTA of the head and neck was performed with the administration of intravenous    contrast. Multiplanar reformatted images are provided for review.  MIP images    are provided for review. Stenosis of the internal carotid arteries measured    using NASCET criteria.  Dose modulation, iterative reconstruction, and/or    weight based adjustment of the mA/kV was utilized to reduce the radiation    dose to as low as reasonably achievable.         3D reconstructed images were performed on a separate workstation and provided    for review.         COMPARISON:    CT head 07/27/2020         HISTORY:    ORDERING SYSTEM PROVIDED HISTORY: AMS    TECHNOLOGIST PROVIDED HISTORY:    AMS    Reason for Exam: stroke alert ams    Acuity: Acute    Type of Exam: Initial         FINDINGS:         CTA NECK:         AORTIC ARCH/ARCH VESSELS: No dissection or arterial injury.  No significant    stenosis of the brachiocephalic or subclavian arteries.         CAROTID ARTERIES: There is motion involving the right proximal to mid    cervical ICA challenging evaluation for intimal injury or subtle areas of    dissection or atherosclerotic disease.  No definite dissection, arterial    injury, or hemodynamically significant stenosis by NASCET criteria.         VERTEBRAL ARTERIES: No dissection, arterial injury, or significant stenosis.         SOFT TISSUES: The lung apices are clear.  No cervical or superior mediastinal    lymphadenopathy.  The larynx and pharynx are unremarkable.  No acute    abnormality of the salivary and thyroid glands.         BONES: Multilevel degenerate change with moderate severe disc space disease    notably C5 through C7.  There is up to severe central canal narrowing C5-C6    and C6-C7.  Moderate multilevel facet arthropathy.              CTA HEAD:         ANTERIOR CIRCULATION: No significant stenosis of the intracranial internal    carotid, anterior cerebral, or middle cerebral arteries. No aneurysm.         POSTERIOR CIRCULATION: Bilateral P comms.  No significant stenosis of the    vertebral, basilar, or posterior cerebral arteries. No aneurysm.         OTHER: No dural venous sinus thrombosis on this non-dedicated study.         BRAIN: No mass effect or midline shift. No extra-axial fluid collection. The    gray-white differentiation is maintained.              Impression    No large vessel occlusion or hemodynamic stenosis         Multilevel degenerate changes cervical spine.       EXAMINATION:    CT OF THE HEAD WITHOUT CONTRAST  7/27/2020 8:46 pm         TECHNIQUE:    CT of the head was performed without the administration of intravenous    contrast. Dose modulation, iterative reconstruction, and/or weight based    adjustment of the mA/kV was utilized to reduce the radiation dose to as low    as reasonably achievable.      COMPARISON:    None.         HISTORY:    ORDERING SYSTEM PROVIDED HISTORY: AMS    TECHNOLOGIST PROVIDED HISTORY:    AMS         Reason for Exam: AMS    Acuity: Acute    Type of Exam: Initial         FINDINGS:    BRAIN/VENTRICLES: There is no acute intracranial hemorrhage, mass effect or    midline shift.  No abnormal extra-axial fluid collection.  The gray-white    differentiation is maintained without evidence of an acute infarct.  There is    no evidence of hydrocephalus. Suspect minimal encephalomalacia left posterior    parietal region.  Minor periventricular and subcortical white matter    hypoattenuation suggestive chronic microvascular disease.  Vascular    calcifications.         ORBITS: The visualized portion of the orbits demonstrate no acute abnormality.         SINUSES: The visualized paranasal sinuses and mastoid air cells demonstrate    no acute abnormality.         SOFT TISSUES/SKULL:  No acute abnormality of the visualized skull or soft    tissues.              Impression    No acute intracranial abnormality.         Findings were communicated electronically to Dr. Carmelina Montes through the radiology    results communication center.           EKG  Rhythm: normal sinus   Rate: normal  Axis: normal  Ectopy: none  Conduction: normal  ST Segments: nonspecific changes  T Waves: non specific changes  Q Waves: none     Clinical Impression: non-specific EKG    All EKG's are interpreted by the Emergency Department Physician who either signs or Co-signs this chart in the absence of a cardiologist.    EMERGENCY DEPARTMENT COURSE/IMPRESSION: 79-year-old male presenting with altered mental status, was placed in c-collar, unable to obtain review of systems, patient had left-sided weakness, with difficulty following commands, stroke alert was called, stroke team to evaluate patient, CT rule out PE, head, CTA head and neck were unremarkable, patient was loaded with Keppra per neurology recommendation, patient was febrile,

## 2020-07-28 NOTE — ED NOTES
Bed: 02  Expected date: 7/27/20  Expected time: 7:59 PM  Means of arrival: [facility]  Comments:  GUILLERMO Mata [id number], RN  07/27/20 2006

## 2020-07-29 NOTE — PROCEDURES
LONG-TERM EEG-VIDEO [id number] [address]    Patient: Juliocesar Porras  Age: 60 y.o. MRN: [id number]    Referring Physician: No ref. provider found  History: The patient is a 60 y.o. male who presented breakthrough seizure/encephalopathy. This long-term video-EEG monitoring study was performed to determine the nature of the patient's clinical events. The patient is on neuroactive medications.    Juliocesar Porras   Current Facility-Administered Medications   Medication Dose Route Frequency Provider Last Rate Last Dose    0.9 % sodium chloride infusion   Intravenous Continuous DARREN Patino  mL/hr at 07/28/20 1630      sodium chloride flush 0.9 % injection 10 mL  10 mL Intravenous 2 times per day DARREN Kulkarni CNP        sodium chloride flush 0.9 % injection 10 mL  10 mL Intravenous PRN DARREN Kulkarni CNP        acetaminophen (TYLENOL) tablet 650 mg  650 mg Oral Q6H PRN DARREN Kulkarni CNP        Or    acetaminophen (TYLENOL) suppository 650 mg  650 mg Rectal Q6H PRN DARREN Kulkarni - CNP   650 mg at 07/28/20 2322    polyethylene glycol (GLYCOLAX) packet 17 g  17 g Oral Daily PRN DARREN Kulkarni CNP        promethazine (PHENERGAN) tablet 12.5 mg  12.5 mg Oral Q6H PRN DARREN Kulkarni CNP        Or    ondansetron (ZOFRAN) injection 4 mg  4 mg Intravenous Q6H PRN DARREN Kulkarni CNP        nicotine (NICODERM CQ) 21 MG/24HR 1 patch  1 patch Transdermal Daily PRN DARREN Kulkarni CNP        enoxaparin (LOVENOX) injection 30 mg  30 mg Subcutaneous BID DARREN Kulkarni - CNP   30 mg at 07/28/20 2023    LORazepam (ATIVAN) injection 2 mg  2 mg Intravenous Q4H PRN DARREN Kulkarni CNP        diphenhydrAMINE (BENADRYL) injection 25 mg  25 mg Intravenous Q6H PRN DARREN Becker - CNP   25 mg at 07/28/20 2321    aspirin EC tablet 81 mg  81 mg Oral Daily Trevon Vilchis MD        atorvastatin (LIPITOR) tablet 40 mg  40 mg Oral Nightly Trevon Vilchis MD        glucose (GLUTOSE) 40 % oral gel 15 g  15 g Oral PRPANCHO Maxwell MD        dextrose 50 % IV solution  12.5 g Intravenous PRPANCHO Maxwell MD        glucagon (rDNA) injection 1 mg  1 mg Intramuscular PRN Ricci Maxwell MD        dextrose 5 % solution  100 mL/hr Intravenous PRN Ricci Maxwell MD        insulin lispro (HUMALOG) injection vial 0-6 Units  0-6 Units Subcutaneous TID WC Ricci Maxwell MD        insulin lispro (HUMALOG) injection vial 0-3 Units  0-3 Units Subcutaneous Nightly Ricci Maxwell MD        metoprolol (LOPRESSOR) injection 2.5 mg  2.5 mg Intravenous Q6H PRN Ricci Maxwell MD   2.5 mg at 07/29/20 0555    levetiracetam (KEPPRA) 1000 mg/100 mL IVPB  1,000 mg Intravenous Q12H Trevon Vilchis MD         Technical Description: This is a 21-channel digital EEG recording with time-locked video. Electrodes were placed in accordance with the 10-20 International System of Electrode Placement. Single lead EKG monitoring was included. Baseline EEG Recording:  A formal baseline EEG recording was not obtained. Day 1 - 7/28/20, starting at 17:40    Interictal EEG Samples: The background activity consisted of 4 to 5 Hz of 15-30 µV. There was poor anterior posterior amplitude gradient. During the recording, near continuous frontally dominant 80 to 120 µV 2-2.5 Hz rhythmic delta activity was seen which waxed and waned and at times appeared to be on ictal/interictal spectrum. At times, these waves have apiculate appearing triphasic morphology. The EKG channel revealed no abnormalities. Ictal EEG Recording / Patient Events: During this period the patient had near continuous frontally dominant rhythmic delta activity at  2-2.5 Hz which appeared to be on ictal interictal spectrum.     Summary: During this day of recording no events were recorded. The interictal EEG was abnormal due to diffuse polymorphic delta theta slowing suggesting moderate to severe encephalopathy. Near continuous frontally dominant rhythmic delta activity appeared to be on ictal-interictal spectrum and was concerning for subclinical seizures. Monitoring was continued in order to record the patient's typical events. The EKG channel revealed no abnormalities. Day 2 - 7/29/20, reviewed through 13:15    Interictal EEG Samples: Interictal EEG was unchanged from yesterday. Frequent runs of generalized periodic discharges with triphasic morphology were seen. Ictal EEG Recording / Patient Events: During this period the patient had no events or seizures. Summary: During this day of recording no events were recorded. The interictal EEG was abnormal due to diffuse polymorphic delta theta slowing suggesting moderate to severe encephalopathy. Near continuous frontally dominant rhythmic delta activity appeared to be on ictal-interictal spectrum and was concerning for subclinical seizures. The EEG was essentially unchanged from yesterday. Monitoring was continued in order to record the patient's typical events. The EKG channel revealed no abnormalities. Olga Simeon MD  Diplomate, American Board of Psychiatry and Neurology  Diplomate, American Board of Clinical Neurophysiology  Diplomate, American Board of Epilepsy     Please note this is a preliminary report and updated daily. The final report will have a summary of behavior and electrographic findings with clinical correlation.

## 2020-07-29 NOTE — PROGRESS NOTES
1348 I amp bicarb given. 1353 1 amp bicarb given.    1353 2 amps calcium chloride given  1402- 100mg Solu cortef given  1416 1 amp bicarb given  1416- 2 amps of dextrose given    All verbal orders from Dr. Nay Ferguson

## 2020-07-29 NOTE — PROGRESS NOTES
ISTAT POC results obtained and reported    Critical values noted pH 7.044    Person Notified (name) Eual Counter  Time [address]  4:13 PM

## 2020-07-29 NOTE — PROGRESS NOTES
NEUROLOGY INPATIENT PROGRESS NOTE    7/29/2020         Subjective: Omari Curtis is a  60 y.o. male admitted on7/27/2020 with AMS (altered mental status) [R41.82]      Briefly, this is a  60 y.o. male admitted on 7/27/2020 with past medical history of type 2 diabetes, migraine, pituitary tumor status post resection 2 years ago hypothyroid, depression, erectile dysfunction came to the ED as a stroke alert. Last well-known was 2 PM at work unsure of the last well-known). 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 ED patient had a CT head without contrast which was unremarkable for acute intracranial pathology, CTA head and neck with contrast showed no large vessel occlusion, CT chest was negative for PE. Labs showed U tox negative, ethanol level less than 10, d-dimer 0.95 fibrinogen 462. Blood culture and urine culture were sent as well as COVID-19. Patient was loaded with Keppra 1g and placed on  500 twice daily. Patient was noted to be positive for COVID-19        Patient is seen and evaluated at bedside, as per epileptologist Donna Och EEG showed diffuse polymorphic delta theta slowing suggestive of moderate to severe encephalopathy, near continuous frontal dominant rhythm delta activity concerning for subclinical seizures patient received 500 mg of Keppra at 11 PM and maintenance changed to 1000mg q 12.   MRI limited brain still pending               On examination patient vitals are:    /62   Pulse 119   Temp 102.7 °F (39.3 °C) (Axillary)   Resp 26   Ht 5' 10\" (1.778 m)   Wt 170 lb (77.1 kg)   SpO2 99%   BMI 24.39 kg/m²     Blood pressure range: Systolic (24YGL), XYB:032 , Min:113 , TVI:380   ; Diastolic (74EAT), RKV:44, Min:62, Max:95          No current facility-administered medications on file prior to encounter. No current outpatient medications on file prior to encounter. Allergies: Lex Corrales has No Known Allergies.     Past Medical History:   Diagnosis Date    Depression     ED (erectile dysfunction)     Hypothyroid     Migraine     Pituitary tumor     Type 2 diabetes mellitus (HCC)     resolved with 50# weight loss       Past Surgical History:   Procedure Laterality Date    PITUITARY EXCISION  10/05/2016           Medications:     sodium chloride flush  10 mL Intravenous 2 times per day    enoxaparin  30 mg Subcutaneous BID    aspirin  81 mg Oral Daily    atorvastatin  40 mg Oral Nightly    insulin lispro  0-6 Units Subcutaneous TID WC    insulin lispro  0-3 Units Subcutaneous Nightly    levetiracetam  1,000 mg Intravenous Q12H     PRN Meds include: sodium chloride flush, acetaminophen **OR** acetaminophen, polyethylene glycol, promethazine **OR** ondansetron, nicotine, LORazepam, diphenhydrAMINE, glucose, dextrose, glucagon (rDNA), dextrose, metoprolol    Objective:   /62   Pulse 119   Temp 102.7 °F (39.3 °C) (Axillary)   Resp 26   Ht 5' 10\" (1.778 m)   Wt 170 lb (77.1 kg)   SpO2 99%   BMI 24.39 kg/m²     Blood pressure range: Systolic (67QRU), BOD:658 , Min:113 , ORU:908   ; Diastolic (40VXQ), DO, Min:62, Max:95      Review of Systems    Review of Systems:     ROS:  Constitutional  Negative for fever and chills    HEENT  Negative for ear discharge, ear pain, nosebleed    Eyes  Negative for photophobia, pain and discharge    Respiratory  Negative for hemoptysis and sputum    Cardiovascular  Negative for orthopnea, claudication and PND    Gastrointestinal  Negative for abdominal pain, diarrhea, blood in stool    Musculoskeletal  Negative for joint pain, negative for myalgia    Neurology Negative for

## 2020-07-29 NOTE — PROGRESS NOTES
[facility]      Daily Progress Note     Admit Date: 7/27/2020  Bed/Room No.  2004/2004-01  Admitting Physician : Asher Rocha MD  Code Status :[address] Day:  LOS: 2 days   Chief Complaint:     Chief Complaint   Patient presents with    Altered Mental Status     Principal Problem:    COVID-19 virus infection  Active Problems:    Toxic metabolic encephalopathy    Hypothyroid    History of pituitary tumor    Seizure ([facility] [address]    Somnolence  Resolved Problems:    * No resolved hospital problems. *    Subjective : Interval History/Significant events :  07/29/20    Patient had acute deterioration of his clinical status. He had PEA arrest right afternoon time. CPR was performed and patient had successful ROSC after 2 rounds of CPR. Patient had new onset A. fib and was given 150 mg bolus amiodarone and converted to normal sinus rhythm. Patient coded again 2 times and had successful ROSC again. Patient was intubated, central line placed by critical care. Patient was transferred to [facility] ICU. Patient had another PEA arrest, and assisted with epinephrine and CPR. Patient is not on sedation and is not responding much. He is having persistent watery copious diarrhea. Rectal tube is in place. He was started on Levophed for shock. Vitals - Unstable afebrile  Labs -lactic acidosis, anion gap metabolic acidosis, LEILA     Nursing notes , Consults notes reviewed. Overnight events and updates discussed with Nursing staff . Background History:         Len Rogers is 60 y.o. male  Who was admitted to the hospital on 7/27/2020 for treatment of COVID-19 virus infection. Patient was brought to emergency room with altered mental status. He was at work and was found on the ground several hours later. Patient was confused, drowsy and not answering questions. He did not have any focal weakness. Evaluation in ER showed temperature 102.  Lab evaluation showed low cortisol level, low TSH. COVID-19 test was positive. CT chest was negative for PE and did not show any Pneumonia. Neurology was consulted for possible TIA Vs syncope. CT head was negative for acute IC abnormality. Patient has history of pituitary adenoma S/P resection in 10/2016 at [facility] clinic by Dr. Anahi Spencer. Patient had failed to follow-up with the clinic. Patient follows with Dr. Raya Martinez MD.  It appears from outpatient note with PCP on 5/13/2020 that patient has not been taking his medications. He has not been following his endocrinologist.  He has chronic pituitary insufficiency and was supposed to be on hydrocortisone, desmopressin, levothyroxine, testosterone and diabetic medications. He has lost 50 pounds and had stopped all medications. Patient was found to have  decrease cortisol and TSH levels. Patient was lethargic and confused. He had PEA arrest on 7/29/2020 with successful ROSC after couple rounds of CPR followed by multiple PEA arrest.  He has circulatory shock and needed intubation, pressor support. PMH:  Past Medical History:   Diagnosis Date    Depression     ED (erectile dysfunction)     Hypothyroid     Migraine     Pituitary tumor     Type 2 diabetes mellitus (HCC)     resolved with 50# weight loss      Allergies: No Known Allergies   Medications :  sodium chloride flush, 10 mL, Intravenous, 2 times per day  enoxaparin, 30 mg, Subcutaneous, BID  aspirin, 81 mg, Oral, Daily  atorvastatin, 40 mg, Oral, Nightly  insulin lispro, 0-6 Units, Subcutaneous, TID WC  insulin lispro, 0-3 Units, Subcutaneous, Nightly  levetiracetam, 1,000 mg, Intravenous, Q12H    Home medications   acetaminophen (TYLENOL) 500 mg tablet, Take 2 tablets (1,000 mg total) by mouth every 12 (twelve) hours. , Disp: 100 tablet, Rfl: 2   desmopressin (DDAVP) 0.1 mg tablet, Take 1 tablet (100 mcg total) by mouth 2 (two) times a day.  (Patient not taking: Reported on 5/13/2019 ), Disp: 60 tablet, Rfl: 6   src Pulse Resp SpO2   07/28/20 2357 113/62 102.7 °F (39.3 °C) Axillary -- -- --   07/28/20 1830 -- -- -- 119 26 99 %   07/28/20 1645 (!) 169/95 98.9 °F (37.2 °C) Oral -- -- --   07/28/20 1200 (!) 161/77 99.1 °F (37.3 °C) Oral 88 18 99 %   07/28/20 0927 (!) 159/82 99 °F (37.2 °C) Oral 82 18 97 %     Intake / output   07/28 0701 - 07/29 0700  In: 1175 [I.V.:1075]  Out: 1125 [Urine:525]  Physical Exam:  Physical Exam  Vitals signs and nursing note reviewed. Constitutional:       Appearance: He is well-developed. He is ill-appearing. Interventions: He is sedated and intubated. Eyes:      Pupils:      Right eye: Pupil is not reactive. Left eye: Pupil is not reactive. Comments: Dilated   Neck:      Thyroid: No thyroid mass. Cardiovascular:      Rate and Rhythm: Normal rate and regular rhythm. Pulses: Normal pulses. Heart sounds: Normal heart sounds. No murmur. Pulmonary:      Effort: He is intubated. Breath sounds: Normal breath sounds. Musculoskeletal:      Right lower leg: No edema. Left lower leg: No edema. Lymphadenopathy:      Cervical: No cervical adenopathy. Skin:     Capillary Refill: Capillary refill takes less than 2 seconds. Neurological:      GCS: GCS eye subscore is 1. GCS verbal subscore is 1. GCS motor subscore is 1. Motor: No tremor or abnormal muscle tone.            Laboratory findings:    Recent Labs     07/27/20 2057 07/28/20  0807   WBC 5.3 6.3   HGB 14.5 15.6   HCT 43.8 47.1    232   INR 1.1 1.0     Recent Labs     07/27/20 2033 07/27/20 2057 07/28/20  0807   NA  --  135 137   K  --  4.0 3.7   CL  --  104 106   CO2  --  16* 14*   GLUCOSE  --  72 70   BUN  --  11 13   CREATININE 1.51* 0.94 1.24*   CALCIUM  --  9.0 9.5     Recent Labs     07/27/20 2057 07/28/20  0807 07/28/20  2230   PROT 7.3 8.1  --    LABALBU 3.8 4.2  --    LABA1C  --  5.3  --    TSH  --   --  <0.01*   AST 30 44*  --    ALT 18 27  --    LDH  --  243*  --    ALKPHOS 117 126  --    BILITOT 0.76 0.56  --    CHOL  --  150  --    HDL  --  24*  --    LDLCHOLESTEROL  --  100  --    CHOLHDLRATIO  --  6.3*  --    TRIG  --  131  --    VLDL  --  NOT REPORTED  --           Specific Gravity, UA   Date Value Ref Range Status   07/27/2020 1.052 (H) 1.005 - 1.030 Final     Protein, UA   Date Value Ref Range Status   07/27/2020 NEGATIVE NEGATIVE Final     RBC, UA   Date Value Ref Range Status   07/27/2020 0 TO 2 0 - 4 /HPF Final     Comment:     Reference range defined for non-centrifuged specimen. Bacteria, UA   Date Value Ref Range Status   07/27/2020 NOT REPORTED None Final     Nitrite, Urine   Date Value Ref Range Status   07/27/2020 NEGATIVE NEGATIVE Final     WBC, UA   Date Value Ref Range Status   07/27/2020 None 0 - 5 /HPF Final     Leukocyte Esterase, Urine   Date Value Ref Range Status   07/27/2020 NEGATIVE NEGATIVE Final       Imaging / Clinical Data :-   Ct Head Wo Contrast    Result Date: 7/27/2020  No acute intracranial abnormality. Findings were communicated electronically to Dr. Stella Zuniga through the radiology results communication center. Ct Chest Pulmonary Embolism W Contrast    Result Date: 7/27/2020  No evidence of pulmonary embolism or acute pulmonary abnormality. Cta Head Neck W Contrast    Result Date: 7/27/2020  No large vessel occlusion or hemodynamic stenosis Multilevel degenerate changes cervical spine. Clinical Course : Unstable. Assessment and Plan  :        1. Acute metabolic encephalopathy -secondary to pituitary insufficiency -hydrocortisone, levothyroxine IV  2. Circulatory shock-continue Levophed, crystalloid fluid resuscitation. 3. Acute respiratory failure -intubated. Critical care for vent management. Empiric Zosyn for possible aspiration. 4. Acute kidney injury secondary to prerenal ischemic ATN. Continue fluid resuscitation. 5. Anion gap metabolic acidosis secondary circulatory shock. Bicarb infusion.   6. Shock likely secondary to pituitary insufficiency-continue Levophed, crystalloids. 7. Acute LOC -check echocardiogram.  8. Secondary hypothyroidism -IV levothyroxine  9. COVID -19 infection -  10. H/O pituitary tumour s/p resection  -10/2016 -   11. Type 2 diabetes mellitus-check A1c. Humalog as needed    DVT prophylaxis  GI prophylaxis    I called patient's listed emergency contact patient's daughter Lisandro Curran and significant other Emily Irvin and updated them about todays events. Patient has 13 children with 3-4 separate women. He currently lives with Ms. Kelvin Sunshine. Patient has living mother Ms. Joy Jauregui (phone number [phone])   Poor prognosis. Continue to monitor vitals , Intake / output ,  Cell count , HGB , Kidney function, oxygenation  as indicated . Plan and updates discussed with patient ,  answers  explained to satisfaction.    Plan discussed with Staff Ester Gold RN     (Please note that portions of this note were completed with a voice recognition program. Efforts were made to edit the dictations but occasionally words are mis-transcribed.)      Maryellen Bean MD  7/29/2020

## 2020-07-29 NOTE — PROGRESS NOTES
1340 CPR started. 1mg of Epi given at 1342. No pulse at 1342. CPR continued. 1344 ROSC achieved. /97, HR 71.

## 2020-07-29 NOTE — PROCEDURES
Central Venous Cannulation Procedure Note    Indication   [X] Venous access   [] Monitor CVP   [] Other    Consent   [X] Performed emergently   [] Details of the procedure were explained in detail including risks and benefits. An opportunity was provided to ask questions. Consent was obtained from the following person(s):    [] Patient   [] Sibling    [] Spouse   [] Parent    [] Child   [] Guardian    Documentation Review     [X] Patient admission history and physical examination as well as interval hospital course documentation reviewed prior to initiating procedure and/or procedural sedation    Time out     [X] The process of patient care was interrupted for all participants to confirm the correct patient, correct procedure, correct site and the availability of appropriate equipment. Please see nursing documentation for those present. Quick Look     [X] Prior to initiating procedural sedation a \"quick look\" revealed pulse oximetry and vital signs appropriate to proceed    Side/Site   [] Left              [X] Internal jugular vein   [X] Right             [] Subclavian vein        [] Infraclavicular approach        [] Supraclavicular approach       [] Femoral vein     [X] New stick   [] Rewire    Catheter   [X] Arrow Triple Lumen   [] Cook antibiotic-coated triple lumen   [] 8.5 Mongolian Introducer   [] FIDELIA Trauma Catheter   [] Other    Technique   [X} Aseptic preparation with ChloraPrep   [X] Operators in hat, mask, sterile gown & gloves after hand wash   [X] Large sterile table drape   [X] Local anesthetic with lidocaine   [X] Ultrasound     [X] Localization    [X] Guidance (real time)     [X] Confirmation of intravascular (venous) position of guidewire   [X] Seldinger technique   [ ] Manometry to confirm venous placement   [X] Biopatch    [X] Tegasorb dressing per protocol     Chest X-ray   [] Pending   [X] Reviewed.       [X] Catheter in the correct position    [X] No pneumothorax.     Complications   [X] No immediate complications    Attestation   [X] I was present for the entire procedure    Kaiser Palacio MD  Pulmonary & Critical Care Medicine  07/29/20

## 2020-07-29 NOTE — PLAN OF CARE
Problem: Airway Clearance - Ineffective  Goal: Achieve or maintain patent airway  7/29/2020 1611 by Damaris Mercer RCP  Outcome: Ongoing     Problem: Gas Exchange - Impaired  Goal: Absence of hypoxia  7/29/2020 1611 by Damaris Mercer RCP  Outcome: Ongoing  Goal: Promote optimal lung function  7/29/2020 1611 by Damaris Mercer RCP  Outcome: Ongoing     Problem: Breathing Pattern - Ineffective  Goal: Ability to achieve and maintain a regular respiratory rate  7/29/2020 1611 by Damaris Mercer RCP  Outcome: Ongoing     Problem: ACTIVITY INTOLERANCE/IMPAIRED MOBILITY  Goal: Mobility/activity is maintained at optimum level for patient  7/29/2020 1611 by Damaris Mercer RCP  Outcome: Ongoing     Problem: COMMUNICATION IMPAIRMENT  Goal: Ability to express needs and understand communication  7/29/2020 1611 by Damaris Mercer RCP  Outcome: Ongoing     Problem: OXYGENATION/RESPIRATORY FUNCTION  Goal: Patient will maintain patent airway  7/29/2020 1611 by Damaris Mercer RCP  Outcome: Ongoing  Goal: Patient will achieve/maintain normal respiratory rate/effort  Description: Respiratory rate and effort will be within normal limits for the patient  7/29/2020 1611 by Damaris Mercer RCP  Outcome: Ongoing     Problem: MECHANICAL VENTILATION  Goal: Mobility/activity is maintained at optimum level for patient  7/29/2020 1611 by Damaris Mercer RCP  Outcome: Ongoing  Goal: Ability to express needs and understand communication  7/29/2020 1611 by Damaris Mercer RCP  Outcome: Ongoing  Goal: Patient will maintain patent airway  7/29/2020 1611 by Damaris Mercer RCP  Outcome: Ongoing  Goal: Patient will maintain patent airway  7/29/2020 1611 by Damaris Mercer RCP  Outcome: Ongoing  Goal: Oral health is maintained or improved  7/29/2020 1611 by Damaris Mercer RCP  Outcome: Ongoing  Goal: ET tube will be managed safely  7/29/2020 1611 by Damaris Mercer RCP  Outcome: Ongoing     Problem: SKIN INTEGRITY  Goal: Skin integrity is maintained or improved  7/29/2020 1611 by Delonte Guerrero RCP  Outcome: Ongoing

## 2020-07-29 NOTE — ANESTHESIA PROCEDURE NOTES
Arterial Line:    An arterial line was placed using ultrasound guidance, in the ICU for the following indication(s): continuous blood pressure monitoring and blood sampling needed. A 20 gauge (size), 12 cm (length), Arrow (type) catheter was placed, Seldinger technique used, into the right brachial artery, secured by suture. Events:  patient tolerated procedure well with no complications.   Resident/CRNA: DARREN Hernandez CRNA  Performed: Resident/CRNA   Preanesthetic Checklist  Completed: patient identified, site marked, monitors and equipment checked and patient being monitored

## 2020-07-29 NOTE — PROCEDURES
LONG-TERM EEG-VIDEO [id number] [address]    Patient: Fidelia Sanabria  Age: 60 y.o. MRN: [id number]    Referring Physician: No ref. provider found  History: The patient is a 60 y.o. male who presented breakthrough seizure/encephalopathy. This long-term video-EEG monitoring study was performed to determine the nature of the patient's clinical events. The patient is on neuroactive medications.    Fidelia Sanabria   Current Facility-Administered Medications   Medication Dose Route Frequency Provider Last Rate Last Dose    0.9 % sodium chloride infusion   Intravenous Continuous DARREN Moss -  mL/hr at 07/28/20 1630      sodium chloride flush 0.9 % injection 10 mL  10 mL Intravenous 2 times per day DARREN Dodd - CNP        sodium chloride flush 0.9 % injection 10 mL  10 mL Intravenous PRN Sejal Scott APRN - CNP        acetaminophen (TYLENOL) tablet 650 mg  650 mg Oral Q6H PRN DARREN Dodd - CNP        Or    acetaminophen (TYLENOL) suppository 650 mg  650 mg Rectal Q6H PRN Sejal Scott APRN - CNP        polyethylene glycol (GLYCOLAX) packet 17 g  17 g Oral Daily PRN Sejal Scott APRN - CNP        promethazine (PHENERGAN) tablet 12.5 mg  12.5 mg Oral Q6H PRN DARREN Dodd - CNP        Or    ondansetron (ZOFRAN) injection 4 mg  4 mg Intravenous Q6H PRN DARREN Dodd - CNP        nicotine (NICODERM CQ) 21 MG/24HR 1 patch  1 patch Transdermal Daily PRN Sejal Scott APRN - CNP        enoxaparin (LOVENOX) injection 30 mg  30 mg Subcutaneous BID Sejal Scott APRN - CNP   30 mg at 07/28/20 2023    LORazepam (ATIVAN) injection 2 mg  2 mg Intravenous Q4H PRN Sejal Scott APRN - CNP        diphenhydrAMINE (BENADRYL) injection 25 mg  25 mg Intravenous Q6H PRN Venus Ruiz APRN - CNP   25 mg at 07/28/20 0928    aspirin EC tablet 81 mg  81 mg Oral Daily Fercho Morrow MD        atorvastatin (LIPITOR) tablet 40 mg  40 mg Oral Nightly Fercho Morrow MD        levetiracetam (KEPPRA) 500 mg/100 mL IVPB  500 mg Intravenous Q12H Fercho Morrow MD   Stopped at 07/28/20 2121    glucose (GLUTOSE) 40 % oral gel 15 g  15 g Oral PRN Hasmukh White MD        dextrose 50 % IV solution  12.5 g Intravenous PRN Hasmukh White MD        glucagon (rDNA) injection 1 mg  1 mg Intramuscular PRN Hasmukh White MD        dextrose 5 % solution  100 mL/hr Intravenous PRN Hasmukh White MD        insulin lispro (HUMALOG) injection vial 0-6 Units  0-6 Units Subcutaneous TID WC Hasmukh White MD        insulin lispro (HUMALOG) injection vial 0-3 Units  0-3 Units Subcutaneous Nightly Hasmukh White MD        metoprolol (LOPRESSOR) injection 2.5 mg  2.5 mg Intravenous Q6H PRN Hasmukh White MD   2.5 mg at 07/28/20 1830     Technical Description: This is a 21-channel digital EEG recording with time-locked video. Electrodes were placed in accordance with the 10-20 International System of Electrode Placement. Single lead EKG monitoring was included. Baseline EEG Recording:  A formal baseline EEG recording was not obtained. Day 1 - 7/28/20, starting at 17:40    Interictal EEG Samples: The background activity consisted of 4 to 5 Hz of 15-30 µV. There was poor anterior posterior amplitude gradient. During the recording, near continuous frontally dominant 80 to 120 µV 2-2.5 Hz rhythmic delta activity was seen which waxed and waned and at times appeared to be on ictal/interictal spectrum. At times, these waves have apiculate appearing triphasic morphology. The EKG channel revealed no abnormalities. Ictal EEG Recording / Patient Events: During this period the patient had near continuous frontally dominant rhythmic delta activity at  2-2.5 Hz which appeared to be on ictal interictal spectrum.     Summary: During this day

## 2020-07-29 NOTE — CONSULTS
PULMONARY & CRITICAL CARE MEDICINE CONSULT NOTE     Patient:  Sherryle Amour  MRN: [id number]  Admit date: 7/27/2020  Primary Care Physician: No primary care provider on file. Consulting Physician: Les Lucio MD  CODE Status: Full Code     HISTORY     CHIEF COMPLAINT/REASON FOR CONSULT: Acute respiratory failure/shock    HISTORY OF PRESENT ILLNESS:  The patient is a 60 y.o. male with past medical history of pituitary tumor, status post resection 10/2016. He was found down by his coworkers and brought to the emergency room for altered mental status. Patient was confused and lethargic. No previous history of seizures or witnessed seizures. U was reportedly working in a [employer] without air conditioning and his temperature was 102 per EMS. He did have loss of bowel and bladder control. He was admitted to the [facility] service and was evaluated by neurology. His lab evaluation showed low cortisol and TSH levels. His co-test was positive. CT chest was negative for PE or any parenchymal involvement. He has been having copious diarrhea and had a rectal tube in place. Patient had an acute deterioration in his clinical status this afternoon after he had PEA cardiac arrest.  ROSC was obtained after 2 rounds of CPR. He was intubated, and received 1 dose of due to A. fib with RVR, after which he converted to sinus rhythm. Patient underwent placement of right IJ central venous catheter and was subsequently transferred to ICU. Upon arrival in the ICU he coded again twice and required epinephrine and CPR before return of spontaneous circulation. He is currently on norepinephrine and epinephrine infusion. He also received sodium bicarbonate and calcium chloride. His BMP showed glucose of 11, hyperkalemia and severe metabolic acidosis. He received 2 dextrose boluses and was started on bicarbonate infusion. Patient remained comatose after the code and has not required any sedation.   His pupils are midsize, respond to any painful stimuli  Eyes - pupils mid dilated and nonreactive, sclera anicteric  Mouth - mucous membranes moist, pharynx normal without lesions  Neck - supple, no significant adenopathy, carotids upstroke normal bilaterally, no bruits  Chest - Breath sounds bilaterally were diminished to auscultation at bases.    Heart - normal rate, regular rhythm, normal S1, S2, no murmurs, rubs, clicks or gallops  Abdomen - soft, nontender, nondistended, no masses or organomegaly  Neurological - DTR's normal and symmetric, motor and sensory grossly normal bilaterally  Extremities - peripheral pulses normal, no pedal edema, no clubbing or cyanosis  Skin - normal coloration and turgor, no rashes, no suspicious skin lesions noted     DATA REVIEW     Medications: Current Inpatient  Scheduled Meds:   famotidine  20 mg Oral BID    hydrocortisone sodium succinate PF  50 mg Intravenous Q6H    piperacillin-tazobactam  3.375 g Intravenous Q8H    levothyroxine  50 mcg Intravenous Daily    And    sodium chloride (PF)  5 mL Intravenous Daily    sodium chloride flush  10 mL Intravenous 2 times per day    sodium chloride flush  10 mL Intravenous 2 times per day    enoxaparin  30 mg Subcutaneous BID    aspirin  81 mg Oral Daily    atorvastatin  40 mg Oral Nightly    insulin lispro  0-6 Units Subcutaneous TID WC    insulin lispro  0-3 Units Subcutaneous Nightly    levetiracetam  1,000 mg Intravenous Q12H     Continuous Infusions:   sodium bicarbonate infusion      norepinephrine 30 mcg/min (07/29/20 1513)    EPINEPHrine infusion 15 mcg/min (07/29/20 1512)    sodium chloride 125 mL/hr at 07/29/20 1000    dextrose       INPUT/OUTPUT:  In: -   Out: 400 [Urine:400]  Date 07/29/20 0000 - 07/29/20 2359   Shift 1505-2180 1182-6343 7215-9888 24 Hour Total   INTAKE   Shift Total(mL/kg)       OUTPUT   Urine(mL/kg/hr)  400(0.6)  400   Shift Total(mL/kg)  400(5.2)  400(5.2)   Weight (kg) 77.1 77.1 77.1 77.1       LABS:-  ABG: Recent Labs     07/29/20  1409 07/29/20  1456   POCPH 7.138* 7.073*   POCPCO2 33.9* 38.1   POCPO2 92.0 427.4*   POCHCO3 11.5* 11.1*   YRSJ0MDZ 94 100*     CBC:   Recent Labs     07/27/20 2057 07/28/20  0807 07/29/20  1324   WBC 5.3 6.3 10.5   HGB 14.5 15.6 13.8   HCT 43.8 47.1 46.5   MCV 85.2 85.6 93.4    232 208   LYMPHOPCT 35  --  61*   RBC 5.14 5.50 4.98   MCH 28.2 28.4 27.7   MCHC 33.1 33.1 29.7   RDW 14.2 14.4 14.9*     BMP:   Recent Labs     07/28/20  0807 07/29/20  1324 07/29/20  1409 07/29/20  1456 07/29/20  1522    150*  --   --  149*   K 3.7 6.4*  --   --  3.5*    117*  --   --  118*   CO2 14* 7*  --   --  8*   BUN 13 29*  --   --  28*   CREATININE 1.24* 3.22* 4.33* 3.83* 3.26*   GLUCOSE 70 11*  --   --  285*     Liver Function Test:   Recent Labs     07/28/20  0807   PROT 8.1   LABALBU 4.2   ALT 27   AST 44*   ALKPHOS 126   BILITOT 0.56     Amylase/Lipase:  No results for input(s): AMYLASE, LIPASE in the last 72 hours. Coagulation Profile:   Recent Labs     07/27/20 2057 07/28/20  0807 07/29/20  0312   INR 1.1 1.0  --    PROTIME 11.1 10.9  --    APTT 28.8 34.6* 37.7*     Cardiac Enzymes:  No results for input(s): CKTOTAL, CKMB, CKMBINDEX, TROPONINI in the last 72 hours. Lactic Acid:  No results found for: LACTA  BNP:   No results found for: BNP  D-Dimer:  Lab Results   Component Value Date    DDIMER 0.71 07/29/2020     Others:   Lab Results   Component Value Date    TSH <0.01 (L) 07/28/2020     No results found for: JOEL, RHEUMFACTOR, SEDRATE, CRP  No results found for: Codi Brian  Lab Results   Component Value Date    FERRITIN 471 (H) 07/28/2020     No results found for: SPEP, UPEP  No results found for: PSA, CEA, , SX2443,   Microbiology:    Pathology:    Radiology Reports:  XR CHEST PORTABLE   Final Result   Right IJ central line appears in satisfactory position without evidence of   pneumothorax. Endotracheal tube also appears in satisfactory position. CT CHEST PULMONARY EMBOLISM W CONTRAST   Final Result   No evidence of pulmonary embolism or acute pulmonary abnormality. CTA HEAD NECK W CONTRAST   Final Result   No large vessel occlusion or hemodynamic stenosis      Multilevel degenerate changes cervical spine. CT HEAD WO CONTRAST   Final Result   No acute intracranial abnormality. Findings were communicated electronically to Dr. Maylin López through the radiology   results communication center. MRI LIMITED BRAIN    (Results Pending)     Echocardiogram:   No results found for this or any previous visit. Cardiac Catheterization:   No results found for this or any previous visit. ASSESSMENT AND PLAN     Assessment:    // Resuscitated cardiorespiratory arrest  // Return of spontaneous occlusion  // Circulatory shock  // Acute kidney injury  // Acute acute metabolic acidosis  // Hypoglycemia  // Hyperkalemia  // Acute encephalopathy, toxic/metabolic versus anoxic  // Hypopituitarism; low TSH, low cortisol, gynecomastia  // History of pituitary tumor, status post resection in 2016  // Hyperpyrexia  // Suspected seizure disorder  // COVID-19 positive  // Diabetes mellitus    Plan:    I personally interviewed/examined the patient; reviewed interval history, interpreted all available radiographic and laboratory data at the time of service.      Patient does not respond to any painful or verbal stimuli, eyes pupils mid dilated and nonreactive, he is currently not requiring any sedation   Patient is currently on norepinephrine and epinephrine infusions epinephrine as tolerated   Patient was emergently intubated and started on [facility] mechanical ventilation   Continue lung protective mechanical ventilation, appropriate changes made in ventilator settings   Continue pulmonary toilet, aspiration precautions and bronchodilators    Continue to monitor I/O with a goal of even fluid balance   Continue bicarbonate infusion   Keep OG tube to low

## 2020-07-29 NOTE — PLAN OF CARE
PROVIDE ADEQUATE OXYGENATION WITH ACCEPTABLE SP02/ABG'S    [x]  IDENTIFY APPROPRIATE OXYGEN THERAPY  [x]   MONITOR SP02/ABG'S AS NEEDED   [x]   PATIENT EDUCATION AS NEEDED    MECHANICAL VENTILATION     [x]   PROVIDE OPTIMAL VENTILATION  [x]   ASSESS FOR EXTUBATION READINESS  [x]   ASSESS FOR WEANING READINESS  [x]  EXTUBATE AS TOLERATED  [x]  IMPLEMENT ADULT MECHANICAL VENTILATION PROTOCOL  [x]  MAINTAIN ADEQUATE OXYGENATION  [x]  PERFORM SPONTANEOUS WEANING TRIAL AS TOLERATED    The tube was secured with an endotracheal tube vee. The endotracheal tube was moved and the skin assessed. Skin assessment revealed no problems. Endotracheal tube was taped at the  24 cm drea. Oral gastric tube was not retaped to the endotracheal tube. Endotracheal tube vee was applied on July 29. Action steps for any skin breakdown: hai Vasquez  4:06 PM  ETT measures 6 cm from lip to hub. Height 70 Inches 73 kg Ideal body weight  6 ml/kg= 438 ml  7 ml/kg= 511 ml  8 ml/kg= 584 ml  Jules placed/changed 7/29/2020  Morris placed/changed 7/29/2020    The transport originated from [facility]. Pt. was [facility] to [facility]. Assisting with the transport was Sandra Siddiqui RN, Vane Rocha, Francisco Andino RN, and REYES Sher. Appropriate devices were applied to monitor the patient's condition during transport. Patient transported  via 100% O2 via Bag/Valve Mask. Patient tolerated well.         Rema Vasquez  4:07 PM     Assessment for weaning readiness    PRE-TRIAL PATIENT ASSESSMENT - COMPLETED AT 1400    Completed by:  Rema Vasquez  4:07 PM    PARAMETER CRITERIA FOR WEANING CRITERIA FOR WEANING   Diaphoresis, agitation or dyspnea None present No   Heart Rate    Pulse: 99 Pulse less than 50 or greater than 140   No   Blood Pressure  BP: 333/88 Systolic Blood Pressure less than 90 mmHg No   Vaspressors Vasopressors greater than or equal to 5mcg No   SpO2: 100 %  FiO2 : (S) 80 %     SpO2 less than 90% and or   FiO2 is greater than

## 2020-07-29 NOTE — PROGRESS NOTES
Writer responded to Rapid Assessment Team Response to pt on 2004. Writer arrived to pt not breathing on own, no heart beat, CPR started. Dr. Elodia Garrison intubated with 7.5 ETT w/o incident. ETT secured, bilateral breath sounds, color change on EtCO2, condensation in ETT, increase in O2 sats, CXR pending at that time. Writer remained at bedside for duration and for transfer to [facility] ICU. Writer continued pt care in room 3007.

## 2020-07-29 NOTE — PROGRESS NOTES
[facility]  Progress Note    Christopher Munson  MRN: [id number]  Date: 7/29/2020    Reason for F/U :   COVID-19 virus infection    Impression :   1. Cardiopulmonary arrest  2. Acute hypoxic respiratory failure currently ventilator dependent  3. Shock currently on pressors for blood pressure support  4. Suspected new onset seizure  5. COVID-19 virus infection without evidence of pneumonia  6. History of pituitary tumor status post resection 2016  7. Hypothyroidism  8. Diabetes mellitus type 2  9. Diarrhea    Recommendations:   · The patient had repeat blood cultures x2 sent given his fever. · Cultures so far remain negative. · The patient was started on empiric antimicrobial therapy with Zosyn though it is not clear that there is a true infectious course at this time given CT imaging yesterday did not show any pneumonia  · Neurological work-up continues  · Consider cardiac evaluation    Infection Control Recommendations:   COVID-19 virus infection    Discharge Planning:   Estimated Length of IV antimicrobials: To be determined  Patient will need Midline Catheter Insertion/ PICC line Insertion: No  Patient will need: Home IV , [place],  SNF,  LTAC: Undetermined  Patient willneed outpatient wound care: No    MedicalDecision making / Summary of Stay:   Christopher Munson is a [address]year-old male who was initially admitted on 7/27/2020. Zachary Woods is seen and evaluated at bedside and the chart was reviewed. He is a poor historian and I was not able to get any history from him. 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 He was last known to be well at about 2 PM when a work colleague checked in on him but he was subsequently found down unresponsive for an unknown period of time. No witnessed seizures. EMS was called and temperature was 102 them on arrival in the emergency department was 100.7. The patient also lost control of bowel and bladder and was noted to have tongue biting.   COVID-19 virus testing was positive and the patient was admitted to the [facility] unit. The patient does have a history of a pituitary tumor which was resected 2 years ago. The patient is suspected to have had a seizure and I was asked to evaluate for the COVID-19 infection. The patient does not recall the events of what happened and does not able to get much out of him today. The EEG showed diffuse polymorphic delta theta slowing suggestive of moderate to severe encephalopathy and frontal dominant rhythm delta activity concerning for subclinical seizure. Current evaluation:2020    /82   Pulse 104   Temp 99.4 °F (37.4 °C) (Axillary)   Resp 28   Ht 5' 10\" (1.778 m)   Wt 170 lb (77.1 kg)   SpO2 100%   BMI 24.39 kg/m²     Temperature Range: Temp: 99.4 °F (37.4 °C) Temp  Av.4 °F (38.6 °C)  Min: 99.4 °F (37.4 °C)  Max: 102.7 °F (39.3 °C)  The patient is seen and evaluated at bedside and the chart was reviewed. The patient had clinical deterioration with a PEA arrest and after successful resuscitation he had subsequent PEA and asystolic arrest requiring ACLS protocol. The patient was intubated started on Levophed for blood pressure support and was transferred to the intensive care unit where I am seeing him currently. He is orally intubated on the ventilator and on some sedation as well as on pressors. Review of Systems   Unable to perform ROS: Intubated       Physical Examination :     Physical Exam  Constitutional:       Appearance: He is well-developed. Interventions: He is sedated and intubated. HENT:      Head: Normocephalic and atraumatic. Neck:      Musculoskeletal: Normal range of motion and neck supple. Cardiovascular:      Rate and Rhythm: Normal rate. Heart sounds: Normal heart sounds. Pulmonary:      Effort: Pulmonary effort is normal. He is intubated. Breath sounds: Normal breath sounds. Abdominal:      General: Bowel sounds are normal. There is distension. Palpations: Abdomen is soft. Lymphadenopathy:      Cervical: Cervical adenopathy present. Skin:     General: Skin is warm and dry. Laboratory data:   I have independently reviewed the followinglabs:  CBC with Differential:   Recent Labs     07/27/20 2057 07/28/20  0807 07/29/20  1324   WBC 5.3 6.3 10.5   HGB 14.5 15.6 13.8   HCT 43.8 47.1 46.5    232 208   LYMPHOPCT 35  --  61*   MONOPCT 16*  --  7     BMP:   Recent Labs     07/29/20  1324  07/29/20  1456 07/29/20  1522   *  --   --  149*   K 6.4*  --   --  3.5*   *  --   --  118*   CO2 7*  --   --  8*   BUN 29*  --   --  28*   CREATININE 3.22*   < > 3.83* 3.26*   MG  --   --   --  2.3    < > = values in this interval not displayed. Hepatic Function Panel:   Recent Labs     07/27/20 2057 07/28/20  0807   PROT 7.3 8.1   LABALBU 3.8 4.2   BILITOT 0.76 0.56   ALKPHOS 117 126   ALT 18 27   AST 30 44*     No results for input(s): VANCOTROUGH in the last 72 hours. No results found for: CRP  No results found for: SEDRATE    Recent Labs     07/27/20 2057   PROCAL 0.19*     Lactic Acid, Whole Blood  10. 3High    0.7 - 2.1 mmol/L  Final  07/29/2020  1:24 PM  [address]        Imaging Studies:   MRI of the brain is pending    Cultures:     C DIFF TOXIN/ANTIGEN [[id number]]   Collected: 07/28/20 1220    Order Status: Completed  Specimen: Stool  Updated: 07/29/20 0850     Specimen Description  . FECES     C DIFF AG + TOXIN  NEGATIVE     Comment:  No C. difficile antigen and Toxin Detected. Culture, Blood 1 [[id number]]   Collected: 07/27/20 2028    Order Status: Completed  Specimen: Blood  Updated: 07/29/20 0025     Specimen Description  . BLOOD     Special Requests  NOT REPORTED     Culture  NO GROWTH 2 DAYS    Culture, Blood 1 [[id number]]   Collected: 07/27/20 2030    Order Status: Completed  Specimen: Blood  Updated: 07/29/20 0025     Specimen Description  . BLOOD     Special Requests  NOT REPORTED     Culture  NO GROWTH 2 DAYS    Culture, Urine [[id number]]   Collected: 07/27/20 2154    Order Status: Completed  Specimen: Urine, clean catch  Updated: 07/28/20 2112     Specimen Description  . CLEAN CATCH URINE     Special Requests  NOT REPORTED     Culture  NO SIGNIFICANT GROWTH    Gastrointestinal Panel, Molecular [[id number]]   Collected: 07/28/20 1220    Order Status: Sent  Specimen: Stool  Updated: 07/28/20 1220         Medications:      famotidine  20 mg Oral BID    hydrocortisone sodium succinate PF  50 mg Intravenous Q6H    piperacillin-tazobactam  3.375 g Intravenous Q8H    levothyroxine  50 mcg Intravenous Daily    And    sodium chloride (PF)  5 mL Intravenous Daily    sodium chloride flush  10 mL Intravenous 2 times per day    sodium chloride  30 mL/kg Intravenous Once    sodium chloride flush  10 mL Intravenous 2 times per day    enoxaparin  30 mg Subcutaneous BID    aspirin  81 mg Oral Daily    atorvastatin  40 mg Oral Nightly    insulin lispro  0-6 Units Subcutaneous TID WC    insulin lispro  0-3 Units Subcutaneous Nightly    levetiracetam  1,000 mg Intravenous Q12H           [facility]  Vaishali Peck MD  [employer] messaging  OFFICE: [phone]      Electronically signed by Vaishali Peck MD on 7/29/2020 at 5:53 PM  Thank you for allowing us to participate in the care of this patient. Please call with questions. This note iscreated with the assistance of a speech recognition program.  While intending to generate a document that actually reflects the content of the visit, the document can still have some errors including those of syntax andsound a like substitutions which may escape proof reading. In such instances, actual meaning can be extrapolated by contextual diversion.

## 2020-07-29 NOTE — PROGRESS NOTES
Patient arrived on floor at 1325. Immediately hooked up to monitor. CPR initiated at 1325. 1 mg of Epi was given at 1327. ROSC achieved at 1328.  BP-142/65, HR-89, NSR.

## 2020-07-29 NOTE — FLOWSHEET NOTE
Chaplain Vlad Intern, on 7/29/2020 at 1:51 PM.  [facility]  [phone]

## 2020-07-29 NOTE — PROGRESS NOTES
Occupational Therapy    Occupational Therapy Not Seen Note    DATE: 2020  Name: Zoë Urbano  : 1960  MRN: [id number]    Patient not available for Occupational Therapy due to: Other: Pt restrained, on LTME, and tachy at rest-140's. Hold OT eval.    Next Scheduled Treatment: Attempt on  as appropriate.     Electronically signed by Angel Silvestre OT on 2020 at 9:16 AM

## 2020-07-29 NOTE — PROCEDURES
Endotracheal IntubationVisit Code (CPT): 53650    Indication  [X] Respiratory failure     [] Airway protection      Consents    [X] Performed emergently  [] Details of the procedure were explained in detail which included risks and benefits. An opportunity was provided to ask questions. Consent was obtained from the following person(s):  [] Patient    [] Parent  [] Spouse    [] Guardian  [] Child       Documentation Review  [X] Patient admission history and physical examination as well as interval hospital course documentation reviewed prior to initiating procedure and/or procedural sedation    Time out  [X] The process of patient care was interrupted for all participants to confirm the correct patient, correct procedure, correct site and the availability of appropriate equipment. Please see nursing documentation for those present. Quick Look  [X] Prior to initiating procedural sedation a \"quick look\" revealed pulse oximetry and vital signs appropriate to proceed    Sedation and Paralytics (see MAR for doses and administration times)  None    Preparation  [X] Preoxygenated with 100% oxygen prior to intubation with mask and Ambu bag   [X] Oral suction  [X] Position neutral, extended in line      Monitoring  [X] Blood pressure monitoring, cardiac monitor, pulse oximetry. Technique  [] Direct laryngoscope                Blade [] Skyler Parsons [] #3    [] #4                                 []  Figueroa Poles  [X] Video laryngoscope                 Glidescope Blade [] #3   [X] #4     [] Bronchoscope  [] Bougie    Endotracheal tube (ET) size   [] 8.5  [] 8.0  [X] 7.5  [] 7.0  [] 6.5    ET secured at 24 cm at lip     Confirmation of position         [X] with auscultation of bilateral breath sounds,         [X] with capnometry         [X] with chest x-ray       Findings     [X] bilateral breath sounds on auscultation         [X] cords visualized without difficulty        [X] normal airway.        Procedure tolerated       [X] well      [] fairly      [] poorly      [] No immediate complications   [] Desaturation  [] Cardiac arrhythmia    [] Bleeding     Chest Radiograph     [] Pending       [X] Reviewed                          [X] No pneumothorax                          [X] Good tube position    Follow-up Interventions   [] Tube position adjusted. Comment  [X] I was present for the entire procedure.     Hayden Miles MD  07/29/20

## 2020-07-29 NOTE — PLAN OF CARE
Problem: COMMUNICATION IMPAIRMENT  Goal: Ability to express needs and understand communication  7/29/2020 1957 by Anastasia Bahena RCP  Outcome: Ongoing     Problem: OXYGENATION/RESPIRATORY FUNCTION  Goal: Patient will maintain patent airway  7/29/2020 1957 by Anastasia Bahena RCP  Outcome: Ongoing     Problem: OXYGENATION/RESPIRATORY FUNCTION  Goal: Patient will achieve/maintain normal respiratory rate/effort  Description: Respiratory rate and effort will be within normal limits for the patient  7/29/2020 1957 by Anastasia Bahena RCP  Outcome: Ongoing     Problem: MECHANICAL VENTILATION  Goal: Mobility/activity is maintained at optimum level for patient  7/29/2020 1957 by Anastasia Bahena RCP  Outcome: Ongoing     Problem: MECHANICAL VENTILATION  Goal: Ability to express needs and understand communication  7/29/2020 1957 by Anastasia Bahena RCP  Outcome: Ongoing     Problem: MECHANICAL VENTILATION  Goal: Patient will maintain patent airway  7/29/2020 1957 by Anastasia Bahena RCP  Outcome: Ongoing     Problem: MECHANICAL VENTILATION  Goal: Patient will maintain patent airway  7/29/2020 1957 by Anastasia Bahena RCP  Outcome: Ongoing     Problem: MECHANICAL VENTILATION  Goal: Oral health is maintained or improved  7/29/2020 1957 by Anastasia Bahena RCP  Outcome: Ongoing     Problem: MECHANICAL VENTILATION  Goal: ET tube will be managed safely  7/29/2020 1957 by Anastasia Bahena RCP  Outcome: Ongoing     Problem: SKIN INTEGRITY  Goal: Skin integrity is maintained or improved  7/29/2020 1957 by Anastasia Bahena RCP  Outcome: Ongoing

## 2020-07-29 NOTE — SIGNIFICANT EVENT
ctsp re: confusion, pulled out iv    Currently getting eeg, very restless in bed. Has already pulled out 1 iv and only has 1 left.   Will order restraints for safety    Julio Cesar Share Blood, DO

## 2020-07-29 NOTE — PROGRESS NOTES
1205 - In room for patient reassessment. BP 62/53. Rechecked several times on each arm and left lower leg. Dr. Bari Goldstein notified of low BP via perfectserve. IV fluids to 999. Patient pupils fixed. Not responding to name. 1210 - requested crash cart to room anticipating use. 1224 - Pulse dropping, unable to palpate pulse. BP remains low. ICU team arrived to room and Dr. Kaley Motta. CPR started. 1226 - Epi given, no pulse palpable or by doppler, CPR resumed. 1228 - Pulse check, no pulse found, CPR resumed. 1227 - Patient intubated x 1 attempt. 7.5 24 cm at the lip. 1229 - Epi given, no pulse found, CPR continues  1231 - Pulse check, patient does have pulse,  EKG completed. A Fib with RVR. 1233 - 150 mg Amiodarone given. 1241 - /82 P 76  1253 - Dr. Kaley Motta inserting central line. 7 Trin[place]. Labs drawn. 1307 - Patient with no pulse. CPR started. BP 56/26  1309 - No pulse found, CPR continued  1310 - Epi given  1311 - ROSC achieved, ST /95  1314 - Rhythm appears NSR /67  1322 - BP 45/31 Levo drip started. Pulse weak but obtained by doppler. Patient transferred to ICU.

## 2020-07-29 NOTE — PROGRESS NOTES
Physical Therapy  DATE: 2020    NAME: Neha Silva  MRN: [id number]   : 1960    Patient not seen this date for Physical Therapy due to:  [] Blood transfusion in progress  [] Hemodialysis  []  Patient Declined  [] Spine Precautions   [] Strict Bedrest  [] Surgery/ Procedure  [] Testing      [x] Other--pt is tachycardic--'s at rest; ck 20         [] PT being discontinued at this time. Patient independent. No further needs. [] PT being discontinued at this time as the patient has been transferred to palliative care. No further needs.     Janee Letters, PT

## 2020-07-29 NOTE — PLAN OF CARE
Problem: Airway Clearance - Ineffective  Goal: Achieve or maintain patent airway  Outcome: Ongoing     Problem: Gas Exchange - Impaired  Goal: Absence of hypoxia  Outcome: Ongoing  Goal: Promote optimal lung function  Outcome: Ongoing     Problem: Breathing Pattern - Ineffective  Goal: Ability to achieve and maintain a regular respiratory rate  Outcome: Ongoing     Problem: OXYGENATION/RESPIRATORY FUNCTION  Goal: Patient will maintain patent airway  Outcome: Ongoing  Goal: Patient will achieve/maintain normal respiratory rate/effort  Description: Respiratory rate and effort will be within normal limits for the patient  Outcome: Ongoing     Problem: MECHANICAL VENTILATION  Goal: Mobility/activity is maintained at optimum level for patient  Outcome: Ongoing  Goal: Ability to express needs and understand communication  Outcome: Ongoing  Goal: Patient will maintain patent airway  Outcome: Ongoing  Goal: Oral health is maintained or improved  Outcome: Ongoing  Goal: ET tube will be managed safely  Outcome: Ongoing     Problem: SKIN INTEGRITY  Goal: Skin integrity is maintained or improved  Outcome: Ongoing

## 2020-07-30 NOTE — PROGRESS NOTES
[facility]  Occupational Therapy Not Seen Note    DATE: 2020  Name: Zoë Urbano  : 1960  MRN: [id number]    Patient not available for Occupational Therapy due to:    [] Testing:    [] Hemodialysis    [] Blood Transfusion in Progress    []Refusal by Patient:    [] Surgery/Procedure:    [] Strict Bedrest    [x] Sedation/intubated: Versed     [] Spine Precautions     [] Pt being transferred to palliative care at this time. Spoke with pt/family and OT services to be defered. [] Pt independent with functional mobility and functional tasks.  Pt with no OT acute care needs at this time, will defer OT eval.    [] Other    Next Scheduled Treatment: Ck      GUSTAVO Javier/L

## 2020-07-30 NOTE — CONSULTS
Renal Consult Note    Patient :  Maribel Sandoval; 60 y.o. MRN# [id number]  Location:  3033/4523-29  Attending:  Sneha Hendricks MD  Admit Date:  7/27/2020   Hospital Day: 3    Reason for Consult:     Asked by Dr Sneha Hendricks MD to see for LEILA/Elevated Creatinine. History Obtained From:     non-family caregiver - nursing staff, electronic medical record    History of Present Illness:     Maribel Sandoval; 60 y.o. male with past medical history as mentioned below presented to the hospital with the chief complaint of sudden onset of unresponsiveness at his workplace but 3 days ago, on the day of admission. Patient has known history of panhypopituitarism post pituitary tumor resection but 2 years ago. He is on thyroid, adrenal supplements but is not compliant with them. (His cortisol level was 2 and TSH was less than 0.01 with a free thyroxine of 1.9 on presentation). He is also been diagnosed with type 2 diabetes but is not taking any medications. On the day of admission patient was working in [employer] without any air conditioning in the heat. 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 He was transferred to the ER. Did have bowel and bladder incontinence at that time. Did not have any involuntary movements. He was seen by the ER physician and neurology, underwent CT angiogram of his head and neck vessels and CT angiogram of his chest to rule out PE both of these were negative. CT chest did not reveal any pulmonary infiltrates or vascular congestion. These procedure was done on 27 July 2020. His creatinine on presentation was 0.9. Subsequently he was ruled in for COVID and was transferred to the covered floor. He was seen by ID and no definite treatment was recommended. He was also evaluated by neurology and was suspected to have seizure-like activity and was placed on Keppra.   Subsequently patient had a cardiorespiratory arrest and sudden decompensation of his hemodynamic status and was transferred to the ICU. He had a total of 5 episodes each requiring CPR and ACLS protocol before ROSC was established. He was then placed on levo fed and needless to say intubated. His creatinine prior to the arrest had gone up to 1.2 and subsequently has continued to worsen. Peaked at 3.8. Urine output has been scant as well. Stevens catheter was placed. At the time of evaluation patient is sedated intubated on the ventilator unable to answer any questions. Is off pressors. FiO2 is 70%. Urine output has been suboptimal is in a positive fluid balance of at least 4 to 5 L. Patient was also placed on a bicarbonate infusion as he had developed anion gap and non-anion gap metabolic acidosis post cardiorespiratory arrest.  He also developed elevated blood sugars likely from hydrocortisone use in the setting of known diabetes. He now seems to be developing respiratory alkalosis and iatrogenic metabolic alkalosis. Last ABG showed a pH of 7.52, PCO2 of 47 PO2 of 100 and a bicarb of 23 with an anion gap of 15      Past History/Allergies? Social History:     Past Medical History:   Diagnosis Date    Depression     ED (erectile dysfunction)     Hypothyroid     Migraine     Pituitary tumor     Type 2 diabetes mellitus (HCC)     resolved with 50# weight loss       No Known Allergies    Social History     Socioeconomic History    Marital status:      Spouse name: Not on file    Number of children: Not on file    Years of education: Not on file    Highest education level: Not on file   Occupational History    Not on file   Social Needs    Financial resource strain: Not on file    Food insecurity     Worry: Not on file     Inability: Not on file    Transportation needs     Medical: Not on file     Non-medical: Not on file   Tobacco Use    Smoking status: Never Smoker    Smokeless tobacco: Never Used   Substance and Sexual Activity    Alcohol use: Never     Frequency: Never    Drug use: Not on file    Sexual activity: Not on file   Lifestyle    Physical activity     Days per week: Not on file     Minutes per session: Not on file    Stress: Not on file   Relationships    Social connections     Talks on phone: Not on file     Gets together: Not on file     Attends Jain service: Not on file     Active member of club or organization: Not on file     Attends meetings of clubs or organizations: Not on file     Relationship status: Not on file    Intimate partner violence     Fear of current or ex partner: Not on file     Emotionally abused: Not on file     Physically abused: Not on file     Forced sexual activity: Not on file   Other Topics Concern    Not on file   Social History Narrative    Not on file       Family History:      History reviewed. No pertinent family history. Outpatient Medications:     No medications prior to admission.     Current Medications:     Scheduled Meds:    insulin lispro  0-12 Units Subcutaneous Q6H    famotidine (PEPCID) injection  20 mg Intravenous BID    insulin glargine  10 Units Subcutaneous Nightly    hydrocortisone sodium succinate PF  50 mg Intravenous Q6H    piperacillin-tazobactam  3.375 g Intravenous Q8H    levothyroxine  50 mcg Intravenous Daily    And    sodium chloride (PF)  5 mL Intravenous Daily    sodium chloride flush  10 mL Intravenous 2 times per day    sodium chloride flush  10 mL Intravenous 2 times per day    aspirin  81 mg Oral Daily    atorvastatin  40 mg Oral Nightly    levetiracetam  1,000 mg Intravenous Q12H     Continuous Infusions:    midazolam 2 mg/hr (07/30/20 0500)    sodium chloride      IV infusion builder      norepinephrine Stopped (07/30/20 0445)    EPINEPHrine infusion Stopped (07/29/20 1910)    dextrose       PRN Meds:  metoprolol, potassium chloride, sodium chloride flush, acetaminophen **OR** acetaminophen, polyethylene glycol, GLUCOSE 261* 352* 350*   CALCIUM 9.4 8.5* 7.8*      Phosphorus:     Recent Labs     07/30/20  0940   PHOS 2.3*     Magnesium:    Recent Labs     07/29/20  1522 07/30/20  0419 07/30/20  0940   MG 2.3 1.7 1.5*     Albumin:    Recent Labs     07/27/20  2057 07/28/20  0807   LABALBU 3.8 4.2     BNP:    No results found for: BNP  JOEL:    No results found for: JOEL  SPEP:  Lab Results   Component Value Date    PROT 8.1 07/28/2020     UPEP:   No results found for: LABPE  C3:   No results found for: C3  C4:   No results found for: C4  MPO ANCA:   No results found for: MPO  PR3 ANCA:   No results found for: PR3  Anti-GBM:   No results found for: GBMABIGG  Hep BsAg:       No results found for: HEPBSAG  Hep C AB:        No results found for: HEPCAB    Urinalysis/Chemistries:      Lab Results   Component Value Date    NITRU NEGATIVE 07/27/2020    COLORU YELLOW 07/27/2020    PHUR 5.0 07/27/2020    WBCUA None 07/27/2020    RBCUA 0 TO 2 07/27/2020    MUCUS NOT REPORTED 07/27/2020    TRICHOMONAS NOT REPORTED 07/27/2020    YEAST NOT REPORTED 07/27/2020    BACTERIA NOT REPORTED 07/27/2020    SPECGRAV 1.052 07/27/2020    LEUKOCYTESUR NEGATIVE 07/27/2020    UROBILINOGEN Normal 07/27/2020    BILIRUBINUR NEGATIVE 07/27/2020    GLUCOSEU NEGATIVE 07/27/2020    KETUA NEGATIVE 07/27/2020    AMORPHOUS NOT REPORTED 07/27/2020     Urine Sodium:   No results found for: SABRA  Urine Potassium:  No results found for: KUR  Urine Chloride:  No results found for: CLUR  Urine Osmolarity: No results found for: OSMOU  Urine Protein:   No results found for: TPU  Urine Creatinine:   No results found for: LABCREA  Urine Eosinophils:  No components found for: UEOS    Radiology:     CXR:     Assessment:     1.  Acute Kidney Injury: secondary to ischemic and nephrotoxic acute tubular necrosis from contrast exposure, including CTPA, CT angiogram of the head and neck, additionally from shock, low flow, post cardiorespiratory arrest, appears to be evolving, oliguric although some improvement in urine output. Baseline 0.9 peaked up to 3.9 now appears to have plateaued  2. Status post cardiorespiratory arrest unclear etiology work-up in progress  3. Respiratory failure post cardiac arrest on the ventilator FiO2 70%  4. Anion gap and non-non-gap metabolic acidosis secondary to acute kidney injury on presentation, now developing resp and metabolic alkalosis  5. Mild hypernatremia likely iatrogenic  6. COVID-19 positive  6. Suspected new onset seizures  7. Plan: Contrast exposure   1. Change IV fluids to one third saline at 100 mL an hour  2. Hold diuretics for now  3. Will Check Renal Ultrasound to r/o element of obstruction and to assess the kidney size/echotexture. 4. Comprehensive urine testing including Urinalysis, Urine sodium, potassium, chloride, Urine protein and creatinine to quantify the proteinuria  5. Will Order serum and urine protein electrophoresis to r/o element to occult paraprotein disease  6. Will order Hepatitis B and C, JOEL, ANCA. 7.  Chest x-ray x-ray to assess volume status  8. May need diuresis by tomorrow  9. Follow renal function  Nutrition   Please ensure that patient is on a renal diet/TF. Avoid nephrotoxic drugs/contrast exposure. Thank you for the consultation. Please do not hesitate to contact us for any further questions/concerns. We will continue to follow along with you.

## 2020-07-30 NOTE — PLAN OF CARE
Problem: Airway Clearance - Ineffective  Goal: Achieve or maintain patent airway  7/30/2020 0940 by Ananda Andino RN  Outcome: Ongoing  7/30/2020 0557 by Naya Mckeon RN  Outcome: Ongoing     Problem: Gas Exchange - Impaired  Goal: Absence of hypoxia  7/30/2020 0940 by Ananda Andino RN  Outcome: Ongoing  7/30/2020 0557 by Naya Mckeon RN  Outcome: Ongoing     Problem: Gas Exchange - Impaired  Goal: Promote optimal lung function  7/30/2020 0940 by Ananda Andino RN  Outcome: Ongoing  7/30/2020 0557 by Naya Mckeon RN  Outcome: Ongoing     Problem: Breathing Pattern - Ineffective  Goal: Ability to achieve and maintain a regular respiratory rate  7/30/2020 0940 by Ananda Andino RN  Outcome: Ongoing  7/30/2020 0557 by Naya Mckeon RN  Outcome: Ongoing     Problem: Body Temperature -  Risk of, Imbalanced  Goal: Will regain or maintain usual level of consciousness  7/30/2020 0940 by Ananda Andino RN  Outcome: Ongoing  7/30/2020 0557 by Naya Mckeon RN  Outcome: Ongoing     Problem:  Body Temperature -  Risk of, Imbalanced  Goal: Complications related to the disease process, condition or treatment will be avoided or minimized  7/30/2020 0940 by Ananda Andino RN  Outcome: Ongoing  7/30/2020 0557 by Naya Mckeon RN  Outcome: Ongoing     Problem: Isolation Precautions - Risk of Spread of Infection  Goal: Prevent transmission of infection  7/30/2020 0940 by Ananda Andino RN  Outcome: Ongoing  7/30/2020 0557 by Naya Mckeon RN  Outcome: Ongoing

## 2020-07-30 NOTE — PLAN OF CARE
[facility] 18    Second Visit Note  For more detailed information please refer to the progress note of the day      7/30/2020    4:47 PM    Name:   Amina Delaney  MRN:     [id number]     [place]:      [de-identified]   Room:   3003007-01   Day:  3  Admit Date:  7/27/2020  8:06 PM    PCP:   No primary care provider on file. Code Status:  Full Code      Pt vitals were reviewed   New labs were reviewed   Patient was not seen    Updated plan :     1. I called and spoke with [place] and significant other Tc and updated about patient's clinical status. I also recommended family to have POA selected. Will have spiritual services help patient's family with POA process.       Saida Velazquez MD  7/30/2020  4:47 PM

## 2020-07-30 NOTE — PROGRESS NOTES
[facility]      Daily Progress Note     Admit Date: 7/27/2020  Bed/Room No.  3007/3007-01  Admitting Physician : Shira Witt MD  Code Status :[address] Day:  LOS: 3 days   Chief Complaint:     Chief Complaint   Patient presents with    Altered Mental Status     Principal Problem:    COVID-19 virus infection  Active Problems:    Toxic metabolic encephalopathy    Secondary hypothyroidism    History of pituitary tumor    Seizure ([address]    Somnolence    Shock circulatory ([address]    Pituitary insufficiency ([address]    High anion gap metabolic acidosis    New onset atrial fibrillation ([address]    Pulseless electrical activity ([address]  Resolved Problems:    * No resolved hospital problems. *    Subjective : Interval History/Significant events :  07/30/20    Patient remains intubated. Blood pressures improved and patient is currently off pressors. Had low-grade fever 100 degree Fahrenheit last night. Patient has low urine output. Kidney function continues to worsen. Patient is still unresponsive without sedation. Vitals - Unstable afebrile  Labs -hyponatremia, hypokalemia, BUN 20, creatinine 3.69, lactic acid improving slowly down to 5.4 now. Hyperglycemic. Nursing notes , Consults notes reviewed. Overnight events and updates discussed with Nursing staff . Background History:         Sheryle Re is 60 y.o. male  Who was admitted to the hospital on 7/27/2020 for treatment of COVID-19 virus infection. Patient was brought to emergency room with altered mental status. He was at work and was found on the ground several hours later. Patient was confused, drowsy and not answering questions. He did not have any focal weakness. Evaluation in ER showed temperature 102. Lab evaluation showed low cortisol level, low TSH. COVID-19 test was positive. CT chest was negative for PE and did not show any Pneumonia. Neurology was consulted for possible TIA Vs syncope.  CT head was negative for acute IC abnormality. Patient has history of pituitary adenoma S/P resection in 10/2016 at [facility] clinic by Dr. Gillian Toure. Patient had failed to follow-up with the clinic. Patient follows with Dr. Fidelia Mayo MD.  It appears from outpatient note with PCP on 5/13/2020 that patient has not been taking his medications. He has not been following his endocrinologist.  He has chronic pituitary insufficiency and was supposed to be on hydrocortisone, desmopressin, levothyroxine, testosterone and diabetic medications. He has lost 50 pounds and had stopped all medications. Patient was found to have  decrease cortisol and TSH levels. Patient was lethargic and confused. He had PEA arrest on 7/29/2020 with successful ROSC after couple rounds of CPR followed by new onset A. fib. Patient was given amiodarone 150 mg dose and converted normal sinus rhythm. He had more episodes of multiple PEA arrest successfully resuscitated with CPR. Patient had circulatory shock and needed intubation, pressor support with Levophed, epinephrine. Patient has severe anion gap metabolic acidosis  treated with bicarb bolus and infusion. Patient was started on IV Solu-Cortef, IV levothyroxine for pituitary insufficiency. Levophed was gradually weaned blood pressure improved.   PMH:  Past Medical History:   Diagnosis Date    Depression     ED (erectile dysfunction)     Hypothyroid     Migraine     Pituitary tumor     Type 2 diabetes mellitus (HCC)     resolved with 50# weight loss      Allergies: No Known Allergies   Medications :  insulin lispro, 0-12 Units, Subcutaneous, Q6H  famotidine (PEPCID) injection, 20 mg, Intravenous, BID  insulin glargine, 10 Units, Subcutaneous, Nightly  hydrocortisone sodium succinate PF, 50 mg, Intravenous, Q6H  piperacillin-tazobactam, 3.375 g, Intravenous, Q8H  levothyroxine, 50 mcg, Intravenous, Daily    And  sodium chloride (PF), 5 mL, Intravenous, Daily  sodium chloride flush, 10 mL, Intravenous, 2 times per day  sodium chloride flush, 10 mL, Intravenous, 2 times per day  aspirin, 81 mg, Oral, Daily  atorvastatin, 40 mg, Oral, Nightly  levetiracetam, 1,000 mg, Intravenous, Q12H    Home medications   acetaminophen (TYLENOL) 500 mg tablet, Take 2 tablets (1,000 mg total) by mouth every 12 (twelve) hours. , Disp: 100 tablet, Rfl: 2   desmopressin (DDAVP) 0.1 mg tablet, Take 1 tablet (100 mcg total) by mouth 2 (two) times a day. (Patient not taking: Reported on 5/13/2019 ), Disp: 60 tablet, Rfl: 6   desmopressin (DDAVP) 0.2 mg tablet, Take 1 tablet (200 mcg total) by mouth 2 (two) times a day. (Patient not taking: Reported on 5/13/2019 ), Disp: 30 tablet, Rfl: 6   food supplemt, lactose-reduced (BOOST CALORIE SMART) liquid, Take 237 mL by mouth 3 (three) times a day with meals. (Patient not taking: Reported on 5/13/2019 ), Disp: 100 Can, Rfl: 3   hydrocortisone (CORTEF) 5 mg tablet, Take 1 tablet (5 mg total) by mouth See Admin Instructions. 3 tabs in am, 1 tab in pm (Patient not taking: Reported on 5/13/2019 ), Disp: 120 tablet, Rfl: 6   levothyroxine (SYNTHROID, LEVOTHROID) 100 MCG tablet, Take 1 tablet (100 mcg total) by mouth daily. (Patient not taking: Reported on 5/13/2019 ), Disp: 90 tablet, Rfl: 1   megestrol (MEGACE) 400 mg/10 mL (40 mg/mL) suspension, Take 10 mL (400 mg total) by mouth daily. (Patient not taking: Reported on 5/13/2019 ), Disp: 480 mL, Rfl: 2   meloxicam (MOBIC) 15 mg tablet, Take 1 tablet (15 mg total) by mouth daily. (Patient not taking: Reported on 5/13/2019 ), Disp: 30 tablet, Rfl: 2   ondansetron (ZOFRAN) 4 mg tablet, Take 1 tablet (4 mg total) by mouth every 8 (eight) hours as needed for nausea or vomiting for up to 12 doses.  (Patient not taking: Reported on 5/13/2019 ), Disp: 12 tablet, Rfl: 0   sertraline (ZOLOFT) 100 mg tablet, TAKE 1 TABLET BY MOUTH ONCE DAILY (Patient not taking: Reported on 5/13/2019), Disp: 30 tablet, Rfl: 2   testosterone (ANDRODERM) 4 mg/24 hr patch 24 hour, Place 1 patch on the skin daily.  (Patient not taking: Reported on 5/13/2019 ), Disp: 30 patch, Rfl: 2   traZODone (DESYREL) 50 mg tablet, TAKE 1 TABLET BY MOUTH NIGHTLY (Patient not taking: Reported on 5/13/2019), Disp: 30 tablet, Rfl: 3    Review of Systems   Review of Systems   Unable to perform ROS: Intubated     Objective :      Current Vitals : Temp: 99.5 °F (37.5 °C),  Pulse: 99, Resp: 26, BP: (!) 140/83, SpO2: 100 %  Last 24 Hrs Vitals   Patient Vitals for the past 24 hrs:   BP Temp Temp src Pulse Resp SpO2   07/30/20 1300 (!) 140/83 -- -- 99 26 --   07/30/20 1200 133/81 -- -- 100 26 100 %   07/30/20 1100 130/81 -- -- 102 27 --   07/30/20 1000 134/82 -- -- 105 (!) 32 --   07/30/20 0910 -- -- -- -- 29 --   07/30/20 0900 135/81 -- -- 107 27 --   07/30/20 0829 -- -- -- 109 28 --   07/30/20 0800 125/73 99.5 °F (37.5 °C) Bladder 112 25 100 %   07/30/20 0700 120/76 -- -- 116 28 --   07/30/20 0600 111/67 -- -- 113 28 --   07/30/20 0545 -- -- -- 116 29 --   07/30/20 0530 -- -- -- 118 29 --   07/30/20 0515 -- -- -- 112 (!) 40 --   07/30/20 0500 121/71 100.2 °F (37.9 °C) Bladder 120 (!) 40 100 %   07/30/20 0445 -- -- -- 108 (!) 39 91 %   07/30/20 0430 -- -- -- 109 (!) 39 90 %   07/30/20 0419 -- -- -- 107 (!) 39 90 %   07/30/20 0415 -- -- -- 102 (!) 39 (!) 87 %   07/30/20 0400 124/80 -- -- 98 (!) 38 (!) 88 %   07/30/20 0345 -- -- -- 108 (!) 38 90 %   07/30/20 0330 -- -- -- 110 (!) 38 90 %   07/30/20 0315 -- -- -- 110 (!) 37 91 %   07/30/20 0300 (!) 141/87 -- -- 109 (!) 37 93 %   07/30/20 0245 -- -- -- 109 (!) 37 93 %   07/30/20 0230 -- -- -- 111 (!) 37 95 %   07/30/20 0215 -- -- -- 110 (!) 36 95 %   07/30/20 0200 133/85 -- -- 106 (!) 36 --   07/30/20 0100 (!) 145/93 94.6 °F (34.8 °C) Bladder 109 (!) 35 90 %   07/30/20 0045 -- -- -- 107 (!) 35 (!) 82 %   07/30/20 0030 -- -- -- 109 (!) 35 100 %   07/30/20 0015 -- -- -- 110 (!) 35 --   07/30/20 0000 (!) 142/90 -- -- 110 (!) 35 --   07/29/20 2345 -- -- -- 109 (!) 35 --   07/29/20 2330 -- -- -- 107 (!) 33 --   07/29/20 2315 -- -- -- 108 (!) 33 --   07/29/20 2300 (!) 139/92 -- -- -- -- --   07/29/20 2200 (!) 149/91 -- -- 107 (!) 32 --   07/29/20 2145 -- -- -- 106 (!) 32 --   07/29/20 2130 -- -- -- 107 (!) 33 100 %   07/29/20 2115 -- -- -- 110 (!) 31 100 %   07/29/20 2100 (!) 156/89 96.5 °F (35.8 °C) Oral 111 (!) 31 100 %   07/29/20 2045 -- -- -- 113 (!) 32 100 %   07/29/20 2030 -- -- -- 108 (!) 31 100 %   07/29/20 2015 -- -- -- 107 (!) 31 100 %   07/29/20 2000 (!) 156/91 -- -- 109 30 100 %   07/29/20 1945 -- -- -- 109 30 100 %   07/29/20 1930 -- -- -- 107 30 100 %   07/29/20 1928 -- -- -- 107 30 100 %   07/29/20 1915 -- -- -- 108 30 --   07/29/20 1900 (!) 154/86 -- -- 102 29 100 %   07/29/20 1830 -- -- -- 106 29 100 %   07/29/20 1815 -- -- -- 108 29 --   07/29/20 1800 (!) 140/88 -- -- 108 30 100 %   07/29/20 1745 -- -- -- 104 28 --   07/29/20 1730 -- -- -- 105 29 --   07/29/20 1715 -- -- -- 104 (!) 50 100 %   07/29/20 1700 132/82 -- -- 104 (!) 52 100 %   07/29/20 1645 -- -- -- 104 (!) 32 100 %   07/29/20 1630 -- -- -- 102 (!) 51 100 %   07/29/20 1615 -- -- -- 100 (!) 43 100 %   07/29/20 1600 130/78 -- -- 99 (!) 31 100 %   07/29/20 1545 -- -- -- 98 (!) 32 100 %   07/29/20 1530 -- -- -- 98 30 100 %   07/29/20 1515 -- -- -- 98 27 100 %   07/29/20 1500 135/67 -- -- 89 22 100 %   07/29/20 1450 -- -- -- -- 18 100 %   07/29/20 1445 -- -- -- 74 22 100 %   07/29/20 1441 -- -- -- 72 14 100 %   07/29/20 1430 139/64 -- -- 73 22 100 %   07/29/20 1415 134/68 -- -- 71 22 100 %   07/29/20 1400 134/62 -- Oral 80 24 100 %   07/29/20 1345 (!) 186/97 -- -- 105 19 --   07/29/20 1330 -- -- -- 93 (!) 31 (!) 85 %     Intake / output   07/29 0701 - 07/30 0700  In: 4651 [I.V.:4651]  Out: 2368 [Urine:1090]  Physical Exam:  Physical Exam  Vitals signs and nursing note reviewed. Constitutional:       Appearance: He is well-developed. He is ill-appearing.       Interventions: He is intubated. Eyes:      Pupils:      Right eye: Pupil is sluggish. Pupil is reactive. Left eye: Pupil is sluggish. Pupil is reactive. Comments: Dilated   Neck:      Thyroid: No thyroid mass. Cardiovascular:      Rate and Rhythm: Normal rate and regular rhythm. Pulses: Normal pulses. Heart sounds: Normal heart sounds. No murmur. Pulmonary:      Effort: He is intubated. Breath sounds: Normal breath sounds. Musculoskeletal:      Right lower leg: No edema. Left lower leg: No edema. Lymphadenopathy:      Cervical: No cervical adenopathy. Skin:     Capillary Refill: Capillary refill takes less than 2 seconds. Neurological:      GCS: GCS eye subscore is 1. GCS verbal subscore is 1. GCS motor subscore is 1. Motor: No tremor or abnormal muscle tone.            Laboratory findings:    Recent Labs     07/27/20 2057 07/28/20  0807 07/29/20  1324 07/30/20  0419   WBC 5.3 6.3 10.5 11.8*   HGB 14.5 15.6 13.8 12.5*   HCT 43.8 47.1 46.5 36.5*    232 208 See Reflexed IPF Result   INR 1.1 1.0  --   --      Recent Labs     07/29/20  1522 07/29/20  1809 07/30/20  0419 07/30/20  0940   * 147* 147* 145*   K 3.5* 3.5* 2.1* 3.6*   * 114* 111* 110*   CO2 8* 7* 14* 20   GLUCOSE 285* 261* 352* 350*   BUN 28* 31* 32* 33*   CREATININE 3.26* 3.82* 3.67* 3.69*   MG 2.3  --  1.7 1.5*   CALCIUM 9.7 9.4 8.5* 7.8*   PHOS  --   --   --  2.3*     Recent Labs     07/27/20 2057 07/28/20  0807 07/28/20  2230   PROT 7.3 8.1  --    LABALBU 3.8 4.2  --    LABA1C  --  5.3  --    TSH  --   --  <0.01*   AST 30 44*  --    ALT 18 27  --    LDH  --  243*  --    ALKPHOS 117 126  --    BILITOT 0.76 0.56  --    CHOL  --  150  --    HDL  --  24*  --    LDLCHOLESTEROL  --  100  --    CHOLHDLRATIO  --  6.3*  --    TRIG  --  131  --    VLDL  --  NOT REPORTED  --           Specific Gravity, UA   Date Value Ref Range Status   07/27/2020 1.052 (H) 1.005 - 1.030 Final     Protein, UA   Date Value Ref Range Status   07/27/2020 NEGATIVE NEGATIVE Final     RBC, UA   Date Value Ref Range Status   07/27/2020 0 TO 2 0 - 4 /HPF Final     Comment:     Reference range defined for non-centrifuged specimen. Bacteria, UA   Date Value Ref Range Status   07/27/2020 NOT REPORTED None Final     Nitrite, Urine   Date Value Ref Range Status   07/27/2020 NEGATIVE NEGATIVE Final     WBC, UA   Date Value Ref Range Status   07/27/2020 None 0 - 5 /HPF Final     Leukocyte Esterase, Urine   Date Value Ref Range Status   07/27/2020 NEGATIVE NEGATIVE Final       Imaging / Clinical Data :-   Ct Head Wo Contrast    Result Date: 7/27/2020  No acute intracranial abnormality. Findings were communicated electronically to Dr. Kyler Silveira through the radiology results communication center. Ct Chest Pulmonary Embolism W Contrast    Result Date: 7/27/2020  No evidence of pulmonary embolism or acute pulmonary abnormality. Cta Head Neck W Contrast    Result Date: 7/27/2020  No large vessel occlusion or hemodynamic stenosis Multilevel degenerate changes cervical spine. Clinical Course : Unstable. Assessment and Plan  :        1. Acute metabolic encephalopathy -secondary to pituitary insufficiency -continue IV hydrocortisone, levothyroxine IV . Neurology following. .  Patient has poor response of sedation. Likely anoxic brain injury. 2. Circulatory shock-improved with crystalloids, pressor support. Currently off Levophed. 3. Acute respiratory failure -intubated. Critical care for vent management. Empiric Zosyn for possible aspiration. 4. Acute kidney injury secondary to prerenal ischemic ATN -evolving. Secondary to circulatory shock. Continue fluid resuscitation. Nephrology on board. 5. Anion gap metabolic acidosis secondary circulatory shock. Bicarb infusion. 6. PEA arrest-cardiology consultation. 7. Secondary hypothyroidism -IV levothyroxine  8. COVID -19 infection without pneumonia.   9. H/O pituitary tumour s/p resection -10/2016 -   10. Type 2 diabetes mellitus-check A1c. Humalog as needed    DVT prophylaxis  GI prophylaxis       Discussed with critical care attending. Continue to monitor vitals , Intake / output ,  Cell count , HGB , Kidney function, oxygenation  as indicated .      Plan discussed with Staff Tim Salas RN     (Please note that portions of this note were completed with a voice recognition program. Efforts were made to edit the dictations but occasionally words are mis-transcribed.)      Bess Nash MD  7/30/2020

## 2020-07-30 NOTE — PLAN OF CARE
Problem: Airway Clearance - Ineffective  Goal: Achieve or maintain patent airway  7/30/2020 0557 by Naya Mckeon RN  Outcome: Ongoing     Problem: Gas Exchange - Impaired  Goal: Absence of hypoxia  7/30/2020 0557 by Naya Mckeon RN  Outcome: Ongoing     Problem: Gas Exchange - Impaired  Goal: Promote optimal lung function  7/30/2020 0557 by Naya Mckeon RN  Outcome: Ongoing     Problem: Breathing Pattern - Ineffective  Goal: Ability to achieve and maintain a regular respiratory rate  7/30/2020 0557 by Naya Mckeon RN  Outcome: Ongoing     Problem:  Body Temperature -  Risk of, Imbalanced  Goal: Ability to maintain a body temperature within defined limits  Outcome: Ongoing  Goal: Will regain or maintain usual level of consciousness  Outcome: Ongoing  Goal: Complications related to the disease process, condition or treatment will be avoided or minimized  Outcome: Ongoing     Problem: Isolation Precautions - Risk of Spread of Infection  Goal: Prevent transmission of infection  Outcome: Ongoing     Problem: Nutrition Deficits  Goal: Optimize nutrtional status  Outcome: Ongoing     Problem: Risk for Fluid Volume Deficit  Goal: Maintain normal heart rhythm  Outcome: Ongoing  Goal: Maintain absence of muscle cramping  Outcome: Ongoing  Goal: Maintain normal serum potassium, sodium, calcium, phosphorus, and pH  Outcome: Ongoing     Problem: Loneliness or Risk for Loneliness  Goal: Demonstrate positive use of time alone when socialization is not possible  Outcome: Ongoing     Problem: Fatigue  Goal: Verbalize increase energy and improved vitality  Outcome: Ongoing     Problem: Patient Education: Go to Patient Education Activity  Goal: Patient/Family Education  Outcome: Ongoing     Problem: Skin Integrity:  Goal: Will show no infection signs and symptoms  Description: Will show no infection signs and symptoms  Outcome: Ongoing  Goal: Absence of new skin breakdown  Description: Absence of new skin breakdown  Outcome: Ongoing     Problem: Falls - Risk of:  Goal: Will remain free from falls  Description: Will remain free from falls  Outcome: Ongoing  Goal: Absence of physical injury  Description: Absence of physical injury  Outcome: Ongoing     Problem: Confusion - Acute:  Goal: Absence of continued neurological deterioration signs and symptoms  Description: Absence of continued neurological deterioration signs and symptoms  Outcome: Ongoing  Goal: Mental status will be restored to baseline  Description: Mental status will be restored to baseline  Outcome: Ongoing     Problem: Discharge Planning:  Goal: Ability to perform activities of daily living will improve  Description: Ability to perform activities of daily living will improve  Outcome: Ongoing  Goal: Participates in care planning  Description: Participates in care planning  Outcome: Ongoing     Problem: Injury - Risk of, Physical Injury:  Goal: Will remain free from falls  Description: Will remain free from falls  Outcome: Ongoing  Goal: Absence of physical injury  Description: Absence of physical injury  Outcome: Ongoing     Problem: Mood - Altered:  Goal: Mood stable  Description: Mood stable  Outcome: Ongoing  Goal: Absence of abusive behavior  Description: Absence of abusive behavior  Outcome: Ongoing  Goal: Verbalizations of feeling emotionally comfortable while being cared for will increase  Description: Verbalizations of feeling emotionally comfortable while being cared for will increase  Outcome: Ongoing     Problem: Psychomotor Activity - Altered:  Goal: Absence of psychomotor disturbance signs and symptoms  Description: Absence of psychomotor disturbance signs and symptoms  Outcome: Ongoing     Problem: Sensory Perception - Impaired:  Goal: Demonstrations of improved sensory functioning will increase  Description: Demonstrations of improved sensory functioning will increase  Outcome: Ongoing  Goal: Decrease in sensory misperception frequency  Description: Decrease in sensory misperception frequency  Outcome: Ongoing  Goal: Able to refrain from responding to false sensory perceptions  Description: Able to refrain from responding to false sensory perceptions  Outcome: Ongoing  Goal: Demonstrates accurate environmental perceptions  Description: Demonstrates accurate environmental perceptions  Outcome: Ongoing  Goal: Able to distinguish between reality-based and nonreality-based thinking  Description: Able to distinguish between reality-based and nonreality-based thinking  Outcome: Ongoing  Goal: Able to interrupt nonreality-based thinking  Description: Able to interrupt nonreality-based thinking  Outcome: Ongoing     Problem: Sleep Pattern Disturbance:  Goal: Appears well-rested  Description: Appears well-rested  Outcome: Ongoing     Problem: Restraint Use - Nonviolent/Non-Self-Destructive Behavior:  Goal: Absence of restraint indications  Description: Absence of restraint indications  Outcome: Ongoing  Goal: Absence of restraint-related injury  Description: Absence of restraint-related injury  Outcome: Ongoing     Problem: HEMODYNAMIC STATUS  Goal: Patient has stable vital signs and fluid balance  Outcome: Ongoing       Problem: SKIN INTEGRITY  Goal: Skin integrity is maintained or improved  7/30/2020 0557 by Dinora Figueredo RN  Outcome: Ongoing     Problem: NUTRITION  Goal: Nutritional status is improving  Outcome: Ongoing

## 2020-07-30 NOTE — PROGRESS NOTES
Progress Note    Patient - Tabitha Glez  Date of Admission -  2020  8:06 PM  Date of Evaluation -  2020  Room and Bed Number -  2607/7651-53   Hospital Day - 3    CHIEF COMPLAINT : covid -19 pneumonia    SUBJECTIVE:     OVERNIGHT EVENTS:       Over night events noted   uo is improving   He is off pressors   Not response and not on sedation     AWAKE & FOLLOWING COMMANDS:  [x] No   [] Yes    SECRETIONS Amount:  [x] Small [] Moderate  [] Large  [] None  Color:     [x] White [] Colored  [] Bloody    SEDATION:  RAAS Score:  [] Propofol gtt  [] Versed gtt  [] Ativan gtt   [x] No Sedation    PARALYZED:  [x] No    [] Yes    VASOPRESSORS:  [x] No    [] Yes  [] Levophed [] Dopamine [] Vasopressin  [] Dobutamine [] Phenylephrine [] Epinephrine      OBJECTIVE:     VITAL SIGNS:  /86   Pulse 98   Temp 99.5 °F (37.5 °C) (Bladder)   Resp (!) 32   Ht 5' 10\" (1.778 m)   Wt 170 lb (77.1 kg)   SpO2 100%   BMI 24.39 kg/m²   Tmax over 24 hours:  Temp (24hrs), Av.7 °F (36.5 °C), Min:94.6 °F (34.8 °C), Max:100.2 °F (37.9 °C)      Patient Vitals for the past 8 hrs:   BP Temp Temp src Pulse Resp SpO2   20 1400 137/86 -- -- 98 (!) 32 --   20 1300 (!) 140/83 -- -- 99 26 --   20 1200 133/81 -- -- 100 26 100 %   20 1100 130/81 -- -- 102 27 --   20 1000 134/82 -- -- 105 (!) 32 --   20 0910 -- -- -- -- 29 --   20 0900 135/81 -- -- 107 27 --   20 0829 -- -- -- 109 28 --   20 0800 125/73 99.5 °F (37.5 °C) Bladder 112 25 100 %   20 0700 120/76 -- -- 116 28 --         Intake/Output Summary (Last 24 hours) at 2020 1435  Last data filed at 2020 1200  Gross per 24 hour   Intake 5182 ml   Output 1600 ml   Net 3582 ml     Date 20 0000 - 20 2359   Shift 3358-8009 6247-4795 8804-0189 24 Hour Total   INTAKE   I.V.(mL/kg) 4651(60.3) 531(6.9)  6358(94.4)   Shift Total(mL/kg) 6070(85.0) 531(6.9)  3329(95.7)   OUTPUT   Urine(mL/kg/hr) 560(0.9) 310  870 Emesis/NG output(mL/kg)  600(7.8)  600(7.8)   Shift Total(mL/kg) 560(7.3) 910(11.8)  1470(19.1)   Weight (kg) 77.1 77.1 77.1 77.1     Wt Readings from Last 3 Encounters:   07/27/20 170 lb (77.1 kg)     Body mass index is 24.39 kg/m².         PHYSICAL EXAM:  Lungs clear   Unresponsive   No resp to deep pain   Pupils non reactive       MEDICATIONS:  Scheduled Meds:   insulin lispro  0-12 Units Subcutaneous Q6H    famotidine (PEPCID) injection  20 mg Intravenous BID    insulin glargine  10 Units Subcutaneous Nightly    hydrocortisone sodium succinate PF  50 mg Intravenous Q6H    piperacillin-tazobactam  3.375 g Intravenous Q8H    levothyroxine  50 mcg Intravenous Daily    And    sodium chloride (PF)  5 mL Intravenous Daily    sodium chloride flush  10 mL Intravenous 2 times per day    sodium chloride flush  10 mL Intravenous 2 times per day    aspirin  81 mg Oral Daily    atorvastatin  40 mg Oral Nightly    levetiracetam  1,000 mg Intravenous Q12H     Continuous Infusions:   midazolam 2 mg/hr (07/30/20 0500)    sodium bicarbonate infusion 200 mL/hr at 07/30/20 1426    norepinephrine Stopped (07/30/20 0445)    EPINEPHrine infusion Stopped (07/29/20 1910)    dextrose       PRN Meds:   metoprolol, 2.5 mg, Q6H PRN  potassium chloride, 20 mEq, PRN  sodium chloride flush, 10 mL, PRN  acetaminophen, 650 mg, Q6H PRN    Or  acetaminophen, 650 mg, Q6H PRN  polyethylene glycol, 17 g, Daily PRN  promethazine, 12.5 mg, Q6H PRN    Or  ondansetron, 4 mg, Q6H PRN  nicotine, 1 patch, Daily PRN  LORazepam, 2 mg, Q4H PRN  diphenhydrAMINE, 25 mg, Q6H PRN  glucose, 15 g, PRN  dextrose, 12.5 g, PRN  glucagon (rDNA), 1 mg, PRN  dextrose, 100 mL/hr, PRN        SUPPORT DEVICES: [x] Ventilator [] BIPAP  [] Nasal Cannula [] Room Air    VENT SETTINGS (Comprehensive) (if applicable):    Vent Information  $Ventilation: $Subsequent Day  Skin Assessment: Clean, dry, & intact  Equipment ID: [id number]  Vent Type: Servo i  Vent Mode: PRVC  Vt Ordered: 460 mL  Rate Set: 24 bmp  FiO2 : 40 %  SpO2: 100 %  SpO2/FiO2 ratio: 225  Sensitivity: 5  Flow by: 2  PEEP/CPAP: 5  I Time/ I Time %: 0.8 s  Humidification Source: HME  Additional Respiratory  Assessments  Pulse: 98  Resp: (!) 32  SpO2: 100 %  End Tidal CO2: 23 (%)  Position: Supine  Humidification Source: HME  Oral Care Completed?: Yes  Oral Care: Teeth brushed, Mouth swabbed  Subglottic Suction Done?: Yes  Cuff Pressure (cm H2O): (MLT)    ABGs:     Lab Results   Component Value Date    QLR4MFO 24 07/30/2020    FIO2 40.0 07/30/2020       DATA:  Complete Blood Count:   Recent Labs     07/28/20  0807 07/29/20  1324 07/30/20  0419   WBC 6.3 10.5 11.8*   RBC 5.50 4.98 4.40   HGB 15.6 13.8 12.5*   HCT 47.1 46.5 36.5*   MCV 85.6 93.4 83.0   MCH 28.4 27.7 28.4   MCHC 33.1 29.7 34.2   RDW 14.4 14.9* 14.9*    208 See Reflexed IPF Result   MPV 9.3 10.1 NOT REPORTED        Last 3 Blood Glucose:   Recent Labs     07/27/20 2057 07/28/20  0807 07/29/20  1324 07/29/20  1522 07/29/20  1809 07/30/20  0419 07/30/20  0940   GLUCOSE 72 70 11* 285* 261* 352* 350*        PT/INR:    Lab Results   Component Value Date    PROTIME 10.9 07/28/2020    INR 1.0 07/28/2020     PTT:    Lab Results   Component Value Date    APTT 41.4 07/30/2020       Comprehensive Metabolic Profile:   Recent Labs     07/27/20 2057 07/28/20  0807  07/29/20  1809 07/30/20  0419 07/30/20  0940    137   < > 147* 147* 145*   K 4.0 3.7   < > 3.5* 2.1* 3.6*    106   < > 114* 111* 110*   CO2 16* 14*   < > 7* 14* 20   BUN 11 13   < > 31* 32* 33*   CREATININE 0.94 1.24*   < > 3.82* 3.67* 3.69*   GLUCOSE 72 70   < > 261* 352* 350*   CALCIUM 9.0 9.5   < > 9.4 8.5* 7.8*   PROT 7.3 8.1  --   --   --   --    LABALBU 3.8 4.2  --   --   --   --    BILITOT 0.76 0.56  --   --   --   --    ALKPHOS 117 126  --   --   --   --    AST 30 44*  --   --   --   --    ALT 18 27  --   --   --   --     < > = values in this interval not displayed. Magnesium:   Lab Results   Component Value Date    MG 1.5 07/30/2020    MG 1.7 07/30/2020    MG 2.3 07/29/2020     Phosphorus:   Lab Results   Component Value Date    PHOS 2.3 07/30/2020     Ionized Calcium: No results found for: CAION     Urinalysis:   Lab Results   Component Value Date    NITRU NEGATIVE 07/27/2020    COLORU YELLOW 07/27/2020    PHUR 5.0 07/27/2020    WBCUA None 07/27/2020    RBCUA 0 TO 2 07/27/2020    MUCUS NOT REPORTED 07/27/2020    TRICHOMONAS NOT REPORTED 07/27/2020    YEAST NOT REPORTED 07/27/2020    BACTERIA NOT REPORTED 07/27/2020    SPECGRAV 1.052 07/27/2020    LEUKOCYTESUR NEGATIVE 07/27/2020    UROBILINOGEN Normal 07/27/2020    BILIRUBINUR NEGATIVE 07/27/2020    GLUCOSEU NEGATIVE 07/27/2020    KETUA NEGATIVE 07/27/2020    AMORPHOUS NOT REPORTED 07/27/2020       HgBA1c:    Lab Results   Component Value Date    LABA1C 5.3 07/28/2020     TSH:    Lab Results   Component Value Date    TSH <0.01 07/28/2020       Ct Head Wo Contrast    Result Date: 7/27/2020  No acute intracranial abnormality. Findings were communicated electronically to Dr. Marianne Garcia through the radiology results communication center. Xr Chest Portable    Result Date: 7/29/2020  Right IJ central line appears in satisfactory position without evidence of pneumothorax. Endotracheal tube also appears in satisfactory position. Ct Chest Pulmonary Embolism W Contrast    Result Date: 7/27/2020  No evidence of pulmonary embolism or acute pulmonary abnormality. Cta Head Neck W Contrast    Result Date: 7/27/2020  No large vessel occlusion or hemodynamic stenosis Multilevel degenerate changes cervical spine.         ASSESSMENT:     Acute hypoxic respiratory failure due to covid -19 pneumonia   Metabolic acidosis ag   Lactic acidosis   PEA cardiac arrest   Anoxic brain injury   Shock - adrenal   LEILA   Hypokalemia     COVID-19 virus infection    Secondary hypothyroidism    History of pituitary tumor    Seizure (HCC)    Somnolence

## 2020-07-30 NOTE — PLAN OF CARE
Problem: OXYGENATION/RESPIRATORY FUNCTION  Goal: Patient will maintain patent airway  7/29/2020 1957 by Neela Giron RCP  Outcome: Ongoing  Goal: Patient will achieve/maintain normal respiratory rate/effort  Description: Respiratory rate and effort will be within normal limits for the patient  7/29/2020 1957 by Neela Giron RCP  Outcome: Ongoing     Problem: MECHANICAL VENTILATION  Goal: Mobility/activity is maintained at optimum level for patient  7/29/2020 1957 by Neela Giron RCP  Outcome: Ongoing  Goal: Ability to express needs and understand communication  7/29/2020 1957 by Neela Giron RCP  Outcome: Ongoing  Goal: Patient will maintain patent airway  7/29/2020 1957 by Neela Giron RCP  Outcome: Ongoing  Goal: Patient will maintain patent airway  7/29/2020 1957 by Neela Giron RCP  Outcome: Ongoing  Goal: Oral health is maintained or improved  7/29/2020 1957 by Neela Giron RCP  Outcome: Ongoing  Goal: ET tube will be managed safely  7/29/2020 1957 by Neela Giron RCP  Outcome: Ongoing

## 2020-07-30 NOTE — PROGRESS NOTES
[facility]  Progress Note    Mckenna Fernandez  MRN: [id number]  Date: 7/30/2020    Reason for F/U :   COVID-19 virus infection    Impression :   1. Cardiopulmonary arrest-multiple status post successful resuscitation  2. Acute hypoxic respiratory failure currently ventilator dependent  3. Shock currently on pressors for blood pressure support  4. New onset seizure  5. COVID-19 virus infection without evidence of pneumonia  6. History of pituitary tumor status post resection 2016  7. Hypothyroidism  8. Diabetes mellitus type 2  9. Diarrhea    Recommendations:   · He remains on empiric antimicrobial therapy with Zosyn though no clear infection has been found. · The patient remains ventilator dependent and on pressors. · The culture data so far remains negative  · The cause of the cardiopulmonary arrest is not entirely clear  · The patient does not have significant airspace disease/pneumonia to support anything more than supportive therapy at this time    Infection Control Recommendations:   COVID-19 virus infection    Discharge Planning:   Estimated Length of IV antimicrobials: To be determined  Patient will need Midline Catheter Insertion/ PICC line Insertion: No  Patient will need: Home IV , [place],  SNF,  LTAC: Undetermined  Patient willneed outpatient wound care: No    MedicalDecision making / Summary of Stay:   Mckenna Fernandez is a 60y.o.-year-old male who was initially admitted on 7/27/2020. Adalberto Goodson is seen and evaluated at bedside and the chart was reviewed. He is a poor historian and I was not able to get any history from him. 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 He was last known to be well at about 2 PM when a work colleague checked in on him but he was subsequently found down unresponsive for an unknown period of time. No witnessed seizures. EMS was called and temperature was 102 them on arrival in the emergency department was 100.7.   The patient also lost control of bowel and bladder and was noted to have tongue biting. COVID-19 virus testing was positive and the patient was admitted to the Health system unit. The patient does have a history of a pituitary tumor which was resected 2 years ago. The patient is suspected to have had a seizure and I was asked to evaluate for the COVID-19 infection. The patient does not recall the events of what happened and does not able to get much out of him today. The EEG showed diffuse polymorphic delta theta slowing suggestive of moderate to severe encephalopathy and frontal dominant rhythm delta activity concerning for subclinical seizure. On 2020 the patient had clinical deterioration with a PEA arrest and after successful resuscitation he had subsequent PEA and asystolic arrest requiring ACLS protocol. The patient was intubated started on Levophed for blood pressure support and was transferred to the intensive care unit    Current evaluation:2020    /81   Pulse 107   Temp 99.5 °F (37.5 °C) (Bladder)   Resp 29   Ht 5' 10\" (1.778 m)   Wt 170 lb (77.1 kg)   SpO2 100%   BMI 24.39 kg/m²     Temperature Range: Temp: 99.5 °F (37.5 °C) Temp  Av °F (36.7 °C)  Min: 94.6 °F (34.8 °C)  Max: 100.2 °F (37.9 °C)  The patient is seen and evaluated at bedside he remains intubated sedated on the ventilator at 40% FiO2. He is on Versed for sedation  He is on continuous EEG monitoring. He did have a low-grade fever to 100.2 degrees overnight. Review of Systems   Unable to perform ROS: Intubated       Physical Examination :     Physical Exam  Constitutional:       Appearance: He is well-developed. Interventions: He is sedated and intubated. HENT:      Head: Normocephalic and atraumatic. Neck:      Musculoskeletal: Normal range of motion and neck supple. Cardiovascular:      Rate and Rhythm: Normal rate. Heart sounds: Normal heart sounds. Pulmonary:      Effort: Pulmonary effort is normal. He is intubated. Breath sounds: Normal breath sounds. Abdominal:      General: Bowel sounds are normal. There is distension. Palpations: Abdomen is soft. Lymphadenopathy:      Cervical: Cervical adenopathy present. Skin:     General: Skin is warm and dry. Laboratory data:   I have independently reviewed the followinglabs:  CBC with Differential:   Recent Labs     07/27/20 2057 07/29/20  1324 07/30/20  0419   WBC 5.3   < > 10.5 11.8*   HGB 14.5   < > 13.8 12.5*   HCT 43.8   < > 46.5 36.5*      < > 208 See Reflexed IPF Result   LYMPHOPCT 35  --  61*  --    MONOPCT 16*  --  7  --     < > = values in this interval not displayed. BMP:   Recent Labs     07/29/20  1522 07/29/20  1809 07/30/20  0419   * 147* 147*   K 3.5* 3.5* 2.1*   * 114* 111*   CO2 8* 7* 14*   BUN 28* 31* 32*   CREATININE 3.26* 3.82* 3.67*   MG 2.3  --  1.7     Hepatic Function Panel:   Recent Labs     07/27/20 2057 07/28/20  0807   PROT 7.3 8.1   LABALBU 3.8 4.2   BILITOT 0.76 0.56   ALKPHOS 117 126   ALT 18 27   AST 30 44*     No results for input(s): [facility] in the last 72 hours. No results found for: CRP  No results found for: SEDRATE    Recent Labs     07/27/20 2057   PROCAL 0.19*     Lactic Acid, Whole Blood  5. 4High    0.7 - 2.1 mmol/L  Final  07/30/2020  9:40 AM  [address]      -Dimer, Quant  58.48  mg/L FEU  Final  07/30/2020  4:19 AM  [address]      Fibrinogen  291  140 - 420 mg/dL  Final  07/30/2020  4:19 AM  [address]        Lactic Acid, Whole Blood  10. 3High    0.7 - 2.1 mmol/L  Final  07/29/2020  1:24 PM  [address]        Imaging Studies:   MRI of the brain is pending    Cultures:     Procedure  Component  Value  Units  Date/Time    Culture, Blood 2 [[id number]]   Collected: 07/30/20 0157    Order Status: Sent  Specimen: Blood  Updated: 07/30/20 0455    Culture, Blood 1 [[id number]]   Collected: 07/30/20 0147    Order Status: Sent famotidine (PEPCID) injection  20 mg Intravenous BID    furosemide  80 mg Intravenous Once    insulin glargine  10 Units Subcutaneous Nightly    potassium chloride  20 mEq Intravenous Q1H    hydrocortisone sodium succinate PF  50 mg Intravenous Q6H    piperacillin-tazobactam  3.375 g Intravenous Q8H    levothyroxine  50 mcg Intravenous Daily    And    sodium chloride (PF)  5 mL Intravenous Daily    sodium chloride flush  10 mL Intravenous 2 times per day    sodium chloride flush  10 mL Intravenous 2 times per day    aspirin  81 mg Oral Daily    atorvastatin  40 mg Oral Nightly    levetiracetam  1,000 mg Intravenous Q12H           [facility]  Santo Candelaria MD  Perfect [employer] messaging  OFFICE: [phone]      Electronically signed by Santo Candelaria MD on 7/30/2020 at 9:53 AM  Thank you for allowing us to participate in the care of this patient. Please call with questions. This note iscreated with the assistance of a speech recognition program.  While intending to generate a document that actually reflects the content of the visit, the document can still have some errors including those of syntax andsound a like substitutions which may escape proof reading. In such instances, actual meaning can be extrapolated by contextual diversion.

## 2020-07-30 NOTE — PROGRESS NOTES
[facility]  Documentation Note                Admission Dx: AMS (altered mental status) [R41.82]    Past Medical History:   has a past medical history of Depression, ED (erectile dysfunction), Hypothyroid, Migraine, Pituitary tumor, and Type 2 diabetes mellitus ([address] Previous Testing:     STRESS 4/10/17: Low risk for ischemia. EF 62%. ECHO 4/8/17: EF 55-60%, pericardium is normal.     HOLTER 5/30/18: SR with SB and ST. Rare PACs. ECHO 11/9/16: EF 60%, concentric cardiac remodeling, no valvular abnormalities. Previous office/hospital visit:   Dr. Krzysztof Hsieh 5/30/18:   1. Type 2 diabetes diet controlled  2. Status post pituitary gland tumor removal October 2016  3. Hypothyroidism  4. Erectile dysfunction  5. Migraines    1. Syncope, unknown etiology. 2. Palpitations. 3. Abnormal EKG. 4. History of pituitary tumor removal.    RECOMMENDATIONS:  1. A 48 hour Holter monitor  2. Will obtain records from [facility]. Apparently he is telling me he had a stress test and echo. Will see him back after records are available and Holter monitor for further recommendations. He will probably need also to see neurology due to his syncope, although it might be vasodepressor, but due to his brain surgery, we will consider tilt table test after neurological evaluation.     Abdoulaye Ac, [facility]

## 2020-07-31 NOTE — PROGRESS NOTES
Occupational Therapy Not Seen Note    DATE: 2020  Name: Juliocesar Porras  : 1960  MRN: [id number]    Patient not available for Occupational Therapy due to:    Sedation/ intubated     Next Scheduled Treatment: Re-check 8/3/2020    Electronically signed by MARCO Tavares on 2020 at 8:54 AM

## 2020-07-31 NOTE — PROGRESS NOTES
Nephrology Progress Note      SUBJECTIVE       Pt was seen and examined. prior notes reviewed. Pt presented with unresponsiveness and tested + for COVID , also underwent CTA Chest which was negative for PE. He was seen by neurology for possible seizures   Pt did have an episode of cardiopulmonary arrest and needed CPR. Has required levophed and is intubated. Scheduled to have an MRI . Not having any purposeful movements. Concern regarding anoxic brain injury. Not making much urine and there is progressive rise in his creat . This is likely from a combination of contrast exposure and hypoperfusion with ischemic injury. Baseline creat is normal     OBJECTIVE      CURRENT TEMPERATURE:  Temp: 97.7 °F (36.5 °C)  MAXIMUM TEMPERATURE OVER 24HRS:  Temp (24hrs), Av.5 °F (37.5 °C), Min:97.7 °F (36.5 °C), Max:100.8 °F (38.2 °C)    CURRENT RESPIRATORY RATE:  Resp: 22  CURRENT PULSE:  Pulse: 124  CURRENT BLOOD PRESSURE:  BP: (!) 140/89  24HR BLOOD PRESSURE RANGE:  Systolic (26IYQ), HUE:826 , Min:130 , YIZ:494   ; Diastolic (95EOA), HYM:03, Min:81, Max:92    24HR INTAKE/OUTPUT:      Intake/Output Summary (Last 24 hours) at 2020 1058  Last data filed at 2020 0900  Gross per 24 hour   Intake 5066 ml   Output 1611 ml   Net 3455 ml     WEIGHT :  Patient Vitals for the past 96 hrs (Last 3 readings):   Weight   20 170 lb (77.1 kg)   20 170 lb (77.1 kg)     PHYSICAL EXAM      GENERAL APPEARANCE: intubated , sedated   SKIN: warm and dry, no rash or erythema  EYES: sclera anicteric  ENT: ETT in place   NECK:   No carotid bruits and no carotid lymphadenopathy . PULMONARY: lungs are clear to auscultation anteriorly  . CADRDIOVASCULAR: S1 and S2 normal NO S3 and NO S4 . No rubs , no murmur. ABDOMEN: soft nontender, bowel sounds present, no organomegaly, no ascites.        EXTREMITIES: no cyanosis or edema     CURRENT MEDICATIONS      insulin lispro (HUMALOG) injection vial 0-12 Units, Q6H  midazolam (VERSED) 1 mg/mL in D5W infusion, Continuous  famotidine (PEPCID) injection 20 mg, BID  insulin glargine (LANTUS) injection vial 10 Units, Nightly  0.45 % sodium chloride infusion, Continuous  sodium chloride 50 mEq in sterile water infusion, Continuous  metoprolol (LOPRESSOR) injection 2.5 mg, Q6H PRN  hydrocortisone sodium succinate PF (SOLU-CORTEF) injection 50 mg, Q6H  norepinephrine (LEVOPHED) 16 mg in sodium chloride 0.9 % 250 mL infusion, Continuous  EPINEPHrine (EPINEPHrine HCL) 5 mg in dextrose 5 % 250 mL infusion, Continuous  piperacillin-tazobactam (ZOSYN) 3.375 g in dextrose 5 % 50 mL IVPB extended infusion (mini-bag), Q8H  levothyroxine (SYNTHROID) injection 50 mcg, Daily    And  sodium chloride (PF) 0.9 % injection 5 mL, Daily  sodium chloride flush 0.9 % injection 10 mL, 2 times per day  potassium chloride 20 mEq/50 mL IVPB (Central Line), PRN  sodium chloride flush 0.9 % injection 10 mL, 2 times per day  sodium chloride flush 0.9 % injection 10 mL, PRN  acetaminophen (TYLENOL) tablet 650 mg, Q6H PRN    Or  acetaminophen (TYLENOL) suppository 650 mg, Q6H PRN  polyethylene glycol (GLYCOLAX) packet 17 g, Daily PRN  promethazine (PHENERGAN) tablet 12.5 mg, Q6H PRN    Or  ondansetron (ZOFRAN) injection 4 mg, Q6H PRN  nicotine (NICODERM CQ) 21 MG/24HR 1 patch, Daily PRN  LORazepam (ATIVAN) injection 2 mg, Q4H PRN  diphenhydrAMINE (BENADRYL) injection 25 mg, Q6H PRN  aspirin EC tablet 81 mg, Daily  atorvastatin (LIPITOR) tablet 40 mg, Nightly  glucose (GLUTOSE) 40 % oral gel 15 g, PRN  dextrose 50 % IV solution, PRN  glucagon (rDNA) injection 1 mg, PRN  dextrose 5 % solution, PRN  levetiracetam (KEPPRA) 1000 mg/100 mL IVPB, Q12H          LABS      CBC:   Recent Labs     07/29/20  1324 07/30/20  0419 07/31/20  0400   WBC 10.5 11.8* 16.2*   RBC 4.98 4.40 4.05*   HGB 13.8 12.5* 11.1*   HCT 46.5 36.5* 33.4*   MCV 93.4 83.0 82.5*   MCH 27.7 28.4 27.4   MCHC 29.7 34.2 33.2   RDW 14.9* 14.9* 15.5*

## 2020-07-31 NOTE — FLOWSHEET NOTE
Writer called daughter Patrice Javier for a morning update and answered all questions to her satisfactory.        Electronically signed by Len Santiago RN on 7/31/2020 at 6:41 AM

## 2020-07-31 NOTE — PROGRESS NOTES
Jaquelin Cesar 18    Progress Note    7/31/2020    3:59 PM    Name:   Jorge Ortiz  MRN:     [id number]     Shanika Raycock:      [de-identified]   Room:   [address] [place] [address] Day:  4  Admit Date:  7/27/2020  8:06 PM    PCP:   No primary care provider on file. Code Status:  Full Code    Subjective:     C/C:   Chief Complaint   Patient presents with    Altered Mental Status     Interval History Status: Not changed  Patient seen and examined at bedside, no acute events overnight. Continue to be unresponsive to any kind of stimuli was almost no reflexes including gag reflex  Continue to be off sedation  Currently in sinus tachycardia   Sodium slightly elevated  Creatinine continue to increase, minimal urine output  Patient vitals, labs and all providers notes were reviewed,from overnight shift and morning updates were noted and discussed with the nurse    Brief History:     Jorge Ortiz is 60 y.o. male  Who was admitted to the hospital on 7/27/2020 for treatment of COVID-19 virus infection. Patient was brought to emergency room with altered mental status. He was at work and was found on the ground several hours later. Patient was confused, drowsy and not answering questions. He did not have any focal weakness. Evaluation in ER showed temperature 102. Lab evaluation showed low cortisol level, low TSH. COVID-19 test was positive. CT chest was negative for PE and did not show any Pneumonia. Neurology was consulted for possible TIA Vs syncope. CT head was negative for acute IC abnormality. Patient has history of pituitary adenoma S/P resection in 10/2016 at [facility] by Dr. Phoebe Weber. Patient had failed to follow-up with the clinic. Patient follows with Dr. Vince Tompkins MD.  It appears from outpatient note with PCP on 5/13/2020 that patient has not been taking his medications.   He has not been following his endocrinologist.  He has chronic pituitary insufficiency and was supposed to be on hydrocortisone, desmopressin, levothyroxine, testosterone and diabetic medications. He has lost 50 pounds and had stopped all medications. Patient was found to have  decrease cortisol and TSH levels. Patient was lethargic and confused. He had PEA arrest on 7/29/2020 with successful ROSC after couple rounds of CPR followed by new onset A. fib. Patient was given amiodarone 150 mg dose and converted normal sinus rhythm. He had more episodes of multiple PEA arrest successfully resuscitated with CPR. Patient had circulatory shock and needed intubation, pressor support with Levophed, epinephrine. Patient has severe anion gap metabolic acidosis  treated with bicarb bolus and infusion. Patient was started on IV Solu-Cortef, IV levothyroxine for pituitary insufficiency. Levophed was gradually weaned blood pressure improved. Review of Systems:     Unable to obtain as patient is intubated  Medications:      Allergies:  No Known Allergies    Current Meds:   Scheduled Meds:    [START ON 8/1/2020] famotidine (PEPCID) injection  20 mg Intravenous Daily    insulin lispro  0-12 Units Subcutaneous Q6H    insulin glargine  10 Units Subcutaneous Nightly    hydrocortisone sodium succinate PF  50 mg Intravenous Q6H    levothyroxine  50 mcg Intravenous Daily    And    sodium chloride (PF)  5 mL Intravenous Daily    sodium chloride flush  10 mL Intravenous 2 times per day    sodium chloride flush  10 mL Intravenous 2 times per day    aspirin  81 mg Oral Daily    atorvastatin  40 mg Oral Nightly    levetiracetam  1,000 mg Intravenous Q12H     Continuous Infusions:    midazolam Stopped (07/31/20 0745)    sodium chloride Stopped (07/30/20 1841)    IV infusion builder 100 mL/hr at 07/30/20 1820    norepinephrine Stopped (07/30/20 0445)    EPINEPHrine infusion Stopped (07/29/20 1910)    dextrose       PRN Meds: metoprolol, metoprolol, potassium chloride, sodium chloride flush, acetaminophen **OR** acetaminophen, polyethylene glycol, promethazine **OR** ondansetron, nicotine, LORazepam, diphenhydrAMINE, glucose, dextrose, glucagon (rDNA), dextrose    Data:     Past Medical History:   has a past medical history of Depression, ED (erectile dysfunction), Hypothyroid, Migraine, Pituitary tumor, and Type 2 diabetes mellitus ([address] Social History:   reports that he has never smoked. He has never used smokeless tobacco. He reports that he does not drink alcohol. Family History: History reviewed. No pertinent family history. Vitals:  /89   Pulse 128   Temp 97.7 °F (36.5 °C) (Core)   Resp 25   Ht 5' 10\" (1.778 m)   Wt 170 lb (77.1 kg)   SpO2 100%   BMI 24.39 kg/m²   Temp (24hrs), Av.5 °F (37.5 °C), Min:97.7 °F (36.5 °C), Max:100.8 °F (38.2 °C)    Recent Labs     20  1817 20  0031 20  0749 20  1237   POCGLU 246* 176* 140* 136*       I/O (24Hr):     Intake/Output Summary (Last 24 hours) at 2020 1559  Last data filed at 2020 1200  Gross per 24 hour   Intake 5066 ml   Output 1471 ml   Net 3595 ml       Labs:  Hematology:  Recent Labs     20  0312 20  1324 20  0419 20  0400   WBC  --  10.5 11.8* 16.2*   RBC  --  4.98 4.40 4.05*   HGB  --  13.8 12.5* 11.1*   HCT  --  46.5 36.5* 33.4*   MCV  --  93.4 83.0 82.5*   MCH  --  27.7 28.4 27.4   MCHC  --  29.7 34.2 33.2   RDW  --  14.9* 14.9* 15.5*   PLT  --  208 See Reflexed IPF Result See Reflexed IPF Result   MPV  --  10.1 NOT REPORTED NOT REPORTED   DDIMER 0.71  --  58.48 24.85     Chemistry:  Recent Labs     20  2230  20  1324  20  1522  20  0419 20  0940  20  0400 20  0855 20  1520   NA  --    < > 150*  --  149*   < > 147* 145*   < > 145* 147* 146*   K  --    < > 6.4*  --  3.5*   < > 2.1* 3.6*   < > 4.5 4.3 4.4   CL  --    < > 117*  --  118*   < > 111* 110*   < > 108* 109* 107   CO2  --    < > 7*  --  8*   < > 14* 20   < > 21 communication center. Us Renal Complete    Result Date: 7/30/2020  Increased cortical echogenicity consistent with medical renal disease. No hydronephrosis or nephrolithiasis. Increased fluid in the bowel. Xr Chest Portable    Result Date: 7/29/2020  Right IJ central line appears in satisfactory position without evidence of pneumothorax. Endotracheal tube also appears in satisfactory position. Ct Chest Pulmonary Embolism W Contrast    Result Date: 7/27/2020  No evidence of pulmonary embolism or acute pulmonary abnormality. Cta Head Neck W Contrast    Result Date: 7/27/2020  No large vessel occlusion or hemodynamic stenosis Multilevel degenerate changes cervical spine. Physical Examination:        General appearance: intubated and unresponsive off sedation   Mental Status:  Unable to assess   Lungs:  diminished bilaterally, normal effort  Heart:  regular rate and rhythm, no murmur  Abdomen:  soft, nontender, nondistended, sluggish  bowel sounds, no masses, hepatomegaly, splenomegaly  Extremities:  no edema, redness, tenderness in the calves  Skin:  no gross lesions, rashes, induration    Assessment:        Hospital Problems           Last Modified POA    * (Principal) COVID-19 virus infection 7/28/2020 Yes    Toxic metabolic encephalopathy 3/01/3585 Yes    Secondary hypothyroidism 7/29/2020 Yes    History of pituitary tumor 7/28/2020 Yes    Seizure ([address] 7/28/2020 Yes    Somnolence 7/28/2020 Yes    Shock circulatory ([address] 7/29/2020 Yes    Pituitary insufficiency ([address] 7/29/2020 Yes    High anion gap metabolic acidosis 0/47/0408 Yes    New onset atrial fibrillation ([address] 7/29/2020 Yes    Pulseless electrical activity ([address] 7/29/2020 Yes    LEILA (acute kidney injury) ([address] 7/31/2020 Yes          Plan:         I reviewed hospital course including labs, images and notes    Unresponsiveness/acute metabolic encephalopathy: Unclear etiology, possible seizure ?   Patient is on a AED per neurology  Patient on admission was initially lethargic but after arrest he became unresponsive off sedation, MRI to be done today  Discussed with neurology attending, await MRI result along with repeat EEG as last LTM E was done prior to arrest    PEA cardiac arrest: On 7/29, patient arrested x5 with successful risk every time, etiology is unclear, seizure and hypoxia? Cardiology consulted    Acute hypoxemic respiratory failure: Patient is currently ventilated  , Management per pulmonology. Circulatory shock: Needed pressors and fluids, currently off pressors    Acute kidney injury: Likely ATN from hypovolemia and contrast exposure, creatinine continue to worsen, discussed with nephrology Dr. Alethea Gutierrez today  Patient might need dialysis but given his overall condition  Decision will be based on his meaningful neurological recovery    Hyponatremia: Fluid switch per nephrology    Anion gap metabolic acidosis: Likely secondary to his acute kidney injury needed bicarb infusion    Pituitary cancer?   Status post resection in 2016    DM2: Continue diabetes home meds, insulin sliding scale, hypoglycemia protocol, will follow    Thrombocytopenia: DVT prophylaxis with EPC only       Spent 40 minutes  in examining, assessing and adjusting treatment / adding work up and discussing the plane with  with staff and specialists     Will wait till I have formal idea from neurology team regarding his prognosis then will update the family , no assigned POA but his daughter Iwona Rogers is listed as emergency contact , spiritual care is involved      Singh Patel MD  7/31/2020  3:59 PM

## 2020-07-31 NOTE — PROGRESS NOTES
positive and the patient was admitted to the [facility] unit. The patient does have a history of a pituitary tumor which was resected 2 years ago. The patient is suspected to have had a seizure and I was asked to evaluate for the COVID-19 infection. The patient does not recall the events of what happened and does not able to get much out of him today. The EEG showed diffuse polymorphic delta theta slowing suggestive of moderate to severe encephalopathy and frontal dominant rhythm delta activity concerning for subclinical seizure. On 2020 the patient had clinical deterioration with a PEA arrest and after successful resuscitation he had subsequent PEA and asystolic arrest requiring ACLS protocol. The patient was intubated started on Levophed for blood pressure support and was transferred to the intensive care unit    Current evaluation:2020    BP (!) 144/92   Pulse 121   Temp 97.7 °F (36.5 °C) (Core)   Resp 22   Ht 5' 10\" (1.778 m)   Wt 170 lb (77.1 kg)   SpO2 (!) 82%   BMI 24.39 kg/m²     Temperature Range: Temp: 97.7 °F (36.5 °C) Temp  Av.5 °F (37.5 °C)  Min: 97.7 °F (36.5 °C)  Max: 100.8 °F (38.2 °C)  The patient is seen and evaluated at bedside and the care was discussed with the nurse. He remains intubated on the ventilator he is on Versed for sedation as he does get tachypneic without the Versed. The patient remains unresponsive and has flaccid paralysis    Review of Systems   Unable to perform ROS: Intubated       Physical Examination :     Physical Exam  Constitutional:       Appearance: He is well-developed. Interventions: He is sedated and intubated. HENT:      Head: Normocephalic and atraumatic. Eyes:      Comments: There is some chemosis  Gaze is fixed upward to the right  There is some nystagmus noted   Neck:      Musculoskeletal: Normal range of motion and neck supple. Cardiovascular:      Rate and Rhythm: Normal rate. Heart sounds: Normal heart sounds. Pulmonary:      Effort: Pulmonary effort is normal. He is intubated. Breath sounds: Normal breath sounds. Abdominal:      General: Bowel sounds are normal.      Palpations: Abdomen is soft. Skin:     General: Skin is warm and dry. Neurological:      Comments: The patient is unresponsive and has flaccid paralysis         Laboratory data:   I have independently reviewed the followinglabs:  CBC with Differential:   Recent Labs     07/29/20  1324 07/30/20  0419 07/31/20  0400   WBC 10.5 11.8* 16.2*   HGB 13.8 12.5* 11.1*   HCT 46.5 36.5* 33.4*    See Reflexed IPF Result See Reflexed IPF Result   LYMPHOPCT 61*  --   --    MONOPCT 7  --   --      BMP:   Recent Labs     07/30/20  0419 07/30/20  0940 07/30/20  2114 07/31/20  0400   * 145* 148* 145*   K 2.1* 3.6* 4.5 4.5   * 110* 109* 108*   CO2 14* 20 21 21   BUN 32* 33*  --  41*   CREATININE 3.67* 3.69*  --  4.68*   MG 1.7 1.5*  --   --      Hepatic Function Panel:   Recent Labs     07/30/20  1643   PROT 4.3*     No results for input(s): VANCOTROUGH in the last 72 hours. No results found for: CRP  No results found for: SEDRATE    No results for input(s): PROCAL in the last 72 hours. D-Dimer, Quant  24.85  mg/L FEU  Final  07/31/2020  4:00 AM  [address]      Fibrinogen  415  140 - 420 mg/dL  Final  07/31/2020  4:00 AM  [address]        Lactic Acid, Whole Blood  5. 4High    0.7 - 2.1 mmol/L  Final  07/30/2020  9:40 AM  [address]      -Dimer, Quant  58.48  mg/L FEU  Final  07/30/2020  4:19 AM  [address]      Fibrinogen  291  140 - 420 mg/dL  Final  07/30/2020  4:19 AM  [address]        Lactic Acid, Whole Blood  10. 3High    0.7 - 2.1 mmol/L  Final  07/29/2020  1:24 PM  [address]        Imaging Studies:   MRI of the brain is pending      ONE XRAY VIEW OF THE CHEST 7/30/2020 6:12 pm  Impression    Satisfactory position of the lines and Heat-labile and heat-stable (LT/ST) DNA Detected     Yersinia Enterocolitica PCR  NEGATIVE: No Yersinia enterocolitica DNA Detected    C DIFF TOXIN/ANTIGEN [[id number]]   Collected: 07/28/20 1220    Order Status: Completed  Specimen: Stool  Updated: 07/29/20 0850     Specimen Description  . FECES     C DIFF AG + TOXIN  NEGATIVE     Comment:  No C. difficile antigen and Toxin Detected. Culture, Urine [[id number]]   Collected: 07/27/20 2154    Order Status: Completed  Specimen: Urine, clean catch  Updated: 07/28/20 2112     Specimen Description  . CLEAN CATCH URINE     Special Requests  NOT REPORTED     Culture  NO SIGNIFICANT GROWTH        Medications:      insulin lispro  0-12 Units Subcutaneous Q6H    famotidine (PEPCID) injection  20 mg Intravenous BID    insulin glargine  10 Units Subcutaneous Nightly    hydrocortisone sodium succinate PF  50 mg Intravenous Q6H    piperacillin-tazobactam  3.375 g Intravenous Q8H    levothyroxine  50 mcg Intravenous Daily    And    sodium chloride (PF)  5 mL Intravenous Daily    sodium chloride flush  10 mL Intravenous 2 times per day    sodium chloride flush  10 mL Intravenous 2 times per day    aspirin  81 mg Oral Daily    atorvastatin  40 mg Oral Nightly    levetiracetam  1,000 mg Intravenous Q12H           [facility]  Lisandra Oliva MD  [employer] messaging  OFFICE: [phone]      Electronically signed by Lisandra Oliva MD on 7/31/2020 at 8:58 AM  Thank you for allowing us to participate in the care of this patient. Please call with questions. This note iscreated with the assistance of a speech recognition program.  While intending to generate a document that actually reflects the content of the visit, the document can still have some errors including those of syntax andsound a like substitutions which may escape proof reading. In such instances, actual meaning can be extrapolated by contextual diversion.

## 2020-07-31 NOTE — PLAN OF CARE
Problem: Airway Clearance - Ineffective  Goal: Achieve or maintain patent airway  Outcome: Ongoing     Problem: Gas Exchange - Impaired  Goal: Absence of hypoxia  Outcome: Ongoing  Goal: Promote optimal lung function  Outcome: Ongoing     Problem: Breathing Pattern - Ineffective  Goal: Ability to achieve and maintain a regular respiratory rate  Outcome: Ongoing     Problem:  Body Temperature -  Risk of, Imbalanced  Goal: Ability to maintain a body temperature within defined limits  Outcome: Ongoing  Goal: Will regain or maintain usual level of consciousness  Outcome: Ongoing  Goal: Complications related to the disease process, condition or treatment will be avoided or minimized  Outcome: Ongoing     Problem: Isolation Precautions - Risk of Spread of Infection  Goal: Prevent transmission of infection  Outcome: Ongoing     Problem: Nutrition Deficits  Goal: Optimize nutrtional status  Outcome: Ongoing     Problem: Risk for Fluid Volume Deficit  Goal: Maintain normal heart rhythm  Outcome: Ongoing  Goal: Maintain absence of muscle cramping  Outcome: Ongoing  Goal: Maintain normal serum potassium, sodium, calcium, phosphorus, and pH  Outcome: Ongoing     Problem: Loneliness or Risk for Loneliness  Goal: Demonstrate positive use of time alone when socialization is not possible  Outcome: Ongoing     Problem: Fatigue  Goal: Verbalize increase energy and improved vitality  Outcome: Ongoing     Problem: Patient Education: Go to Patient Education Activity  Goal: Patient/Family Education  Outcome: Ongoing     Problem: Skin Integrity:  Goal: Will show no infection signs and symptoms  Description: Will show no infection signs and symptoms  Outcome: Ongoing  Goal: Absence of new skin breakdown  Description: Absence of new skin breakdown  Outcome: Ongoing     Problem: Falls - Risk of:  Goal: Will remain free from falls  Description: Will remain free from falls  Outcome: Ongoing  Goal: Absence of physical injury  Description: Absence of physical injury  Outcome: Ongoing     Problem: Confusion - Acute:  Goal: Absence of continued neurological deterioration signs and symptoms  Description: Absence of continued neurological deterioration signs and symptoms  Outcome: Ongoing  Goal: Mental status will be restored to baseline  Description: Mental status will be restored to baseline  Outcome: Ongoing     Problem: Discharge Planning:  Goal: Ability to perform activities of daily living will improve  Description: Ability to perform activities of daily living will improve  Outcome: Ongoing  Goal: Participates in care planning  Description: Participates in care planning  Outcome: Ongoing     Problem: Injury - Risk of, Physical Injury:  Goal: Will remain free from falls  Description: Will remain free from falls  Outcome: Ongoing  Goal: Absence of physical injury  Description: Absence of physical injury  Outcome: Ongoing     Problem: Mood - Altered:  Goal: Mood stable  Description: Mood stable  Outcome: Ongoing  Goal: Absence of abusive behavior  Description: Absence of abusive behavior  Outcome: Ongoing  Goal: Verbalizations of feeling emotionally comfortable while being cared for will increase  Description: Verbalizations of feeling emotionally comfortable while being cared for will increase  Outcome: Ongoing     Problem: Psychomotor Activity - Altered:  Goal: Absence of psychomotor disturbance signs and symptoms  Description: Absence of psychomotor disturbance signs and symptoms  Outcome: Ongoing     Problem: Sensory Perception - Impaired:  Goal: Demonstrations of improved sensory functioning will increase  Description: Demonstrations of improved sensory functioning will increase  Outcome: Ongoing  Goal: Decrease in sensory misperception frequency  Description: Decrease in sensory misperception frequency  Outcome: Ongoing  Goal: Able to refrain from responding to false sensory perceptions  Description: Able to refrain from responding to false sensory perceptions  Outcome: Ongoing  Goal: Demonstrates accurate environmental perceptions  Description: Demonstrates accurate environmental perceptions  Outcome: Ongoing  Goal: Able to distinguish between reality-based and nonreality-based thinking  Description: Able to distinguish between reality-based and nonreality-based thinking  Outcome: Ongoing  Goal: Able to interrupt nonreality-based thinking  Description: Able to interrupt nonreality-based thinking  Outcome: Ongoing     Problem: Sleep Pattern Disturbance:  Goal: Appears well-rested  Description: Appears well-rested  Outcome: Ongoing     Problem: Restraint Use - Nonviolent/Non-Self-Destructive Behavior:  Goal: Absence of restraint indications  Description: Absence of restraint indications  Outcome: Met This Shift  Goal: Absence of restraint-related injury  Description: Absence of restraint-related injury  Outcome: Met This Shift     Problem: HEMODYNAMIC STATUS  Goal: Patient has stable vital signs and fluid balance  Outcome: Ongoing     Problem: SKIN INTEGRITY  Goal: Skin integrity is maintained or improved  Outcome: Ongoing     Problem: NUTRITION  Goal: Nutritional status is improving  Outcome: Ongoing

## 2020-07-31 NOTE — CONSULTS
mellitus ([facility] [address] Past Surgical History:   has a past surgical history that includes Pituitary excision (10/05/2016).      Home Medications:    Prior to Admission medications    Not on File      Current Facility-Administered Medications: insulin lispro (HUMALOG) injection vial 0-12 Units, 0-12 Units, Subcutaneous, Q6H  midazolam (VERSED) 1 mg/mL in D5W infusion, 1 mg/hr, Intravenous, Continuous  famotidine (PEPCID) injection 20 mg, 20 mg, Intravenous, BID  insulin glargine (LANTUS) injection vial 10 Units, 10 Units, Subcutaneous, Nightly  0.45 % sodium chloride infusion, , Intravenous, Continuous  sodium chloride 50 mEq in sterile water infusion, , Intravenous, Continuous  metoprolol (LOPRESSOR) injection 2.5 mg, 2.5 mg, Intravenous, Q6H PRN  hydrocortisone sodium succinate PF (SOLU-CORTEF) injection 50 mg, 50 mg, Intravenous, Q6H  norepinephrine (LEVOPHED) 16 mg in sodium chloride 0.9 % 250 mL infusion, 2 mcg/min, Intravenous, Continuous  EPINEPHrine (EPINEPHrine HCL) 5 mg in dextrose 5 % 250 mL infusion, 1 mcg/min, Intravenous, Continuous  piperacillin-tazobactam (ZOSYN) 3.375 g in dextrose 5 % 50 mL IVPB extended infusion (mini-bag), 3.375 g, Intravenous, Q8H  levothyroxine (SYNTHROID) injection 50 mcg, 50 mcg, Intravenous, Daily **AND** sodium chloride (PF) 0.9 % injection 5 mL, 5 mL, Intravenous, Daily  sodium chloride flush 0.9 % injection 10 mL, 10 mL, Intravenous, 2 times per day  potassium chloride 20 mEq/50 mL IVPB (Central Line), 20 mEq, Intravenous, PRN  sodium chloride flush 0.9 % injection 10 mL, 10 mL, Intravenous, 2 times per day  sodium chloride flush 0.9 % injection 10 mL, 10 mL, Intravenous, PRN  acetaminophen (TYLENOL) tablet 650 mg, 650 mg, Oral, Q6H PRN **OR** acetaminophen (TYLENOL) suppository 650 mg, 650 mg, Rectal, Q6H PRN  polyethylene glycol (GLYCOLAX) packet 17 g, 17 g, Oral, Daily PRN  promethazine (PHENERGAN) tablet 12.5 mg, 12.5 mg, Oral, Q6H PRN **OR** ondansetron (ZOFRAN) injection 4 mg, 4 mg, Intravenous, Q6H PRN  nicotine (NICODERM CQ) 21 MG/24HR 1 patch, 1 patch, Transdermal, Daily PRN  LORazepam (ATIVAN) injection 2 mg, 2 mg, Intravenous, Q4H PRN  diphenhydrAMINE (BENADRYL) injection 25 mg, 25 mg, Intravenous, Q6H PRN  aspirin EC tablet 81 mg, 81 mg, Oral, Daily  atorvastatin (LIPITOR) tablet 40 mg, 40 mg, Oral, Nightly  glucose (GLUTOSE) 40 % oral gel 15 g, 15 g, Oral, PRN  dextrose 50 % IV solution, 12.5 g, Intravenous, PRN  glucagon (rDNA) injection 1 mg, 1 mg, Intramuscular, PRN  dextrose 5 % solution, 100 mL/hr, Intravenous, PRN  levetiracetam (KEPPRA) 1000 mg/100 mL IVPB, 1,000 mg, Intravenous, Q12H    Allergies:  Patient has no known allergies. Social History:   reports that he has never smoked. He has never used smokeless tobacco. He reports that he does not drink alcohol. Family History: family history is not on file. No h/o sudden cardiac death. No for premature CAD    REVIEW OF SYSTEMS:  Unable to assess as patient is intubated and COVID-19 positive. · Constitutional: there has been no unanticipated weight loss. There's been No change in energy level, No change in activity level. · Eyes: No visual changes or diplopia. No scleral icterus. · ENT: No Headaches  · Cardiovascular: No cardiac history  · Respiratory: No previous pulmonary problems, No cough  · Gastrointestinal: No abdominal pain. No change in bowel or bladder habits. · Genitourinary: No dysuria, trouble voiding, or hematuria. · Musculoskeletal:  No gait disturbance, No weakness or joint complaints. · Integumentary: No rash or pruritis. · Neurological: No headache, diplopia, change in muscle strength, numbness or tingling. No change in gait, balance, coordination, mood, affect, memory, mentation, behavior. · Psychiatric: No anxiety, or depression. · Endocrine: No temperature intolerance. No excessive thirst, fluid intake, or urination. No tremor.   · Hematologic/Lymphatic: No abnormal bruising or Currently off pressors. 2. New Onset Atrial Fibrillation after Arrest.   3. Recommend Magnesium > 2.0, Potassium > 4.0.   4. Troponin 7/29 - 49  5. EKG with ST depression in anterior lateral leads, T wave inversions. 6. Will follow neurological recovery   7. Plan to be discussed with attending. Discussed with Nurse. Electronically signed by Noemi Rodriguez DO on 7/31/2020 at 8:18 AM    [facility]             Attending Physician Statement  I have discussed the care of Abbi Gordillo, including pertinent history and exam findings,  with the cardiology fellow/resident. I have seen and examined the patient and the key elements of all parts of the encounter have been performed by me.           Taniya Erickson MD, [facility] [place]

## 2020-07-31 NOTE — PROGRESS NOTES
77.1 77.1 77.1 77.1     Wt Readings from Last 3 Encounters:   07/27/20 170 lb (77.1 kg)     Body mass index is 24.39 kg/m².         PHYSICAL EXAM:  Lungs clear   Unresponsive   No resp to deep pain   Pupils non reactive       MEDICATIONS:  Scheduled Meds:   [START ON 8/1/2020] famotidine (PEPCID) injection  20 mg Intravenous Daily    insulin lispro  0-12 Units Subcutaneous Q6H    insulin glargine  10 Units Subcutaneous Nightly    hydrocortisone sodium succinate PF  50 mg Intravenous Q6H    levothyroxine  50 mcg Intravenous Daily    And    sodium chloride (PF)  5 mL Intravenous Daily    sodium chloride flush  10 mL Intravenous 2 times per day    sodium chloride flush  10 mL Intravenous 2 times per day    aspirin  81 mg Oral Daily    atorvastatin  40 mg Oral Nightly    levetiracetam  1,000 mg Intravenous Q12H     Continuous Infusions:   midazolam Stopped (07/31/20 1450)    sodium chloride Stopped (07/30/20 1841)    IV infusion builder 100 mL/hr at 07/31/20 1615    norepinephrine Stopped (07/30/20 0445)    EPINEPHrine infusion Stopped (07/29/20 1910)    dextrose       PRN Meds:   metoprolol, 5 mg, Q4H PRN  metoprolol, 2.5 mg, Q6H PRN  potassium chloride, 20 mEq, PRN  sodium chloride flush, 10 mL, PRN  acetaminophen, 650 mg, Q6H PRN    Or  acetaminophen, 650 mg, Q6H PRN  polyethylene glycol, 17 g, Daily PRN  promethazine, 12.5 mg, Q6H PRN    Or  ondansetron, 4 mg, Q6H PRN  nicotine, 1 patch, Daily PRN  LORazepam, 2 mg, Q4H PRN  diphenhydrAMINE, 25 mg, Q6H PRN  glucose, 15 g, PRN  dextrose, 12.5 g, PRN  glucagon (rDNA), 1 mg, PRN  dextrose, 100 mL/hr, PRN        SUPPORT DEVICES: [x] Ventilator [] BIPAP  [] Nasal Cannula [] Room Air    VENT SETTINGS (Comprehensive) (if applicable):    Vent Information  $Ventilation: $Subsequent Day  Skin Assessment: Clean, dry, & intact  Suction Catheter Diameter: 14  Equipment ID: [id number]  Vent Type: Servo i  Vent Mode: PRVC  Vt Ordered: 460 mL  Rate Set: 18 bmp  FiO2 : 30 07/30/2020    MG 1.7 07/30/2020     Phosphorus:   Lab Results   Component Value Date    PHOS 2.3 07/30/2020     Ionized Calcium: No results found for: CAION     Urinalysis:   Lab Results   Component Value Date    NITRU NEGATIVE 07/27/2020    COLORU YELLOW 07/27/2020    PHUR 5.0 07/27/2020    WBCUA None 07/27/2020    RBCUA 0 TO 2 07/27/2020    MUCUS NOT REPORTED 07/27/2020    TRICHOMONAS NOT REPORTED 07/27/2020    YEAST NOT REPORTED 07/27/2020    BACTERIA NOT REPORTED 07/27/2020    SPECGRAV 1.052 07/27/2020    LEUKOCYTESUR NEGATIVE 07/27/2020    UROBILINOGEN Normal 07/27/2020    BILIRUBINUR NEGATIVE 07/27/2020    GLUCOSEU NEGATIVE 07/27/2020    KETUA NEGATIVE 07/30/2020    AMORPHOUS NOT REPORTED 07/27/2020       HgBA1c:    Lab Results   Component Value Date    LABA1C 5.3 07/28/2020     TSH:    Lab Results   Component Value Date    TSH <0.01 07/28/2020       Ct Head Wo Contrast    Result Date: 7/27/2020  No acute intracranial abnormality. Findings were communicated electronically to Dr. Carmelina Montes through the radiology results communication center. Xr Chest Portable    Result Date: 7/29/2020  Right IJ central line appears in satisfactory position without evidence of pneumothorax. Endotracheal tube also appears in satisfactory position. Ct Chest Pulmonary Embolism W Contrast    Result Date: 7/27/2020  No evidence of pulmonary embolism or acute pulmonary abnormality. Cta Head Neck W Contrast    Result Date: 7/27/2020  No large vessel occlusion or hemodynamic stenosis Multilevel degenerate changes cervical spine.         ASSESSMENT:     Acute hypoxic respiratory failure due to covid -19 pneumonia   Metabolic acidosis ag   Lactic acidosis   PEA cardiac arrest   Anoxic brain injury   Shock - adrenal better   LEILA   Hypokalemia     COVID-19 virus infection    Secondary hypothyroidism    History of pituitary tumor    Seizure (HCC)    Somnolence    Pituitary insufficiency (HCC)    High anion gap metabolic acidosis    New

## 2020-07-31 NOTE — CARE COORDINATION
TRANSITIONAL CARE PLANNING/ 2 Rehab J[facility]on Day: 4    Reason for Admission: AMS (altered mental status) [R41.82] covid    Treatment Plan of Care: full code, intubated fio2 30%    Tests/Procedures: MRI brain to evalute possible anoxia    Readmission Risk            Risk of Unplanned Readmission:      21            Patient goals/Treatment Preferences/Transitional Plan: guarded prognosis

## 2020-07-31 NOTE — PROGRESS NOTES
Patient has put out 30ml of urine since 80 of lasix. Dr. Morena Lockhart called and notified. No orders given at this time.

## 2020-07-31 NOTE — FLOWSHEET NOTE
07/31/20 6750   Provider Notification   Reason for Communication Critical Value (comment)  (low U.O, Cret and BUN results )   Provider Name Dr. Alisia Castro    Provider Notification Physician   Method of Communication Call   Response No new orders   Notification Time [phone]     Paged the on call nephrologist Dr. Alisia Castro to inform him of low urine output and and increase in Creatinine and BUN levels. No orders. Will evaluate with rounds. Continue to monitor.        Electronically signed by Lanna Gosselin, RN on 7/31/2020 at 6:29 AM

## 2020-07-31 NOTE — PROGRESS NOTES
Spoke with daughter Carina Bajwa and updated on day. Carina Bajwa was able to provide RN with 6 other siblings names and phone numbers. According to Carina Bajwa she was unsure of exact number of biologically related siblings, and all other siblings are either under the age of 25 or estranged from the family. 1. Lizett Montenegro (son) [phone]  2. Renita Le (son) [phone]  3. Kalpana Allen (son) [phone]  4. Ayad Dee (daughter) [phone]  5. Beto Wilburn (son) [phone]  6.  Rhea Lee (son) [phone]

## 2020-07-31 NOTE — PROGRESS NOTES
hour(s))   [place]/Lambda Free Lt Chains, Serum Quant    Collection Time: 07/30/20  4:43 PM   Result Value Ref Range    Kappa Free Light Chains QNT 11.12 (H) 0.37 - 1.94 mg/dL    Lambda Free Light Chains QNT 5.40 (H) 0.57 - 2.63 mg/dL    Free Kappa/Lambda Ratio 2.06 (H) 0.26 - 1.65   Electrophoresis Protein, Serum without Reflex to Immunofixation    Collection Time: 07/30/20  4:43 PM   Result Value Ref Range    Total Protein 4.3 (L) 6.4 - 8.3 g/dL    Albumin (calculated) PENDING g/dL    Albumin % PENDING %    Alpha-1-Globulin PENDING g/dL    Alpha 1 % PENDING %    Alpha-2-Globulin PENDING g/dL    Alpha 2 % PENDING %    Beta Globulin PENDING g/dL    Beta Percent PENDING %    Gamma Globulin PENDING g/dL    Gamma Globulin % PENDING %    Total Prot. Sum PENDING g/dL    Total Prot.  Sum,% PENDING %    Protein Electrophoresis, Serum PENDING     Pathologist PENDING    C3 Complement    Collection Time: 07/30/20  4:43 PM   Result Value Ref Range    Complement C3 70 (L) 90 - 180 mg/dL   C4 Complement    Collection Time: 07/30/20  4:43 PM   Result Value Ref Range    Complement C4 14 10 - 40 mg/dL   JOEL    Collection Time: 07/30/20  4:43 PM   Result Value Ref Range    JOEL NEGATIVE NEGATIVE   Protein, urine, random    Collection Time: 07/30/20  4:43 PM   Result Value Ref Range    Total Protein, Urine 127 mg/dL   Creatinine, Random Urine    Collection Time: 07/30/20  4:43 PM   Result Value Ref Range    Creatinine, Ur 24.4 (L) 39.0 - 259.0 mg/dL   Eosinophils, Urine    Collection Time: 07/30/20  4:43 PM   Result Value Ref Range    Eosinophil, Ur NONE SEEN NONE SEEN   Ketones, urine    Collection Time: 07/30/20  4:43 PM   Result Value Ref Range    Ketones, Urine NEGATIVE NEGATIVE   POC Glucose Fingerstick    Collection Time: 07/30/20  6:17 PM   Result Value Ref Range    POC Glucose 246 (H) 75 - 110 mg/dL   ELECTROLYTE PANEL    Collection Time: 07/30/20  9:14 PM   Result Value Ref Range    Sodium 148 (H) 135 - 144 mmol/L    Potassium 4. 5 3.7 - 5.3 mmol/L    Chloride 109 (H) 98 - 107 mmol/L    CO2 21 20 - 31 mmol/L    Anion Gap 18 (H) 9 - 17 mmol/L   POC Glucose Fingerstick    Collection Time: 07/31/20 12:31 AM   Result Value Ref Range    POC Glucose 176 (H) 75 - 110 mg/dL   Arterial Blood Gas, POC    Collection Time: 07/31/20  3:39 AM   Result Value Ref Range    POC pH 7.543 (H) 7.350 - 7.450    POC pCO2 28.1 (L) 35.0 - 48.0 mm Hg    POC PO2 96.7 83.0 - 108.0 mm Hg    POC HCO3 24.2 21.0 - 28.0 mmol/L    TCO2 (calc), Art 25 22.0 - 29.0 mmol/L    Negative Base Excess, Art NOT REPORTED 0.0 - 2.0    Positive Base Excess, Art 2 0.0 - 3.0    POC O2 SAT 98 94.0 - 98.0 %    O2 Device/Flow/% Adult Ventilator     [place] Test NOT APPLICABLE     Sample Site Arterial Line     Mode PRVC     FIO2 30.0     Pt Temp 37.3     POC pH Temp 7.54     POC pCO2 Temp 29 mm Hg    POC pO2 Temp 98 mm Hg   APTT    Collection Time: 07/31/20  4:00 AM   Result Value Ref Range    PTT 35.4 (H) 20.5 - 30.5 sec   Fibrinogen    Collection Time: 07/31/20  4:00 AM   Result Value Ref Range    Fibrinogen 415 140 - 420 mg/dL   D-Dimer, Quantitative    Collection Time: 07/31/20  4:00 AM   Result Value Ref Range    D-Dimer, Quant 24.85 mg/L FEU   Basic Metabolic Panel    Collection Time: 07/31/20  4:00 AM   Result Value Ref Range    Glucose 156 (H) 70 - 99 mg/dL    BUN 41 (H) 8 - 23 mg/dL    CREATININE 4.68 (H) 0.70 - 1.20 mg/dL    Bun/Cre Ratio NOT REPORTED 9 - 20    Calcium 7.4 (L) 8.6 - 10.4 mg/dL    Sodium 145 (H) 135 - 144 mmol/L    Potassium 4.5 3.7 - 5.3 mmol/L    Chloride 108 (H) 98 - 107 mmol/L    CO2 21 20 - 31 mmol/L    Anion Gap 16 9 - 17 mmol/L    GFR Non-African American 13 (L) >60 mL/min    GFR  16 (L) >60 mL/min    GFR Comment          GFR Staging NOT REPORTED    CBC    Collection Time: 07/31/20  4:00 AM   Result Value Ref Range    WBC 16.2 (H) 3.5 - 11.3 k/uL    RBC 4.05 (L) 4.21 - 5.77 m/uL    Hemoglobin 11.1 (L) 13.0 - 17.0 g/dL    Hematocrit 33.4 (L) 40.7 - 50.3 %    MCV 82.5 (L) 82.6 - 102.9 fL    MCH 27.4 25.2 - 33.5 pg    MCHC 33.2 28.4 - 34.8 g/dL    RDW 15.5 (H) 11.8 - 14.4 %    Platelets See Reflexed IPF Result 138 - 453 k/uL    MPV NOT REPORTED 8.1 - 13.5 fL    NRBC Automated 0.4 (H) 0.0 per 100 WBC   Immature Platelet Fraction    Collection Time: 07/31/20  4:00 AM   Result Value Ref Range    Platelet, Immature Fraction 5.4 1.1 - 10.3 %    Platelet, Fluorescence 47 (L) 138 - 453 k/uL   POC Glucose Fingerstick    Collection Time: 07/31/20  7:49 AM   Result Value Ref Range    POC Glucose 140 (H) 75 - 110 mg/dL   ELECTROLYTE PANEL    Collection Time: 07/31/20  8:55 AM   Result Value Ref Range    Sodium 147 (H) 135 - 144 mmol/L    Potassium 4.3 3.7 - 5.3 mmol/L    Chloride 109 (H) 98 - 107 mmol/L    CO2 22 20 - 31 mmol/L    Anion Gap 16 9 - 17 mmol/L   POC Glucose Fingerstick    Collection Time: 07/31/20 12:37 PM   Result Value Ref Range    POC Glucose 136 (H) 75 - 110 mg/dL       Imaging/Diagnostics:  Xr Chest (single View Frontal)    Result Date: 7/30/2020  EXAMINATION: ONE XRAY VIEW OF THE CHEST 7/30/2020 6:12 pm COMPARISON: 07/28/2020 HISTORY: ORDERING SYSTEM PROVIDED HISTORY: CHF TECHNOLOGIST PROVIDED HISTORY: CHF Reason for Exam: sup FINDINGS: Enteric tube 4 cm above the shaun. Enteric tube tip left upper quadrant within the stomach. Satisfactory position. Right IJ catheter distal tip the level cavoatrial junction satisfactory position. Heart is enlarged. Perihilar congestion and perihilar edema. Bibasilar atelectasis. No pleural effusion pneumothorax. Satisfactory position of the lines and catheters. Cardiomegaly with perihilar edema.      Ct Head Wo Contrast    Result Date: 7/27/2020  EXAMINATION: CT OF THE HEAD WITHOUT CONTRAST  7/27/2020 8:46 pm TECHNIQUE: CT of the head was performed without the administration of intravenous contrast. Dose modulation, iterative reconstruction, and/or weight based adjustment of the mA/kV was utilized to reduce the radiation dose to as low as reasonably achievable. COMPARISON: None. HISTORY: ORDERING SYSTEM PROVIDED HISTORY: AMS TECHNOLOGIST PROVIDED HISTORY: AMS Reason for Exam: AMS Acuity: Acute Type of Exam: Initial FINDINGS: BRAIN/VENTRICLES: There is no acute intracranial hemorrhage, mass effect or midline shift. No abnormal extra-axial fluid collection. The gray-white differentiation is maintained without evidence of an acute infarct. There is no evidence of hydrocephalus. Suspect minimal encephalomalacia left posterior parietal region. Minor periventricular and subcortical white matter hypoattenuation suggestive chronic microvascular disease. Vascular calcifications. ORBITS: The visualized portion of the orbits demonstrate no acute abnormality. SINUSES: The visualized paranasal sinuses and mastoid air cells demonstrate no acute abnormality. SOFT TISSUES/SKULL:  No acute abnormality of the visualized skull or soft tissues. No acute intracranial abnormality. Findings were communicated electronically to Dr. Armani Cerna through the radiology results communication center. Us Renal Complete    Result Date: 7/30/2020  EXAMINATION: RETROPERITONEAL ULTRASOUND OF THE KIDNEYS 7/30/2020 COMPARISON: None HISTORY: ORDERING SYSTEM PROVIDED HISTORY: increased creatinine TECHNOLOGIST PROVIDED HISTORY: increased creatinine FINDINGS: Kidneys: The right kidney measures 10.8 x 5.1 x 5.5 cm. Left kidney measures 11.1 x 6.7 x 6.1 cm. Kidneys demonstrate increased cortical echogenicity consistent with medical renal disease. No hydronephrosis or nephrolithiasis. No cortical thinning. Increased bowel fluid complicates assessment. There appears be fluid-filled colon in the right lower quadrant. Increased cortical echogenicity consistent with medical renal disease. No hydronephrosis or nephrolithiasis. Increased fluid in the bowel.      Xr Chest Portable    Result Date: 7/29/2020  EXAMINATION: ONE XRAY VIEW OF THE CHEST 7/29/2020 upper abdomen are unremarkable. Soft Tissues/Bones: No acute bone or soft tissue abnormality. Multilevel degenerative changes in the visualized spine. No evidence of pulmonary embolism or acute pulmonary abnormality. Cta Head Neck W Contrast    Result Date: 7/27/2020  EXAMINATION: CTA OF THE HEAD AND NECK WITH CONTRAST 7/27/2020 8:45 pm: TECHNIQUE: CTA of the head and neck was performed with the administration of intravenous contrast. Multiplanar reformatted images are provided for review. MIP images are provided for review. Stenosis of the internal carotid arteries measured using NASCET criteria. Dose modulation, iterative reconstruction, and/or weight based adjustment of the mA/kV was utilized to reduce the radiation dose to as low as reasonably achievable. 3D reconstructed images were performed on a separate workstation and provided for review. COMPARISON: CT head 07/27/2020 HISTORY: ORDERING SYSTEM PROVIDED HISTORY: AMS TECHNOLOGIST PROVIDED HISTORY: AMS Reason for Exam: stroke alert ams Acuity: Acute Type of Exam: Initial FINDINGS: CTA NECK: AORTIC ARCH/ARCH VESSELS: No dissection or arterial injury. No significant stenosis of the brachiocephalic or subclavian arteries. CAROTID ARTERIES: There is motion involving the right proximal to mid cervical ICA challenging evaluation for intimal injury or subtle areas of dissection or atherosclerotic disease. No definite dissection, arterial injury, or hemodynamically significant stenosis by NASCET criteria. VERTEBRAL ARTERIES: No dissection, arterial injury, or significant stenosis. SOFT TISSUES: The lung apices are clear. No cervical or superior mediastinal lymphadenopathy. The larynx and pharynx are unremarkable. No acute abnormality of the salivary and thyroid glands. BONES: Multilevel degenerate change with moderate severe disc space disease notably C5 through C7. There is up to severe central canal narrowing C5-C6 and C6-C7.   Moderate multilevel facet arthropathy. CTA HEAD: ANTERIOR CIRCULATION: No significant stenosis of the intracranial internal carotid, anterior cerebral, or middle cerebral arteries. No aneurysm. POSTERIOR CIRCULATION: Bilateral P comms. No significant stenosis of the vertebral, basilar, or posterior cerebral arteries. No aneurysm. OTHER: No dural venous sinus thrombosis on this non-dedicated study. BRAIN: No mass effect or midline shift. No extra-axial fluid collection. The gray-white differentiation is maintained. No large vessel occlusion or hemodynamic stenosis Multilevel degenerate changes cervical spine.        Assessment & Differential Dx:      Primary Problem  COVID-19 virus infection    Active Hospital Problems    Diagnosis Date Noted    LELIA (acute kidney injury) ([facility] [address] [N17.9]     Shock circulatory ([address] [R57.9]     Pituitary insufficiency (HCC) [E23.0]     High anion gap metabolic acidosis [Z65.1]     New onset atrial fibrillation (HCC) [I48.91]     Pulseless electrical activity (HCC) [I46.9]     COVID-19 virus infection [U07.1] 07/28/2020    History of pituitary tumor [Z87.898] 07/28/2020    Seizure ([address] [R56.9] 07/28/2020    Secondary hypothyroidism [E03.8]     Somnolence [R40.0]     Toxic metabolic encephalopathy [Y51] 07/27/2020       Case of 60year-old admitted on 7/27/2020, [address]: DM2, migraine, pituitary tumor status post resection 2 years ago hypothyroidism, depression, erectile dysfunction,    Came to the ED as a stroke alert, he was found unresponsive at [address], patient works in a [employer] without air conditioning, temperature at 102 by EMS patient was noted to be drowsy but arousable by name, patient is following simple commands noted to have left arm and leg weakness more on the left than the right, loss of bowel and bladder function,    -CT head without contrast: Unremarkable for acute intracranial pathology  -CTA head and neck with contrast showed:  No LVO,  -UDS: Negative  -Patient loaded with Keppra 1 g and placed on 500 twice daily as a maintenance dose  -Patient found to be COVID-19 positive    During the patient's stay patient had cardiac arrest 4 times, 7/29, in and out of A. fib, patient intubated and moved to the ICU, off of Levophed, on Versed,    -Impression:  -Encephalopathy due to seizures versus heat stroke versus infectious less likely CVA    Plan:     -EEG per Dr. Hobson Mems: Diffuse polymorphic delta theta slowing suggestive of moderate to severe encephalopathy, near continuous frontal dominant rhythm  Delta activity concerning for subclinical seizures  -Keppra 1000 mg every 12 as a maintenance dose  -MRI limited to brain: Pending  -Repeat EEG after the cardiac arrest: Pending  -On aspirin 81 mg  -Thrombocytopenia: Lovenox was discontinued  -Lipitor 80  -Lipid panel: HDL: 24, LDL: 100, HbA1c: 5.3        La Archer MD, 7/31/2020 2:21 PM

## 2020-07-31 NOTE — PROGRESS NOTES
Neurology Nurse Practitioner Progress Note      INTERVAL HISTORY: This is a 60 y.o.  male admitted 7/27/2020 for unresponsiveness. This is a follow-up neurology progress note. The chart was reviewed. Discussed with the RN. There were no acute events overnight. Patient stays intubated, breathing over the vent; off of Levophed, on low-dose Versed; reportedly is not withdrawing; still unresponsive without sedation. MRI brain awaited. Regular EEG - awaited. HPI: Sherryle Amour is a 60 y.o. male with significant H/O pituitary tumor resection (2016), medication noncompliance, DM, who was admitted 7/27/2020 for unresponsiveness. According to medical record, patient was working at a [address] with no air conditioning and his coworkers found him unresponsive on the ground; EMS were called who found him to be febrile with [address]:  At arrival, he was awake but lethargic with some weakness of right leg and slurring; got evaluated by the stroke team with [facility] 4.  CT head was negative. CTA head and neck was unremarkable. He was loaded with Keppra 1 g IVPB x1 with initial plan to maintain at 500 mg twice a day. EEG showed moderate to severe encephalopathy with near continuous frontally dominant rhythmic delta activity appeared to be on ictal interictal spectrum and was concerning for subclinical seizures; Keppra maintenance dose was increased to 1 g IVPB twice a day after bolus of 500 mg was given. COVID was positive; patient was transferred to [facility] unit. On 7/29 patient had PEA arrest undergoing 2 rounds of CPR, in and out of A. fib requiring bolus of amiodarone before NSR. Reportedly, he coded 2 more times; got intubated and transferred to ICU where he had another PEA arrest.  During 1 of the codes, his blood sugar was noted to be 11. With code, LTME with diffuse slowing. LTME was D/C'd during that time. Patient developed copious diarrhea; was started on Levophed for shock.       insulin lispro  0-12 Units Subcutaneous Q6H    famotidine (PEPCID) injection  20 mg Intravenous BID    insulin glargine  10 Units Subcutaneous Nightly    hydrocortisone sodium succinate PF  50 mg Intravenous Q6H    piperacillin-tazobactam  3.375 g Intravenous Q8H    levothyroxine  50 mcg Intravenous Daily    And    sodium chloride (PF)  5 mL Intravenous Daily    sodium chloride flush  10 mL Intravenous 2 times per day    sodium chloride flush  10 mL Intravenous 2 times per day    aspirin  81 mg Oral Daily    atorvastatin  40 mg Oral Nightly    levetiracetam  1,000 mg Intravenous Q12H       Past Medical History:   Diagnosis Date    Depression     ED (erectile dysfunction)     Hypothyroid     Migraine     Pituitary tumor     Type 2 diabetes mellitus ([facility] [address]     resolved with 50# weight loss       Past Surgical History:   Procedure Laterality Date    PITUITARY EXCISION  10/05/2016       PHYSICAL EXAM:      Blood pressure (!) 144/87, pulse 128, temperature 99.5 °F (37.5 °C), temperature source Bladder, resp. rate 26, height 5' 10\" (1.778 m), weight 170 lb (77.1 kg), SpO2 100 %. Due to the current efforts to prevent transmission of COVID-19 and also the need to preserve PPE for other caregivers, a face-to-face encounter with the patient was not performed. That being said, all relevant records and diagnostic tests were reviewed, including laboratory results and imaging. Patient was observed through the window. Please reference any relevant documentation elsewhere. Care was coordinated with the primary service. Per RN, patient stays intubated, off of Levophed, on Versed.  Stayed unresponsive without sedation  Pupils reactive  Corneal reflexes absent  No response at loud sound or painful stimuli  No limb withdrawal        DATA    Lab Results   Component Value Date    WBC 11.8 (H) 07/30/2020    HGB 12.5 (L) 07/30/2020    HCT 36.5 (L) 07/30/2020    PLT See Reflexed IPF Result 07/30/2020    ALT 27 07/28/2020 unresponsive without sedation. Case was discussed with Dr. Mary Mi; too early for neurological prognosis at this time; will re-evaluate 48 hours post cardiac arrest.  MRI brain might give a clear picture for possible anoxia? COVID positive with no evidence of pneumonia; is on Zosyn empirically, though no clear infection has been found, as per ID team    LEILA; hypernatremia; nephrology on board    Thrombocytopenia; Lovenox was D/C'd     Continue ASA 81 mg QD, Lipitor 40 mg HS    Comorbid conditions- DM, hypothyroidism, pituitary tumor resection (2016), patient noncompliance    Will follow    Please note that this note was generated using a voice recognition dictation software. Although every effort was made to ensure the accuracy of this automated transcription, some errors in transcription may have occurred.

## 2020-08-01 NOTE — PROGRESS NOTES
Comprehensive Nutrition Assessment    Type and Reason for Visit:  Initial    Nutrition Recommendations/Plan:   -Monitor nutrition plans  -Please consult if nutrition support needed    Nutrition Assessment:  Pt has been NPO x 5 days. Currently intubated-no sedation, no gag. Noted reported poor prognosis. Malnutrition Assessment:  Malnutrition Status:  Insufficient data    Context:  Acute Illness         Estimated Daily Nutrient Needs:  Energy (kcal):  4478-7616 kcal/d (20-22 kcal/kg); Weight Used for Energy Requirements:  Current     Protein (g):  90-115gm (1.2-1.5gm/kg); Weight Used for Protein Requirements:  Current           Wounds:  None       Current Nutrition Therapies:    Diet NPO Effective Now    Anthropometric Measures:  · Height: 5' 10\" (177.8 cm)  · Current Body Weight: 169 lb 15.6 oz (77.1 kg)   · Ideal Body Weight: 166 lbs; % Ideal Body Weight     · BMI: 24.4  · BMI Categories: Normal Weight (BMI 22.0 to 24.9) age over 72       Nutrition Diagnosis:   · Inadequate oral intake related to impaired respiratory funtion as evidenced by NPO or clear liquid status due to medical condition, intubation      Nutrition Interventions:   Food and/or Nutrient Delivery:  Continue NPO  Nutrition Education/Counseling:  Education not indicated   Coordination of Nutrition Care:  Continued Inpatient Monitoring    Goals:  Meet % of estimated nutrtion needs       Nutrition Monitoring and Evaluation:   Behavioral-Environmental Outcomes:      Food/Nutrient Intake Outcomes:  Diet Advancement/Tolerance  Physical Signs/Symptoms Outcomes:  Biochemical Data, Hemodynamic Status, Weight     Discharge Planning:     Too soon to determine     Electronically signed by Linda Reed RD, LD on 8/1/20 at 2:42 PM EDT    Contact: [phone]

## 2020-08-01 NOTE — PROGRESS NOTES
Progress Note    Patient - Rachid Bunch  Date of Admission -  2020  8:06 PM  Date of Evaluation -  2020  Room and Bed Number -  4829/0710-09   Hospital Day - 5    CHIEF COMPLAINT : covid -19 pneumonia    SUBJECTIVE:     OVERNIGHT EVENTS:         Renal fx is worse   No resp to deep pain   Mri - anoxic brain injury       AWAKE & FOLLOWING COMMANDS:  [x] No   [] Yes    SECRETIONS Amount:  [x] Small [] Moderate  [] Large  [] None  Color:     [x] White [] Colored  [] Bloody    SEDATION:  RAAS Score:  [] Propofol gtt  [] Versed gtt  [] Ativan gtt   [x] No Sedation    PARALYZED:  [x] No    [] Yes    VASOPRESSORS:  [x] No    [] Yes  [] Levophed [] Dopamine [] Vasopressin  [] Dobutamine [] Phenylephrine [] Epinephrine      OBJECTIVE:     VITAL SIGNS:  /75   Pulse 101   Temp 100 °F (37.8 °C) (Esophageal)   Resp 24   Ht 5' 10\" (1.778 m)   Wt 170 lb (77.1 kg)   SpO2 98%   BMI 24.39 kg/m²   Tmax over 24 hours:  Temp (24hrs), Av.7 °F (37.6 °C), Min:99.5 °F (37.5 °C), Max:100 °F (37.8 °C)      Patient Vitals for the past 8 hrs:   BP Temp src Pulse Resp SpO2   20 1352 -- -- 101 24 98 %   20 1200 126/75 -- 108 24 98 %   20 1100 130/75 -- 109 26 98 %   20 1000 131/81 -- 113 26 98 %   20 0900 128/71 Bladder 115 24 97 %   20 0845 -- -- 115 26 98 %         Intake/Output Summary (Last 24 hours) at 2020 1610  Last data filed at 2020 0400  Gross per 24 hour   Intake 1177 ml   Output 153 ml   Net 1024 ml     Date 20 0000 - 20 2359   Shift 6841-5335 0835-1806 3874-7480 24 Hour Total   INTAKE   Shift Total(mL/kg)       OUTPUT   Urine(mL/kg/hr) 85(0.1)   85   Shift Total(mL/kg) 85(1.1)   85(1.1)   Weight (kg) 77.1 77.1 77.1 77.1     Wt Readings from Last 3 Encounters:   20 170 lb (77.1 kg)     Body mass index is 24.39 kg/m².         PHYSICAL EXAM:  Unresponsive       MEDICATIONS:  Scheduled Meds:   furosemide  40 mg Intravenous BID    famotidine (PEPCID) injection  20 mg Intravenous Daily    insulin lispro  0-12 Units Subcutaneous Q6H    insulin glargine  10 Units Subcutaneous Nightly    hydrocortisone sodium succinate PF  50 mg Intravenous Q6H    levothyroxine  50 mcg Intravenous Daily    And    sodium chloride (PF)  5 mL Intravenous Daily    sodium chloride flush  10 mL Intravenous 2 times per day    sodium chloride flush  10 mL Intravenous 2 times per day    aspirin  81 mg Oral Daily    atorvastatin  40 mg Oral Nightly    levetiracetam  1,000 mg Intravenous Q12H     Continuous Infusions:   midazolam Stopped (07/31/20 1450)    sodium chloride Stopped (07/30/20 1841)    IV infusion builder 100 mL/hr at 08/01/20 0520    norepinephrine Stopped (07/30/20 0445)    EPINEPHrine infusion Stopped (07/29/20 1910)    dextrose       PRN Meds:   metoprolol, 5 mg, Q4H PRN  metoprolol, 2.5 mg, Q6H PRN  potassium chloride, 20 mEq, PRN  sodium chloride flush, 10 mL, PRN  acetaminophen, 650 mg, Q6H PRN    Or  acetaminophen, 650 mg, Q6H PRN  polyethylene glycol, 17 g, Daily PRN  promethazine, 12.5 mg, Q6H PRN    Or  ondansetron, 4 mg, Q6H PRN  nicotine, 1 patch, Daily PRN  LORazepam, 2 mg, Q4H PRN  diphenhydrAMINE, 25 mg, Q6H PRN  glucose, 15 g, PRN  dextrose, 12.5 g, PRN  glucagon (rDNA), 1 mg, PRN  dextrose, 100 mL/hr, PRN        SUPPORT DEVICES: [x] Ventilator [] BIPAP  [] Nasal Cannula [] Room Air    VENT SETTINGS (Comprehensive) (if applicable):    Vent Information  $Ventilation: $Subsequent Day  Skin Assessment: Clean, dry, & intact  Suction Catheter Diameter: 14  Equipment ID: [id number]  Vent Type: Servo i  Vent Mode: PRVC  Vt Ordered: 460 mL  Rate Set: 18 bmp  FiO2 : 30 %  SpO2: 98 %  SpO2/FiO2 ratio: 323.33  Sensitivity: 2  Flow by: 2  PEEP/CPAP: 5  I Time/ I Time %: 0.8 s  Humidification Source: HME  Nitric Oxide/Epoprostenol In Use?: No  Additional Respiratory  Assessments  Pulse: 101  Resp: 24  SpO2: 98 %  End Tidal CO2: 31 (%)  Position: Semi-[place]'s  Humidification Source: HME  Oral Care Completed?: Yes  Oral Care: Mouth swabbed, Mouth suctioned  Subglottic Suction Done?: Yes  Cuff Pressure (cm H2O): (mov)  Skin barrier applied: No    ABGs:     Lab Results   Component Value Date    GEF3KVL 24 08/01/2020    FIO2 30.0 08/01/2020       DATA:  Complete Blood Count:   Recent Labs     07/30/20  0419 07/31/20  0400 08/01/20  0536   WBC 11.8* 16.2* 15.9*   RBC 4.40 4.05* 2.85*   HGB 12.5* 11.1* 8.0*   HCT 36.5* 33.4* 24.9*   MCV 83.0 82.5* 87.4   MCH 28.4 27.4 28.1   MCHC 34.2 33.2 32.1   RDW 14.9* 15.5* 15.7*   PLT See Reflexed IPF Result See Reflexed IPF Result See Reflexed IPF Result   MPV NOT REPORTED NOT REPORTED NOT REPORTED        Last 3 Blood Glucose:   Recent Labs     07/29/20  1809 07/30/20  0419 07/30/20  0940 07/31/20  0400 08/01/20  0536   GLUCOSE 261* 352* 350* 156* 94        PT/INR:    Lab Results   Component Value Date    PROTIME 10.9 07/28/2020    INR 1.0 07/28/2020     PTT:    Lab Results   Component Value Date    APTT 32.4 08/01/2020       Comprehensive Metabolic Profile:   Recent Labs     07/30/20  0940 07/30/20  1643  07/31/20  0400  07/31/20  1520 08/01/20  0105 08/01/20  0536   *  --    < > 145*   < > 146* 142 144   K 3.6*  --    < > 4.5   < > 4.4 4.6 4.6   *  --    < > 108*   < > 107 104 104   CO2 20  --    < > 21   < > 22 20 20   BUN 33*  --   --  41*  --   --   --  65*   CREATININE 3.69*  --   --  4.68*  --   --   --  6.04*   GLUCOSE 350*  --   --  156*  --   --   --  94   CALCIUM 7.8*  --   --  7.4*  --   --   --  7.1*   PROT  --  4.3*  --   --   --   --   --   --     < > = values in this interval not displayed.       Magnesium:   Lab Results   Component Value Date    MG 1.4 07/31/2020    MG 1.5 07/30/2020    MG 1.7 07/30/2020     Phosphorus:   Lab Results   Component Value Date    PHOS 2.3 07/30/2020     Ionized Calcium: No results found for: NATHAN     Urinalysis:   Lab Results   Component Value Date    NITRU NEGATIVE 07/27/2020    COLORU YELLOW 07/27/2020    PHUR 5.0 07/27/2020    WBCUA None 07/27/2020    RBCUA 0 TO 2 07/27/2020    MUCUS NOT REPORTED 07/27/2020    TRICHOMONAS NOT REPORTED 07/27/2020    YEAST NOT REPORTED 07/27/2020    BACTERIA NOT REPORTED 07/27/2020    SPECGRAV 1.052 07/27/2020    LEUKOCYTESUR NEGATIVE 07/27/2020    UROBILINOGEN Normal 07/27/2020    BILIRUBINUR NEGATIVE 07/27/2020    GLUCOSEU NEGATIVE 07/27/2020    KETUA NEGATIVE 07/30/2020    AMORPHOUS NOT REPORTED 07/27/2020       HgBA1c:    Lab Results   Component Value Date    LABA1C 5.3 07/28/2020     TSH:    Lab Results   Component Value Date    TSH <0.01 07/28/2020       Xr Chest (single View Frontal)    Result Date: 7/30/2020  Satisfactory position of the lines and catheters. Cardiomegaly with perihilar edema. Ct Head Wo Contrast    Result Date: 7/27/2020  No acute intracranial abnormality. Findings were communicated electronically to Dr. Dora Canales through the radiology results communication center. Us Renal Complete    Result Date: 7/30/2020  Increased cortical echogenicity consistent with medical renal disease. No hydronephrosis or nephrolithiasis. Increased fluid in the bowel. Xr Chest Portable    Result Date: 7/29/2020  Right IJ central line appears in satisfactory position without evidence of pneumothorax. Endotracheal tube also appears in satisfactory position. Ct Chest Pulmonary Embolism W Contrast    Result Date: 7/27/2020  No evidence of pulmonary embolism or acute pulmonary abnormality. Cta Head Neck W Contrast    Result Date: 7/27/2020  No large vessel occlusion or hemodynamic stenosis Multilevel degenerate changes cervical spine. Mri Limited Brain    Result Date: 7/31/2020  Evidence of diffusely increased diffusion signal throughout the cortex. Clinical correlation is strongly recommended. Findings can be seen with hypoxic ischemic injury in the appropriate clinical setting.         ASSESSMENT:     Acute hypoxic respiratory failure due to covid -19 pneumonia     Lactic acidosis   PEA cardiac arrest   Anoxic brain injury   Shock - adrenal better   LEILA   Hypokalemia     COVID-19 virus infection    Secondary hypothyroidism    History of pituitary tumor    Seizure (HCC)    Somnolence    Pituitary insufficiency (HCC)    High anion gap metabolic acidosis    New onset atrial fibrillation (HCC)    Pulseless electrical activity (HCC)  Resolved Problems:    * No resolved hospital problems. *       PLAN:     Mri noted   Unresponsive   Anoxic brain injury due to cardiorespiratory arrest   rec palliative care   D/wprimary   Total critical care time caring for this patient with life threatening, unstable organ failure, including direct patient contact, management of life support systems, review of data including imaging and labs, discussions with other team members and physicians at least 27  Min so far today, excluding procedures.    Electronically signed by Gayle Benoit MD on 8/1/2020 at 4:10 PM

## 2020-08-01 NOTE — PROGRESS NOTES
Jaquelin Cesar 18    Progress Note    8/1/2020    4:17 PM    Name:   Zygmunt Kawasaki  MRN:     [id number]     [place]:      [de-identified]   Room:   [address] [place] [address] Day:  5  Admit Date:  7/27/2020  8:06 PM    PCP:   No primary care provider on file. Code Status:  Full Code    Subjective:     C/C:   Chief Complaint   Patient presents with    Altered Mental Status     Interval History Status: Not changed  Patient seen and examined at bedside, no acute events overnight. Continue to be unresponsive to any kind of stimuli was almost no reflexes including gag reflex  Continue to be off sedation  Currently in sinus tachycardia   Sodium slightly elevated  Creatinine continue to increase, minimal urine output  Patient vitals, labs and all providers notes were reviewed,from overnight shift and morning updates were noted and discussed with the nurse    Brief History:     Zygmunt Kawasaki is 60 y.o. male  Who was admitted to the hospital on 7/27/2020 for treatment of COVID-19 virus infection. Patient was brought to emergency room with altered mental status. He was at work and was found on the ground several hours later. Patient was confused, drowsy and not answering questions. He did not have any focal weakness. Evaluation in ER showed temperature 102. Lab evaluation showed low cortisol level, low TSH. COVID-19 test was positive. CT chest was negative for PE and did not show any Pneumonia. Neurology was consulted for possible TIA Vs syncope. CT head was negative for acute IC abnormality. Patient has history of pituitary adenoma S/P resection in 10/2016 at [facility] clinic by Dr. Krissy Crane. Patient had failed to follow-up with the clinic. Patient follows with Dr. Helayne Riedel MD.  It appears from outpatient note with PCP on 5/13/2020 that patient has not been taking his medications.   He has not been following his endocrinologist.  He has chronic pituitary sodium chloride flush, acetaminophen **OR** acetaminophen, polyethylene glycol, promethazine **OR** ondansetron, nicotine, LORazepam, diphenhydrAMINE, glucose, dextrose, glucagon (rDNA), dextrose    Data:     Past Medical History:   has a past medical history of Depression, ED (erectile dysfunction), Hypothyroid, Migraine, Pituitary tumor, and Type 2 diabetes mellitus ([address] Social History:   reports that he has never smoked. He has never used smokeless tobacco. He reports that he does not drink alcohol. Family History: History reviewed. No pertinent family history. Vitals:  /75   Pulse 101   Temp 100 °F (37.8 °C) (Esophageal)   Resp 24   Ht 5' 10\" (1.778 m)   Wt 170 lb (77.1 kg)   SpO2 98%   BMI 24.39 kg/m²   Temp (24hrs), Av.7 °F (37.6 °C), Min:99.5 °F (37.5 °C), Max:100 °F (37.8 °C)    Recent Labs     20  1720 20  0052 20  0449 20  1052   POCGLU 99 107 85 88       I/O (24Hr):     Intake/Output Summary (Last 24 hours) at 2020 1617  Last data filed at 2020 0400  Gross per 24 hour   Intake 1177 ml   Output 153 ml   Net 1024 ml       Labs:  Hematology:  Recent Labs     20  04120  0400 20  0536   WBC 11.8* 16.2* 15.9*   RBC 4.40 4.05* 2.85*   HGB 12.5* 11.1* 8.0*   HCT 36.5* 33.4* 24.9*   MCV 83.0 82.5* 87.4   MCH 28.4 27.4 28.1   MCHC 34.2 33.2 32.1   RDW 14.9* 15.5* 15.7*   PLT See Reflexed IPF Result See Reflexed IPF Result See Reflexed IPF Result   MPV NOT REPORTED NOT REPORTED NOT REPORTED   DDIMER 58.48 24.85 7.35     Chemistry:  Recent Labs     20  04120  0940  20  0400  20  1520 20  0105 20  0536   * 145*   < > 145*   < > 146* 142 144   K 2.1* 3.6*   < > 4.5   < > 4.4 4.6 4.6   * 110*   < > 108*   < > 107 104 104   CO2 14* 20   < > 21   < > 22 20 20   GLUCOSE 352* 350*  --  156*  --   --   --  94   BUN 32* 33*  --  41*  --   --   --  65*   CREATININE 3.67* 3.69*  --  4.68*  --   -- --  6.04*   MG 1.7 1.5*  --   --   --  1.4*  --   --    ANIONGAP 22* 15   < > 16   < > 17 18* 20*   LABGLOM 17* 17*  --  13*  --   --   --  10*   GFRAA 21* 20*  --  16*  --   --   --  12*   CALCIUM 8.5* 7.8*  --  7.4*  --   --   --  7.1*   PHOS  --  2.3*  --   --   --   --   --   --    LACTACIDWB  --  5.4*  --   --   --   --   --   --     < > = values in this interval not displayed. Recent Labs     07/30/20  1643  07/31/20  0749 07/31/20  1237 07/31/20  1720 08/01/20  0052 08/01/20  0449 08/01/20  1052   PROT 4.3*  --   --   --   --   --   --   --    POCGLU  --    < > 140* 136* 99 107 85 88    < > = values in this interval not displayed. ABG:  Lab Results   Component Value Date    POCPH 7.478 08/01/2020    POCPCO2 30.9 08/01/2020    POCPO2 99.4 08/01/2020    POCHCO3 22.9 08/01/2020    NBEA NOT REPORTED 08/01/2020    PBEA 0 08/01/2020    TOW1JDD 24 08/01/2020    JJDF0FNT 98 08/01/2020    FIO2 30.0 08/01/2020     Lab Results   Component Value Date/Time    SPECIAL RT HAND 10ML 07/30/2020 01:57 AM     Lab Results   Component Value Date/Time    CULTURE NO GROWTH 2 DAYS 07/30/2020 01:57 AM       Radiology:  Norbert Galarza Chest (single View Frontal)    Result Date: 7/30/2020  Satisfactory position of the lines and catheters. Cardiomegaly with perihilar edema. Ct Head Wo Contrast    Result Date: 7/27/2020  No acute intracranial abnormality. Findings were communicated electronically to Dr. Carmelina Montes through the radiology results communication center. Us Renal Complete    Result Date: 7/30/2020  Increased cortical echogenicity consistent with medical renal disease. No hydronephrosis or nephrolithiasis. Increased fluid in the bowel. Xr Chest Portable    Result Date: 7/29/2020  Right IJ central line appears in satisfactory position without evidence of pneumothorax. Endotracheal tube also appears in satisfactory position.      Ct Chest Pulmonary Embolism W Contrast    Result Date: 7/27/2020  No evidence of pulmonary embolism or acute pulmonary abnormality. Cta Head Neck W Contrast    Result Date: 7/27/2020  No large vessel occlusion or hemodynamic stenosis Multilevel degenerate changes cervical spine. Physical Examination:        General appearance: intubated and unresponsive off sedation   Mental Status:  Unable to assess   Lungs:  diminished bilaterally, normal effort  Heart:  regular rate and rhythm, no murmur  Abdomen:  soft, nontender, nondistended, sluggish  bowel sounds, no masses, hepatomegaly, splenomegaly  Extremities:  no edema, redness, tenderness in the calves  Skin:  no gross lesions, rashes, induration    Assessment:        Hospital Problems           Last Modified POA    * (Principal) COVID-19 virus infection 7/28/2020 Yes    Toxic metabolic encephalopathy 7/17/4692 Yes    Secondary hypothyroidism 7/29/2020 Yes    History of pituitary tumor 7/28/2020 Yes    Seizure ([address] 7/28/2020 Yes    Somnolence 7/28/2020 Yes    Shock circulatory ([address] 7/29/2020 Yes    Pituitary insufficiency ([address] 7/29/2020 Yes    High anion gap metabolic acidosis 3/71/8258 Yes    New onset atrial fibrillation ([address] 7/29/2020 Yes    Pulseless electrical activity ([address] 7/29/2020 Yes    LEILA (acute kidney injury) ([address] 7/31/2020 Yes    Anoxic encephalopathy ([address] 7/31/2020 Yes          Plan:         I reviewed hospital course including labs, images and notes    Unresponsiveness/acute metabolic encephalopathy:due to severe anoxic brain injury  ? I discussed the case with neurology attending Dr. Víctor Bray , significant damage, no anticipated meaningful recovery, I reviewed MRI brain and EEG results    PEA cardiac arrest: On 7/29, patient arrested x5 with successful risk every time, etiology is unclear, seizure and hypoxia? Cardiology consulted    Acute hypoxemic respiratory failure: Patient is currently ventilated  , Management per pulmonology.     Circulatory shock: Needed pressors and fluids, currently off pressors    Acute kidney injury: Creatinine continue to worsen, likely ATN from hypovolemia and contrast exposure, creatinine continue to worsen, discussed with nephrology again today  Patient  need dialysis but given his overall condition decision is to hold will be based on his meaningful neurological recovery    Hyponatremia: Fluid switch per nephrology    Anion gap metabolic acidosis: Likely secondary to his acute kidney injury needed bicarb infusion    Pituitary cancer? Status post resection in 2016    DM2: Continue diabetes home meds, insulin sliding scale, hypoglycemia protocol, will follow    Thrombocytopenia: DVT prophylaxis with EPC only      I called the patient daughter Nabil Zayas and discussed in details with her all the updates and his neurological status, also I discussed with her that patient is needing dialysis but in the given condition and his significant brain damage it is futile to start dialysis, she became tearful and understands that we are discussing now withdrawing care   she reports that all her siblings are agreeing that she takes the needed decision , our spiritual care involved, will add palliative care consult to help us with expediting that process.   Currently patient remains full code      Spent 40 minutes  in coordinating care and discussing the plane with  with staff and specialists          Aline Burnett MD  8/1/2020  4:17 PM

## 2020-08-01 NOTE — PROCEDURES
LONG-TERM EEG-VIDEO [id number] [address]    Patient: Zygmunt Kawasaki  Age: 60 y.o. MRN: [id number]    Referring Physician: No ref. provider found  History: The patient is a 60 y.o. male who presented breakthrough seizure/encephalopathy. This long-term video-EEG monitoring study was performed to determine the nature of the patient's clinical events. The patient is on neuroactive medications.    Zygmunt Kawasaki   Current Facility-Administered Medications   Medication Dose Route Frequency Provider Last Rate Last Dose    famotidine (PEPCID) injection 20 mg  20 mg Intravenous Daily Shaun Almazan MD   20 mg at 08/01/20 0909    metoprolol (LOPRESSOR) injection 5 mg  5 mg Intravenous Q4H PRN Kaur Rowley MD   5 mg at 07/31/20 1531    insulin lispro (HUMALOG) injection vial 0-12 Units  0-12 Units Subcutaneous Q6H Rosenhayn Needle, APRN - CNP   2 Units at 07/31/20 0600    midazolam (VERSED) 1 mg/mL in D5W infusion  1 mg/hr Intravenous Continuous [place] HI Blood, DO   Stopped at 07/31/20 1450    insulin glargine (LANTUS) injection vial 10 Units  10 Units Subcutaneous Nightly Jose E Romero MD   10 Units at 07/30/20 1134    0.45 % sodium chloride infusion   Intravenous Continuous Jose E Romero MD   Stopped at 07/30/20 1841    sodium chloride 50 mEq in sterile water infusion   Intravenous Continuous Nas Beltran  mL/hr at 08/01/20 0520      metoprolol (LOPRESSOR) injection 2.5 mg  2.5 mg Intravenous Q6H PRN Shaun Almazan MD   2.5 mg at 07/29/20 1158    hydrocortisone sodium succinate PF (SOLU-CORTEF) injection 50 mg  50 mg Intravenous Q6H Shaun Almazan MD   50 mg at 08/01/20 0906    norepinephrine (LEVOPHED) 16 mg in sodium chloride 0.9 % 250 mL infusion  2 mcg/min Intravenous Continuous Shaun Almazan MD   Stopped at 07/30/20 0445    EPINEPHrine (EPINEPHrine HCL) 5 mg in dextrose 5 % 250 mL infusion  1 mcg/min Intravenous Continuous Guerry Hodgkins, MD   Stopped at 07/29/20 1910    levothyroxine (SYNTHROID) injection 50 mcg  50 mcg Intravenous Daily Jesus Marie MD   50 mcg at 08/01/20 1589    And    sodium chloride (PF) 0.9 % injection 5 mL  5 mL Intravenous Daily Jesus Marie MD   5 mL at 07/31/20 0756    sodium chloride flush 0.9 % injection 10 mL  10 mL Intravenous 2 times per day Guerry Hodgkins, MD   10 mL at 07/31/20 0800    potassium chloride 20 mEq/50 mL IVPB (Central Line)  20 mEq Intravenous PRN Guerry Hodgkins, MD   Stopped at 07/30/20 1008    sodium chloride flush 0.9 % injection 10 mL  10 mL Intravenous 2 times per day Kathonorio Quana, APRN - CNP   10 mL at 07/31/20 0800    sodium chloride flush 0.9 % injection 10 mL  10 mL Intravenous PRN Kathljobyn Muta, APRN - CNP        acetaminophen (TYLENOL) tablet 650 mg  650 mg Oral Q6H PRN Kathlean Muta, APRN - CNP   650 mg at 07/31/20 1715    Or    acetaminophen (TYLENOL) suppository 650 mg  650 mg Rectal Q6H PRN Kathlean Muta, APRN - CNP   650 mg at 07/28/20 2322    polyethylene glycol (GLYCOLAX) packet 17 g  17 g Oral Daily PRN Kathlean Muta, APRN - CNP        promethazine (PHENERGAN) tablet 12.5 mg  12.5 mg Oral Q6H PRN Kathlean Muta, APRN - CNP        Or    ondansetron (ZOFRAN) injection 4 mg  4 mg Intravenous Q6H PRN Kathlean Muta, APRN - CNP        nicotine (NICODERM CQ) 21 MG/24HR 1 patch  1 patch Transdermal Daily PRN Kathlean Muta, APRN - CNP        LORazepam (ATIVAN) injection 2 mg  2 mg Intravenous Q4H PRN Kathlean Muta, APRN - CNP        diphenhydrAMINE (BENADRYL) injection 25 mg  25 mg Intravenous Q6H PRN Venus Ruiz APRN - CNP   25 mg at 07/28/20 2321    aspirin EC tablet 81 mg  81 mg Oral Daily Yvan Palacios MD   81 mg at 08/01/20 0910    atorvastatin (LIPITOR) tablet 40 mg  40 mg Oral Nightly Yvan Palacios MD   40 mg at 07/31/20 2122    glucose continued in order to record the patient's typical events. The EKG channel revealed no abnormalities. Parveen Downey MD  Diplomate, American Board of Psychiatry and Neurology  Diplomate, American Board of Clinical Neurophysiology  Diplomate, American Board of Epilepsy     Please note this is a preliminary report and updated daily. The final report will have a summary of behavior and electrographic findings with clinical correlation.

## 2020-08-01 NOTE — PROGRESS NOTES
was noted to have tongue biting. COVID-19 virus testing was positive and the patient was admitted to the [facility] unit. The patient does have a history of a pituitary tumor which was resected 2 years ago. The patient is suspected to have had a seizure and I was asked to evaluate for the COVID-19 infection. The patient does not recall the events of what happened and does not able to get much out of him today. The EEG showed diffuse polymorphic delta theta slowing suggestive of moderate to severe encephalopathy and frontal dominant rhythm delta activity concerning for subclinical seizure. On 2020 the patient had clinical deterioration with a PEA arrest and after successful resuscitation he had subsequent PEA and asystolic arrest requiring ACLS protocol. The patient was intubated started on Levophed for blood pressure support and was transferred to the intensive care unit    Current evaluation:2020    /82   Pulse 120   Temp 100 °F (37.8 °C) (Esophageal)   Resp 25   Ht 5' 10\" (1.778 m)   Wt 170 lb (77.1 kg)   SpO2 97%   BMI 24.39 kg/m²     Temperature Range: Temp: 100 °F (37.8 °C) Temp  Av.7 °F (37.6 °C)  Min: 99.5 °F (37.5 °C)  Max: 100 °F (37.8 °C)  The patient is seen and evaluated at bedside he remains intubated on the ventilator at 30% FiO2. He is off all sedation and pressors  He has significant cognitive dysfunction and has not been active at all as a matter fact he has been comatose with absent corneal and gag reflexes. Review of Systems   Unable to perform ROS: Intubated       Physical Examination :     Physical Exam  Constitutional:       Appearance: He is well-developed. Interventions: He is intubated. HENT:      Head: Normocephalic and atraumatic. Eyes:      Comments: There is some chemosis  Gaze is fixed upward to the right  There is some nystagmus noted   Neck:      Musculoskeletal: Normal range of motion and neck supple.    Cardiovascular:      Rate and Rhythm: Normal rate. Heart sounds: Normal heart sounds. Pulmonary:      Effort: Pulmonary effort is normal. He is intubated. Breath sounds: Normal breath sounds. Abdominal:      General: Bowel sounds are normal. There is distension. Palpations: Abdomen is soft. Musculoskeletal:      Comments: The patient has bilateral upper and lower extremity edema   Skin:     General: Skin is warm and dry. Neurological:      Comments: The patient is comatose and has flaccid paralysis         Laboratory data:   I have independently reviewed the followinglabs:  CBC with Differential:   Recent Labs     07/29/20  1324  07/31/20  0400 08/01/20  0536   WBC 10.5   < > 16.2* 15.9*   HGB 13.8   < > 11.1* 8.0*   HCT 46.5   < > 33.4* 24.9*      < > See Reflexed IPF Result See Reflexed IPF Result   LYMPHOPCT 61*  --   --   --    MONOPCT 7  --   --   --     < > = values in this interval not displayed. BMP:   Recent Labs     07/30/20  0940  07/31/20  0400  07/31/20  1520 08/01/20  0105 08/01/20  0536   *   < > 145*   < > 146* 142 144   K 3.6*   < > 4.5   < > 4.4 4.6 4.6   *   < > 108*   < > 107 104 104   CO2 20   < > 21   < > 22 20 20   BUN 33*  --  41*  --   --   --  65*   CREATININE 3.69*  --  4.68*  --   --   --  6.04*   MG 1.5*  --   --   --  1.4*  --   --     < > = values in this interval not displayed. Hepatic Function Panel:   Recent Labs     07/30/20  1643   PROT 4.3*     No results for input(s): VANCOTROUGH in the last 72 hours. No results found for: CRP  No results found for: SEDRATE    No results for input(s): PROCAL in the last 72 hours.    D-Dimer, Quant  7.35  mg/L FEU  Final  08/01/2020  5:36 AM  [address]      Fibrinogen  429High    140 - 420 mg/dL  Final  08/01/2020  5:36 AM  [address]        D-Dimer, Quant  24.85  mg/L FEU  Final  07/31/2020  4:00 AM  [address]      Fibrinogen  415  140 - 420 mg/dL  Final  07/31/2020  4:00 AM  [address]        Lactic Acid, Whole Blood  5. 4High    0.7 - 2.1 mmol/L  Final  07/30/2020  9:40 AM  [address]      -Dimer, Quant  58.48  mg/L FEU  Final  07/30/2020  4:19 AM  [address]      Fibrinogen  291  140 - 420 mg/dL  Final  07/30/2020  4:19 AM  [address]        Lactic Acid, Whole Blood  10. 3High    0.7 - 2.1 mmol/L  Final  07/29/2020  1:24 PM  [address]        Imaging Studies:   MRI OF THE BRAIN WITHOUT CONTRAST  7/31/2020 7:13 pm  Impression    Evidence of diffusely increased diffusion signal throughout the cortex. Clinical correlation is strongly recommended.  Findings can be seen with    hypoxic ischemic injury in the appropriate clinical setting. ONE XRAY VIEW OF THE CHEST 7/30/2020 6:12 pm  Impression    Satisfactory position of the lines and catheters.         Cardiomegaly with perihilar edema.               RETROPERITONEAL ULTRASOUND OF THE KIDNEYS 7/30/2020   Impression    Increased cortical echogenicity consistent with medical renal disease.  No    hydronephrosis or nephrolithiasis.  Increased fluid in the bowel. Cultures:     Culture, Blood 1 [[id number]]   Collected: 07/30/20 0147    Order Status: Completed  Specimen: Blood  Updated: 08/01/20 0508     Specimen Description  . BLOOD     Special Requests  LT HAND 10ML     Culture  NO GROWTH 2 DAYS    Culture, Blood 2 [[id number]]   Collected: 07/30/20 0157    Order Status: Completed  Specimen: Blood  Updated: 08/01/20 0508     Specimen Description  . BLOOD     Special Requests  RT HAND 10ML     Culture  NO GROWTH 2 DAYS    Culture, Blood 1 [[id number]]   Collected: 07/27/20 2028    Order Status: Completed  Specimen: Blood  Updated: 08/01/20 0508     Specimen Description  . BLOOD     Special Requests  NOT REPORTED     Culture  NO GROWTH 5 DAYS    Culture, Blood 1 [[id number]]   Collected: 07/27/20 2030    Order Status: Completed  Specimen: Blood Updated: 08/01/20 6298     Specimen Description  . BLOOD     Special Requests  NOT REPORTED     Culture  NO GROWTH 5 DAYS      Gastrointestinal Panel, Molecular [[id number]]   Collected: 07/28/20 1220    Order Status: Completed  Specimen: Stool  Updated: 07/29/20 1542     Specimen Description  . FECES     Campylobacter PCR  NEGATIVE: No Campylobacter spp. (jejuni or coli) DNA Detected     Salmonella PCR  NEGATIVE: No Salmonella spp. DNA Detected     Shigatoxin Gene PCR  NEGATIVE: No Shiga toxin-producing gene(s) Detected     Shigella Sp PCR  NEGATIVE: No Shigella spp. / EIEC DNA Detected     Plesiomonas Shigelloides PCR  NEGATIVE: No Plesionomas shigelloides DNA Detected     Vibrio PCR  NEGATIVE: No Vibrio (V. vulnificus, V, parahaemolyticus and V. cholerae) DNA Detected     E Coli Enterotoxigenic PCR  NEGATIVE: No Enterotoxigenic E. coli (ETEC) Heat-labile and heat-stable (LT/ST) DNA Detected     Yersinia Enterocolitica PCR  NEGATIVE: No Yersinia enterocolitica DNA Detected    C DIFF TOXIN/ANTIGEN [[id number]]   Collected: 07/28/20 1220    Order Status: Completed  Specimen: Stool  Updated: 07/29/20 0850     Specimen Description  . FECES     C DIFF AG + TOXIN  NEGATIVE     Comment:  No C. difficile antigen and Toxin Detected. Culture, Urine [[id number]]   Collected: 07/27/20 2154    Order Status: Completed  Specimen: Urine, clean catch  Updated: 07/28/20 2112     Specimen Description  . CLEAN CATCH URINE     Special Requests  NOT REPORTED     Culture  NO SIGNIFICANT GROWTH        Medications:      famotidine (PEPCID) injection  20 mg Intravenous Daily    insulin lispro  0-12 Units Subcutaneous Q6H    insulin glargine  10 Units Subcutaneous Nightly    hydrocortisone sodium succinate PF  50 mg Intravenous Q6H    levothyroxine  50 mcg Intravenous Daily    And    sodium chloride (PF)  5 mL Intravenous Daily    sodium chloride flush  10 mL Intravenous 2 times per day    sodium chloride flush  10 mL Intravenous 2 times per day    aspirin  81 mg Oral Daily    atorvastatin  40 mg Oral Nightly    levetiracetam  1,000 mg Intravenous Q12H           [facility]  Santo Candelaria MD  [employer] messaging  OFFICE: [phone]      Electronically signed by Santo Candelaria MD on 8/1/2020 at 8:09 AM  Thank you for allowing us to participate in the care of this patient. Please call with questions. This note iscreated with the assistance of a speech recognition program.  While intending to generate a document that actually reflects the content of the visit, the document can still have some errors including those of syntax andsound a like substitutions which may escape proof reading. In such instances, actual meaning can be extrapolated by contextual diversion.

## 2020-08-01 NOTE — PLAN OF CARE
Problem: Airway Clearance - Ineffective  Goal: Achieve or maintain patent airway  8/1/2020 0545 by Vinita Golden RN  Outcome: Ongoing     Problem: Gas Exchange - Impaired  Goal: Absence of hypoxia  8/1/2020 0545 by Vinita Golden RN  Outcome: Ongoing     Problem: Gas Exchange - Impaired  Goal: Promote optimal lung function  8/1/2020 0545 by Vinita Golden RN  Outcome: Ongoing     Problem: Breathing Pattern - Ineffective  Goal: Ability to achieve and maintain a regular respiratory rate  8/1/2020 0545 by Vinita Golden RN  Outcome: Ongoing     Problem:  Body Temperature -  Risk of, Imbalanced  Goal: Ability to maintain a body temperature within defined limits  Outcome: Ongoing  Goal: Will regain or maintain usual level of consciousness  Outcome: Ongoing  Goal: Complications related to the disease process, condition or treatment will be avoided or minimized  Outcome: Ongoing     Problem: Isolation Precautions - Risk of Spread of Infection  Goal: Prevent transmission of infection  Outcome: Ongoing     Problem: Nutrition Deficits  Goal: Optimize nutrtional status  Outcome: Ongoing     Problem: Risk for Fluid Volume Deficit  Goal: Maintain normal heart rhythm  Outcome: Ongoing  Goal: Maintain absence of muscle cramping  Outcome: Ongoing  Goal: Maintain normal serum potassium, sodium, calcium, phosphorus, and pH  Outcome: Ongoing     Problem: Loneliness or Risk for Loneliness  Goal: Demonstrate positive use of time alone when socialization is not possible  Outcome: Ongoing     Problem: Fatigue  Goal: Verbalize increase energy and improved vitality  Outcome: Ongoing     Problem: Patient Education: Go to Patient Education Activity  Goal: Patient/Family Education  Outcome: Ongoing     Problem: Skin Integrity:  Goal: Will show no infection signs and symptoms  Description: Will show no infection signs and symptoms  Outcome: Ongoing  Goal: Absence of new skin breakdown  Description: Absence of new skin breakdown  Outcome: Ongoing     Problem: Falls - Risk of:  Goal: Will remain free from falls  Description: Will remain free from falls  Outcome: Ongoing  Goal: Absence of physical injury  Description: Absence of physical injury  Outcome: Ongoing     Problem: Confusion - Acute:  Goal: Absence of continued neurological deterioration signs and symptoms  Description: Absence of continued neurological deterioration signs and symptoms  Outcome: Ongoing  Goal: Mental status will be restored to baseline  Description: Mental status will be restored to baseline  Outcome: Ongoing     Problem: Discharge Planning:  Goal: Ability to perform activities of daily living will improve  Description: Ability to perform activities of daily living will improve  Outcome: Ongoing  Goal: Participates in care planning  Description: Participates in care planning  Outcome: Ongoing     Problem: Injury - Risk of, Physical Injury:  Goal: Will remain free from falls  Description: Will remain free from falls  Outcome: Ongoing  Goal: Absence of physical injury  Description: Absence of physical injury  Outcome: Ongoing     Problem: Mood - Altered:  Goal: Mood stable  Description: Mood stable  Outcome: Ongoing  Goal: Absence of abusive behavior  Description: Absence of abusive behavior  Outcome: Ongoing  Goal: Verbalizations of feeling emotionally comfortable while being cared for will increase  Description: Verbalizations of feeling emotionally comfortable while being cared for will increase  Outcome: Ongoing     Problem: Psychomotor Activity - Altered:  Goal: Absence of psychomotor disturbance signs and symptoms  Description: Absence of psychomotor disturbance signs and symptoms  Outcome: Ongoing     Problem: Sensory Perception - Impaired:  Goal: Demonstrations of improved sensory functioning will increase  Description: Demonstrations of improved sensory functioning will increase  Outcome: Ongoing  Goal: Decrease in sensory misperception frequency  Description: Decrease in sensory misperception frequency  Outcome: Ongoing  Goal: Able to refrain from responding to false sensory perceptions  Description: Able to refrain from responding to false sensory perceptions  Outcome: Ongoing  Goal: Demonstrates accurate environmental perceptions  Description: Demonstrates accurate environmental perceptions  Outcome: Ongoing  Goal: Able to distinguish between reality-based and nonreality-based thinking  Description: Able to distinguish between reality-based and nonreality-based thinking  Outcome: Ongoing  Goal: Able to interrupt nonreality-based thinking  Description: Able to interrupt nonreality-based thinking  Outcome: Ongoing     Problem: Sleep Pattern Disturbance:  Goal: Appears well-rested  Description: Appears well-rested  Outcome: Ongoing     Problem: Restraint Use - Nonviolent/Non-Self-Destructive Behavior:  Goal: Absence of restraint indications  Description: Absence of restraint indications  Outcome: Met This Shift  Goal: Absence of restraint-related injury  Description: Absence of restraint-related injury  Outcome: Met This Shift     Problem: HEMODYNAMIC STATUS  Goal: Patient has stable vital signs and fluid balance  Outcome: Ongoing       Problem: SKIN INTEGRITY  Goal: Skin integrity is maintained or improved  8/1/2020 0545 by Amanda Francis RN  Outcome: Ongoing     Problem: NUTRITION  Goal: Nutritional status is improving  Outcome: Ongoing

## 2020-08-01 NOTE — FLOWSHEET NOTE
SPIRITUAL CARE DEPARTMENT - [facility]  PROGRESS NOTE    Shift date: 08/01/2020  Shift day: Saturday   Shift # 2    Room # 2436/8408-91   Name: Nirmal Woodson            Age: 60 y.o. Gender: male          Evangelical:    Place of Christianity:     Referral: Nurse intitiated by physicians code status     Admit Date & Time: 7/27/2020  8:06 PM    PATIENT/EVENT DESCRIPTION:  Nirmal Woodson is a 60 y.o. male  Patient entered through ER. Has deteriorated since physically. Intubated and having significant system difficutlies. SPIRITUAL ASSESSMENT/INTERVENTION:  Began working with this at about 2:30 pm 08/01/2020. Visited with patient nurse, Rohini Leyva. Touched base with , Social Work and left message for  (returns Monday). Patient has 7 local and known adult children. Reported to have up to 10 more adult children from ? 30 years ago, all estranged. No success through varius attempts in finding names or making contacts beyond local 7. Called Dr. Palomo Grant and discussed the situation and best approaches. Script: In [place], if there is no spouse, medical decisions are the responsibility of all the adult children (over 25) of the patient. A number of the children have been talking about transferring their responsibility to one child--in this case Grace--as their surrogate, to speak to the hospital staff on such decisions. Procedure:  Called each  identified  Child and asked about this. With  H. as witness,  G. called and spoke to all the children he could reach asking this question. With several, we texted those we could neither reach or leave a message and were called back in three of these cases. Answering yes to this question were:  José Miguel Olvera, [facility], Amy Patel, and Mima Nagy. Wandy Calero was not reached by this time.   Ricky Mensah answered no to this question, saying he wanted to be involved in any such discussions and to speak for himself. Talked with Dr. Pabon Villisca Conversed about some family conference and reporting time.  to attempt a family video call for Galen Aug 2 around 3 PM.       SPIRITUAL CARE FOLLOW-UP PLAN:  Chaplains will remain available to offer spiritual and emotional support as needed. Electronically signed by Jeannie Reilly, on 8/1/2020 at 6:46 PM.  [facility]  [phone]                       08/01/20 1430   Encounter Summary   Services provided to: Family; Patient not available   Referral/Consult From: Physician;Nurse   Support System Children;Family members   Continue Visiting   (08/01/2020)   Complexity of Encounter High   Length of Encounter 30 minutes;4 hours   Spiritual Assessment Completed Yes   Routine   Type Follow up   Assessment Approachable;Tearful;Grieving; Anxious; Fearful;Spiritual struggle   Intervention Active listening;Explored feelings, thoughts, concerns;Explored coping resources;Nurtured hope;Prayer;[place];Sustaining presence/ Ministry of presence   Outcome Acceptance;Comfort;Expressed gratitude;Engaged in conversation;Expressed feelings/needs/concerns;Coping;Tearful;Grieving

## 2020-08-01 NOTE — PLAN OF CARE
Problem: OXYGENATION/RESPIRATORY FUNCTION  Goal: Patient will maintain patent airway  7/31/2020 2033 by GAYATHRI ValadezP  Outcome: Ongoing  7/31/2020 1645 by Jenifer Higuera RCP  Outcome: Ongoing     Problem: OXYGENATION/RESPIRATORY FUNCTION  Goal: Patient will achieve/maintain normal respiratory rate/effort  Description: Respiratory rate and effort will be within normal limits for the patient  7/31/2020 2033 by GAYATHRI ValadezP  Outcome: Ongoing  7/31/2020 1645 by Jenifer Higuera RCP  Outcome: Ongoing     Problem: MECHANICAL VENTILATION  Goal: Mobility/activity is maintained at optimum level for patient  7/31/2020 2033 by Deana Lopez RCP  Outcome: Ongoing  7/31/2020 1645 by Jenifer Higuera RCP  Outcome: Ongoing     Problem: MECHANICAL VENTILATION  Goal: Ability to express needs and understand communication  7/31/2020 2033 by Deana Lopez RCP  Outcome: Ongoing  7/31/2020 1645 by Jenifer Higuera RCP  Outcome: Ongoing     Problem: MECHANICAL VENTILATION  Goal: Patient will maintain patent airway  7/31/2020 2033 by Deana Lopez RCP  Outcome: Ongoing  7/31/2020 1645 by Jenifer Higuera RCP  Outcome: Ongoing     Problem: MECHANICAL VENTILATION  Goal: Patient will maintain patent airway  7/31/2020 2033 by Deana Lopez RCP  Outcome: Ongoing  7/31/2020 1645 by Jenifer Higuera RCP  Outcome: Ongoing     Problem: MECHANICAL VENTILATION  Goal: Oral health is maintained or improved  7/31/2020 2033 by Deana Lopez RCP  Outcome: Ongoing  7/31/2020 1645 by Jenifer Higuera RCP  Outcome: Ongoing     Problem: MECHANICAL VENTILATION  Goal: ET tube will be managed safely  7/31/2020 2033 by GAYATHRI ValadezP  Outcome: Ongoing  7/31/2020 1645 by Jenifer Higuera RCP  Outcome: Ongoing     Problem: SKIN INTEGRITY  Goal: Skin integrity is maintained or improved  Outcome: Ongoing

## 2020-08-01 NOTE — PROGRESS NOTES
Nephrology Progress Note      SUBJECTIVE    Patient examined outside room and plan of care discussed with nurse. Continues on continuous EEG. Prognosis poor. Discussed case with primary reason to talk to the family about CODE STATUS change due to EEG shows evidence of severely abnormal diffuse cortical dysfunction. Pt presented with unresponsiveness and tested + for COVID , also underwent CTA Chest which was negative for PE. He was seen by neurology for possible seizures. Pt did have an episode of cardiopulmonary arrest and needed CPR. Has required levophed and is intubated. Not having any purposeful movements. Concern regarding anoxic brain injury. EEG continues as noted above. Urine output remains poor less than 250 mL's recorded out in the last 24 hours +8.5 L since admission. This is likely from a combination of contrast exposure and hypoperfusion with ischemic injury. Baseline creat is normal   Labs reviewed. Patient continues to have creatinine rise was 4.6 yesterday now 6. Normokalemia. BUN is 65. OBJECTIVE      CURRENT TEMPERATURE:  Temp: 100 °F (37.8 °C)  MAXIMUM TEMPERATURE OVER 24HRS:  Temp (24hrs), Av.7 °F (37.6 °C), Min:99.5 °F (37.5 °C), Max:100 °F (37.8 °C)    CURRENT RESPIRATORY RATE:  Resp: 24  CURRENT PULSE:  Pulse: 115  CURRENT BLOOD PRESSURE:  BP: 128/71  24HR BLOOD PRESSURE RANGE:  Systolic (34JXG), FFV:595 , Min:122 , JYN:623   ; Diastolic (44SKJ), ZDL:31, Min:71, Max:97    24HR INTAKE/OUTPUT:      Intake/Output Summary (Last 24 hours) at 2020 0946  Last data filed at 2020 0400  Gross per 24 hour   Intake 1177 ml   Output 213 ml   Net 964 ml     WEIGHT :  No data found.   PHYSICAL EXAM      GENERAL APPEARANCE: Patient viewed from outside the room nothing further due to preservation of PPE in the setting of COVID    CURRENT MEDICATIONS      famotidine (PEPCID) injection 20 mg, Daily  metoprolol (LOPRESSOR) injection 5 mg, Q4H PRN  insulin lispro (HUMALOG) injection vial 0-12 Units, Q6H  midazolam (VERSED) 1 mg/mL in D5W infusion, Continuous  insulin glargine (LANTUS) injection vial 10 Units, Nightly  0.45 % sodium chloride infusion, Continuous  sodium chloride 50 mEq in sterile water infusion, Continuous  metoprolol (LOPRESSOR) injection 2.5 mg, Q6H PRN  hydrocortisone sodium succinate PF (SOLU-CORTEF) injection 50 mg, Q6H  norepinephrine (LEVOPHED) 16 mg in sodium chloride 0.9 % 250 mL infusion, Continuous  EPINEPHrine (EPINEPHrine HCL) 5 mg in dextrose 5 % 250 mL infusion, Continuous  levothyroxine (SYNTHROID) injection 50 mcg, Daily    And  sodium chloride (PF) 0.9 % injection 5 mL, Daily  sodium chloride flush 0.9 % injection 10 mL, 2 times per day  potassium chloride 20 mEq/50 mL IVPB (Central Line), PRN  sodium chloride flush 0.9 % injection 10 mL, 2 times per day  sodium chloride flush 0.9 % injection 10 mL, PRN  acetaminophen (TYLENOL) tablet 650 mg, Q6H PRN    Or  acetaminophen (TYLENOL) suppository 650 mg, Q6H PRN  polyethylene glycol (GLYCOLAX) packet 17 g, Daily PRN  promethazine (PHENERGAN) tablet 12.5 mg, Q6H PRN    Or  ondansetron (ZOFRAN) injection 4 mg, Q6H PRN  nicotine (NICODERM CQ) 21 MG/24HR 1 patch, Daily PRN  LORazepam (ATIVAN) injection 2 mg, Q4H PRN  diphenhydrAMINE (BENADRYL) injection 25 mg, Q6H PRN  aspirin EC tablet 81 mg, Daily  atorvastatin (LIPITOR) tablet 40 mg, Nightly  glucose (GLUTOSE) 40 % oral gel 15 g, PRN  dextrose 50 % IV solution, PRN  glucagon (rDNA) injection 1 mg, PRN  dextrose 5 % solution, PRN  levetiracetam (KEPPRA) 1000 mg/100 mL IVPB, Q12H          LABS      CBC:   Recent Labs     07/30/20  0419 07/31/20  0400 08/01/20  0536   WBC 11.8* 16.2* 15.9*   RBC 4.40 4.05* 2.85*   HGB 12.5* 11.1* 8.0*   HCT 36.5* 33.4* 24.9*   MCV 83.0 82.5* 87.4   MCH 28.4 27.4 28.1   MCHC 34.2 33.2 32.1   RDW 14.9* 15.5* 15.7*   PLT See Reflexed IPF Result See Reflexed IPF Result See Reflexed IPF Result   MPV NOT REPORTED NOT REPORTED NOT REPORTED BMP:   Recent Labs     07/30/20  0940  07/31/20  0400  07/31/20  1520 08/01/20  0105 08/01/20  0536   *   < > 145*   < > 146* 142 144   K 3.6*   < > 4.5   < > 4.4 4.6 4.6   *   < > 108*   < > 107 104 104   CO2 20   < > 21   < > 22 20 20   BUN 33*  --  41*  --   --   --  65*   CREATININE 3.69*  --  4.68*  --   --   --  6.04*   GLUCOSE 350*  --  156*  --   --   --  94   CALCIUM 7.8*  --  7.4*  --   --   --  7.1*    < > = values in this interval not displayed.       PHOSPHORUS:    Recent Labs     07/30/20  0940   PHOS 2.3*     MAGNESIUM:   Recent Labs     07/30/20  0419 07/30/20  0940 07/31/20  1520   MG 1.7 1.5* 1.4*     FERRITIN:    Lab Results   Component Value Date    FERRITIN 471 07/28/2020     JOEL:   Lab Results   Component Value Date    JOEL NEGATIVE 07/30/2020       SPEP:   Lab Results   Component Value Date    PROT 4.3 07/30/2020    ALBCAL PENDING 07/30/2020    ALBPCT PENDING 07/30/2020    LABALPH PENDING 07/30/2020    LABALPH PENDING 07/30/2020    A1PCT PENDING 07/30/2020    A2PCT PENDING 07/30/2020    LABBETA PENDING 07/30/2020    BETAPCT PENDING 07/30/2020    GAMGLOB PENDING 07/30/2020    GGPCT PENDING 07/30/2020    PATH PENDING 07/30/2020     C3:   Lab Results   Component Value Date    C3 70 (L) 07/30/2020     C4:   Lab Results   Component Value Date    C4 14 07/30/2020       URINE CREATININE:    Lab Results   Component Value Date    LABCREA 24.4 07/30/2020     URINE EOSINOPHILS:   Lab Results   Component Value Date    UREO NONE SEEN 07/30/2020     URINALYSIS:  U/A:   Lab Results   Component Value Date    NITRU NEGATIVE 07/27/2020    COLORU YELLOW 07/27/2020    PHUR 5.0 07/27/2020    WBCUA None 07/27/2020    RBCUA 0 TO 2 07/27/2020    MUCUS NOT REPORTED 07/27/2020    TRICHOMONAS NOT REPORTED 07/27/2020    YEAST NOT REPORTED 07/27/2020    BACTERIA NOT REPORTED 07/27/2020    SPECGRAV 1.052 07/27/2020    LEUKOCYTESUR NEGATIVE 07/27/2020    UROBILINOGEN Normal 07/27/2020    BILIRUBINUR NEGATIVE 07/27/2020    GLUCOSEU NEGATIVE 07/27/2020    KETUA NEGATIVE 07/30/2020    AMORPHOUS NOT REPORTED 07/27/2020         ASSESSMENT      1. Acute Kidney Injury: secondary to ischemic and nephrotoxic acute tubular necrosis   Baseline 0.9 peaked up to 6 today. 2.  Status post cardiorespiratory arrest unclear etiology work-up in progress  3. Respiratory failure post cardiac arrest on the ventilator   4. COVID-19 positive  5. Possible seizures    PLAN      1. May be CODE STATUS change, but not confirmed and/or conversation still to occur with family member today. 2. Tel conversation with daughter and explained about RRT. I explained that dialysis will be futile if there is irreversible brain damage. She will talk to neurology and siblings and will arrive at a decision. 3. Labs in evening. IV Lasix   4. Following. 5. Prognosis is poor. Please do not hesitate to call with questions. Electronically signed by Jair Rocha CNP, APRN - CNP on 8/1/2020 at 9:46 AM  Attending Physician Statement  I have discussed the care of Fidelia Sanabria, including pertinent history and exam findings,  with the CNP. I have reviewed the key elements of all parts of the encounter with the CNP. I agree with the assessment, plan and orders as documented.   Ellen Moore MD, MRCP Josefina Donnelly, FACP   8/1/2020 4:08 PM    Nephrology [address] [place] [address]

## 2020-08-01 NOTE — PROGRESS NOTES
08/01/2020     08/01/2020    CREATININE 6.04 (HH) 08/01/2020    BUN 65 (H) 08/01/2020    CO2 20 08/01/2020    TSH <0.01 (L) 07/28/2020    INR 1.0 07/28/2020    LABA1C 5.3 07/28/2020     Lab Results   Component Value Date    CHOL 150 07/28/2020     Lab Results   Component Value Date    TRIG 131 07/28/2020     Lab Results   Component Value Date    HDL 24 (L) 07/28/2020     Lab Results   Component Value Date    LDLCHOLESTEROL 100 07/28/2020     Lab Results   Component Value Date    VLDL NOT REPORTED 07/28/2020     Lab Results   Component Value Date    CHOLHDLRATIO 6.3 (H) 07/28/2020         DIAGNOSTIC DATA:  CT HEAD (7/27/2020): No acute intracranial abnormality     MRI BRAIN (7/31/2020):   Evidence of diffusely increased diffusion signal throughout the cortex;    hypoxic ischemic injury in the appropriate clinical setting. CTA HEAD & NECK (7/27/2020): Unremarkable      LTME (7/28 - 7/29/2020):  Day 1: During this day of recording no events were recorded. The interictal EEG was abnormal due to diffuse polymorphic delta theta slowing suggesting moderate to severe encephalopathy. Near continuous frontally dominant rhythmic delta activity appeared to be on ictal-interictal spectrum and was concerning for subclinical seizures    Day 2: During this day of recording no events were recorded. The interictal EEG was abnormal due to diffuse polymorphic delta theta slowing suggesting moderate to severe encephalopathy. Near continuous frontally dominant rhythmic delta activity appeared to be on ictal-interictal spectrum and was concerning for subclinical seizures. The EEG was essentially unchanged from yesterday      EEG (7/31/2020): This EEG was severely abnormal.  No clear discernible cerebral activity was seen. This finding is suggestive of diffuse cortical dysfunction or can be from sedation medication effect      REPEAT LTME (7/31-  No events were recorded.  The interictal EEG was severely abnormal due to

## 2020-08-01 NOTE — PROCEDURES
EEG REPORT       Patient: Christopher Munson Age: 60 y.o. MRN: [id number]    Date: 7/27/2020  Referring Provider: No ref. provider found    History: This routine 30 minute scalp EEG was recorded with video- monitoring for a 60 y.o.. male who presented with encephalopathy. This EEG was performed to evaluate for focal and epileptiform abnormalities.      Christopher Munson   Current Facility-Administered Medications   Medication Dose Route Frequency Provider Last Rate Last Dose    [START ON 8/1/2020] famotidine (PEPCID) injection 20 mg  20 mg Intravenous Daily Bell Ferguson MD        metoprolol (LOPRESSOR) injection 5 mg  5 mg Intravenous Q4H PRN Kaur Rowley MD   5 mg at 07/31/20 1531    insulin lispro (HUMALOG) injection vial 0-12 Units  0-12 Units Subcutaneous Q6H Hamilton Olszewski, APRN - CNP   2 Units at 07/31/20 0600    midazolam (VERSED) 1 mg/mL in D5W infusion  1 mg/hr Intravenous Continuous London Rivera DO   Stopped at 07/31/20 1450    insulin glargine (LANTUS) injection vial 10 Units  10 Units Subcutaneous Nightly Nicolle Lockhart MD   10 Units at 07/30/20 1134    0.45 % sodium chloride infusion   Intravenous Continuous Nicolle Lockhart MD   Stopped at 07/30/20 1841    sodium chloride 50 mEq in sterile water infusion   Intravenous Continuous Spike Quezada  mL/hr at 07/31/20 1615      metoprolol (LOPRESSOR) injection 2.5 mg  2.5 mg Intravenous Q6H PRN Bell Ferguson MD   2.5 mg at 07/29/20 1158    hydrocortisone sodium succinate PF (SOLU-CORTEF) injection 50 mg  50 mg Intravenous Q6H Bell Ferguson MD   50 mg at 07/31/20 1453    norepinephrine (LEVOPHED) 16 mg in sodium chloride 0.9 % 250 mL infusion  2 mcg/min Intravenous Continuous Bell Ferguson MD   Stopped at 07/30/20 0445    EPINEPHrine (EPINEPHrine HCL) 5 mg in dextrose 5 % 250 mL infusion  1 mcg/min Intravenous Continuous Bell Ferguson MD   Stopped at 07/29/20 1910    levothyroxine (SYNTHROID) injection 50 mcg  50 mcg Intravenous Daily Shakira Faulkner MD   50 mcg at 07/31/20 0751    And    sodium chloride (PF) 0.9 % injection 5 mL  5 mL Intravenous Daily Shakira Faulkner MD   5 mL at 07/31/20 0756    sodium chloride flush 0.9 % injection 10 mL  10 mL Intravenous 2 times per day Madelin Emanuel MD   10 mL at 07/31/20 0800    potassium chloride 20 mEq/50 mL IVPB (Central Line)  20 mEq Intravenous PRN Madelin Emanuel MD   Stopped at 07/30/20 1008    sodium chloride flush 0.9 % injection 10 mL  10 mL Intravenous 2 times per day Sherl Fitccarlos, APRN - CNP   10 mL at 07/31/20 0800    sodium chloride flush 0.9 % injection 10 mL  10 mL Intravenous PRN Sherl Fitch, APRN - CNP        acetaminophen (TYLENOL) tablet 650 mg  650 mg Oral Q6H PRN Sherl Fitch, APRN - CNP   650 mg at 07/31/20 1715    Or    acetaminophen (TYLENOL) suppository 650 mg  650 mg Rectal Q6H PRN Sherl Fitch, APRN - CNP   650 mg at 07/28/20 2322    polyethylene glycol (GLYCOLAX) packet 17 g  17 g Oral Daily PRN Sherl Fitch, APRN - CNP        promethazine (PHENERGAN) tablet 12.5 mg  12.5 mg Oral Q6H PRN Sherl Fitch, APRN - CNP        Or    ondansetron (ZOFRAN) injection 4 mg  4 mg Intravenous Q6H PRN Sherl Fitch, APRN - CNP        nicotine (NICODERM CQ) 21 MG/24HR 1 patch  1 patch Transdermal Daily PRN Sherl Fitch, APRN - CNP        LORazepam (ATIVAN) injection 2 mg  2 mg Intravenous Q4H PRN Sherl Fitch, APRN - CNP        diphenhydrAMINE (BENADRYL) injection 25 mg  25 mg Intravenous Q6H PRN Venus Ruiz, APRN - CNP   25 mg at 07/28/20 2321    aspirin EC tablet 81 mg  81 mg Oral Daily Ananda Christianson MD   81 mg at 07/30/20 0816    atorvastatin (LIPITOR) tablet 40 mg  40 mg Oral Nightly Ananda Christianson MD   40 mg at 07/30/20 2049    glucose (GLUTOSE) 40 % oral gel 15 g  15 g Oral PRN Madelin Emanuel MD        dextrose 50 % IV solution  12.5 g Intravenous PRN Madelin Emanuel MD  glucagon (rDNA) injection 1 mg  1 mg Intramuscular PRN Kely Neri MD        dextrose 5 % solution  100 mL/hr Intravenous PRN Kely Neri MD        levetiracetam (KEPPRA) 1000 mg/100 mL IVPB  1,000 mg Intravenous Q12H Radha Garrison MD   Stopped at 07/31/20 0816       Technical Description: This is a 21 channel digital EEG recording with time-locked video. Electrodes were placed in accordance with the 10-20 International System of Electrode Placement. Single lead EKG monitoring as well as temporal electrodes were included. The patient was not sleep deprived. This recording was obtained during wakefulness. EEG Description: There was no clear discernible cerebral activity. The background was severely and diffusely suppressed. Intermittent EKG artifact was seen over leads. Activation procedures were not performed. The EEG did not show any status change or any spontaneous variability. The EKG channel demonstrated a normal sinus rhythm. Interpretation  This EEG was abnormal due to diffusely suppressed background. Clinical correlation  This EEG was severely abnormal.  No clear discernible cerebral activity was seen. This finding is suggestive of diffuse cortical dysfunction or can be from sedation medication effect.     Elsi Dickson MD    Diplomate, American Board of Psychiatry and Neurology  Diplomate, American Board of Clinical Neurophysiology  Diplomate, American Board of Epilepsy

## 2020-08-01 NOTE — PLAN OF CARE
Problem: Airway Clearance - Ineffective  Goal: Achieve or maintain patent airway  8/1/2020 1006 by Edie Thomas RCP  Outcome: Ongoing  8/1/2020 0545 by Placido Keita RN  Outcome: Ongoing     Problem: Gas Exchange - Impaired  Goal: Absence of hypoxia  8/1/2020 1006 by GAYATHRI RosalesP  Outcome: Ongoing  8/1/2020 0545 by Placido Keita RN  Outcome: Ongoing  Goal: Promote optimal lung function  8/1/2020 1006 by GAYATHRI RosalesP  Outcome: Ongoing  8/1/2020 0545 by Placido Keita RN  Outcome: Ongoing     Problem: Breathing Pattern - Ineffective  Goal: Ability to achieve and maintain a regular respiratory rate  8/1/2020 1006 by Edie Thomas RCP  Outcome: Ongoing  8/1/2020 0545 by Placido Keita RN  Outcome: Ongoing     Problem: OXYGENATION/RESPIRATORY FUNCTION  Goal: Patient will maintain patent airway  8/1/2020 1006 by GAYATHRI RosalesP  Outcome: Ongoing  7/31/2020 2033 by Alex Zuñiga RCP  Outcome: Ongoing  Goal: Patient will achieve/maintain normal respiratory rate/effort  Description: Respiratory rate and effort will be within normal limits for the patient  8/1/2020 1006 by Edie Thomas RCP  Outcome: Ongoing  7/31/2020 2033 by Alex Zuñiga RCP  Outcome: Ongoing     Problem: MECHANICAL VENTILATION  Goal: Mobility/activity is maintained at optimum level for patient  8/1/2020 1006 by Edie Thomas RCP  Outcome: Ongoing  7/31/2020 2033 by Alex Zuñiga RCP  Outcome: Ongoing  Goal: Ability to express needs and understand communication  8/1/2020 1006 by Edie Thomas RCP  Outcome: Ongoing  7/31/2020 2033 by Alex Zuñiga RCP  Outcome: Ongoing  Goal: Patient will maintain patent airway  8/1/2020 1006 by Edie Thomas RCP  Outcome: Ongoing  7/31/2020 2033 by Alex Zuñiga RCP  Outcome: Ongoing  Goal: Patient will maintain patent airway  8/1/2020 1006 by GAYATHRI RosalesP  Outcome: Ongoing  7/31/2020 2033 by Alex Zuñiga RCP  Outcome: Ongoing  Goal: Oral health is maintained or improved  8/1/2020 1006 by Elyssa Galan, RCP  Outcome: Ongoing  7/31/2020 2033 by Grady Higginbotham, RCP  Outcome: Ongoing  Goal: ET tube will be managed safely  8/1/2020 1006 by Elyssa Galan, RCP  Outcome: Ongoing  7/31/2020 2033 by Grady Higginbotham, RCP  Outcome: Ongoing

## 2020-08-02 NOTE — PLAN OF CARE
Problem: Airway Clearance - Ineffective  Goal: Achieve or maintain patent airway  8/2/2020 0902 by Bell Mode, RCP  Outcome: Ongoing  8/1/2020 2232 by Genny Nair RCP  Outcome: Ongoing     Problem: Gas Exchange - Impaired  Goal: Absence of hypoxia  8/2/2020 0902 by Bell Mode, RCP  Outcome: Ongoing  8/1/2020 2232 by Genny Nair RCP  Outcome: Ongoing  Goal: Promote optimal lung function  8/2/2020 0902 by Bell Mode, RCP  Outcome: Ongoing  8/1/2020 2232 by Genny Nair RCP  Outcome: Ongoing     Problem: Breathing Pattern - Ineffective  Goal: Ability to achieve and maintain a regular respiratory rate  8/2/2020 0902 by Bell Mode, RCP  Outcome: Ongoing  8/1/2020 2232 by Genny Nair RCP  Outcome: Ongoing     Problem: OXYGENATION/RESPIRATORY FUNCTION  Goal: Patient will maintain patent airway  8/2/2020 0902 by Bell Mode, RCP  Outcome: Ongoing  8/1/2020 2232 by Genny Nair RCP  Outcome: Ongoing  Goal: Patient will achieve/maintain normal respiratory rate/effort  Description: Respiratory rate and effort will be within normal limits for the patient  8/1/2020 2232 by Genny Nair, RCP  Outcome: Ongoing     Problem: MECHANICAL VENTILATION  Goal: Mobility/activity is maintained at optimum level for patient  8/2/2020 0902 by Bell Mode, RCP  Outcome: Ongoing  8/1/2020 2232 by Genny Nair RCP  Outcome: Ongoing  Goal: Ability to express needs and understand communication  8/2/2020 0902 by Bell Saurav, RCP  Outcome: Ongoing  8/1/2020 2232 by Genny Nair RCP  Outcome: Ongoing  Goal: Patient will maintain patent airway  8/2/2020 0902 by Bell Mode, RCP  Outcome: Ongoing  8/1/2020 2232 by GAYATHRI ThorntonP  Outcome: Ongoing  Goal: Patient will maintain patent airway  8/2/2020 0902 by Bell Mode, RCP  Outcome: Ongoing  8/1/2020 2232 by GAYATHRI ThorntonP  Outcome: Ongoing  Goal: Oral health is maintained or improved  8/2/2020 0902 by Bell Mode, RCP  Outcome: Ongoing  8/1/2020 2232 by Frank Latham RCP  Outcome: Ongoing  Goal: ET tube will be managed safely  8/2/2020 0902 by Jurline Severin, RCP  Outcome: Ongoing  8/1/2020 2232 by Frank Latham RCP  Outcome: Ongoing     Problem: SKIN INTEGRITY  Goal: Skin integrity is maintained or improved  8/2/2020 0902 by Jurline Severin, RCP  Outcome: Ongoing  8/1/2020 2232 by Frank Latham RCP  Outcome: Ongoing

## 2020-08-02 NOTE — PLAN OF CARE
Problem: OXYGENATION/RESPIRATORY FUNCTION  Goal: Patient will maintain patent airway  8/2/2020 1951 by Errol Lamb, GAYATHRIP  Outcome: Ongoing  8/2/2020 0921 by Robin Cabezas RN  Outcome: Ongoing  8/2/2020 0902 by Timothy Purdy RCP  Outcome: Ongoing  Goal: Patient will achieve/maintain normal respiratory rate/effort  Description: Respiratory rate and effort will be within normal limits for the patient  8/2/2020 1951 by Errol Lamb, GAYATHRIP  Outcome: Ongoing  8/2/2020 0921 by Robin Cabezas RN  Outcome: Ongoing     Problem: MECHANICAL VENTILATION  Goal: Mobility/activity is maintained at optimum level for patient  8/2/2020 1951 by Errol Lamb, GAYATHRIP  Outcome: Ongoing  8/2/2020 0921 by Robin Cabezas RN  Outcome: Ongoing  8/2/2020 0902 by Timothy Purdy RCP  Outcome: Ongoing  Goal: Ability to express needs and understand communication  8/2/2020 1951 by GAYATHRI MimsP  Outcome: Ongoing  8/2/2020 0921 by Robin Cabezas RN  Outcome: Ongoing  8/2/2020 0902 by Timothy Puryd RCP  Outcome: Ongoing  Goal: Patient will maintain patent airway  8/2/2020 1951 by Errol Lamb, RCP  Outcome: Ongoing  8/2/2020 0921 by Robin Cabezas RN  Outcome: Ongoing  8/2/2020 0902 by Timothy Purdy RCP  Outcome: Ongoing  Goal: Patient will maintain patent airway  8/2/2020 1951 by Errol Lamb RCP  Outcome: Ongoing  8/2/2020 0921 by Robin Cabezas RN  Outcome: Ongoing  8/2/2020 0902 by Timothy Purdy RCP  Outcome: Ongoing  Goal: Oral health is maintained or improved  8/2/2020 1951 by Errol Lamb RCP  Outcome: Ongoing  8/2/2020 0921 by Robin Cabezas RN  Outcome: Ongoing  8/2/2020 0902 by Timothy Purdy RCP  Outcome: Ongoing  Goal: ET tube will be managed safely  8/2/2020 1951 by Errol Lamb RCP  Outcome: Ongoing  8/2/2020 0921 by Robin Cabezas RN  Outcome: Ongoing  8/2/2020 0902 by Timothy Purdy RCP  Outcome: Ongoing     Problem: SKIN INTEGRITY  Goal: Skin integrity is maintained or improved  8/2/2020 1951 by Errol Lamb RCP  Outcome: Ongoing  8/2/2020 0921 by Marta Cisse

## 2020-08-02 NOTE — CONSULTS
..    Palliative Care Inpatient Consult    NAME:  Forrest Pallas RECORD NUMBER:  [id number]  AGE: 60 y.o. GENDER: male  : 1960  TODAY'S DATE:  2020    Reasons for Consultation:    Symptom and/or pain management  Provision of information regarding PC and/or hospice philosophies  Complex, time-intensive communication and interdisciplinary psychosocial support  Clarification of goals of care and/or assistance with difficult decision-making  Guidance in regards to resources and transition(s)    Members of PC team contributing to this consultation are :  Clarence Woodward, Palliative Care CNP  History of Present Illness     The patient is a 60 y.o. Non-/non  male who presents with Altered Mental Status      Referred to Palliative Care by   [x] Physician   [] Nursing  [] Family Request   [] Other:       He was admitted to the Primary service for AMS (altered mental status) [R41.82]. His hospital course has been associated with COVID-19 virus infection, Anemia, Thrombocytopenia, Acute renal failure, Diarrhea, New onset of A-fib, Acute respiratory failure requiring intubation, Mx cardiac arrest with ROSC, H/o pituitary tumor, metabolic encephalopathy, Possible new onset of seizure, shock, anoxic brain injury, hyponatremia, lactic acidosis, and metabolic acidosis. The patient has a complicated medical history and has been hospitalized since 2020  8:06 PM.  I reviewed patient's EMR, spoke to [employer], and multiple family members to collect history. Patient was at work and had passed out with loss of consciousness for unknown known time. It is unknown if he hit his head. He had loss of control of bowel/bladder. No witnessed seizure or history. He was approximately last well-known at 2 PM on the day of arrival.  Patient was brought in via EMS and temperature once he arrived in the ER was 100.7. Patient had altered mental status and was a stroke alert.   CT of brain was negative for any acute findings. CTA of head and neck showed no large vessel occlusion or hemodynamic stenosis. Multivessel degenerative changes of cervical spine noted. CTA of chest showed no PE or acute pulmonary abnormality. Patient was positive for COVID and isolation was initiated. EKG revealed normal sinus rhythm with nonspecific T wave and ST changes. Admitting labs included; sodium 115, potassium greater than 12, ionized calcium 0.66, lactic 1.68, CO2 16, lactic acid 2, procalcitonin 0.19, UA was negative, drug screen was negative, and COVID was positive. Patient was loaded with Keppra 1000 mg in ED and Keppra 500 mg twice daily started. Patient was admitted to the [facility] unit. Neurology and ID consulted. EEG completed and revealed abnormal due to diffuse probably more rythmic delta theta slowing suggestive moderate to severe encephalopathy near continuous frontally dominant rhythmic delta activity appeared to be on ictal- interictal spectrum and was concerning of subclinical seizures. Patient had PEA arrest with successful ROSC after 2 rounds of CPR and epi and was intubated. Patient was moved to the ICU COVID unit and after transfer patient had arrested again with ROSC obtained after CPR and Epi. Patient had went into A. fib and was given 150 mg of amiodarone and converted. Cardiology and pulmonary consulted. Renal ultrasound on 30th revealed medical renal disease no hydronephrosis or nephrolithiasis. No cardiac echo seen. Repeat EEG after multiple cardiac arrest, revealed diffusely suppressed background. No clear discernible cerebral activity seen. Suggest diffuse cortical dysfunction. MRI brain revealed diffusely increased diffusion signal throughout the cortex. Findings can be associated with hypoxic ischemic injury. Per notes patient has not followed up after brain surgery for pituitary tumor and has been noncompliant with medication.   Primary care Dr. Annette trotter serve me yesterday and asked me to see patient this morning for new consult.  has been assisting with trying to identify all patient's biological children. No HCPOA has been identified. Palliative care consulted for review of goals, CODE STATUS, and support. 8/2 labs: Fibrinogen 471, d-dimer 13.02, BUN 99/CR 7.34, calcium 7.1, CO2 18, hemoglobin 7.4, white blood count 13.4, and platelet fluorescence is 46.       Active Hospital Problems    Diagnosis Date Noted    LEILA (acute kidney injury) ([facility] [N17.9]     Anoxic encephalopathy ([facility] [G93.1]     Shock circulatory ([facility] [R57.9]     Pituitary insufficiency (HCC) [E23.0]     High anion gap metabolic acidosis [B41.0]     New onset atrial fibrillation (HCC) [I48.91]     Pulseless electrical activity (HCC) [I46.9]     COVID-19 virus infection [U07.1] 07/28/2020    History of pituitary tumor [Z87.898] 07/28/2020    Seizure ([facility] [address] [facility] [R56.9] 07/28/2020    Secondary hypothyroidism [E03.8]     Somnolence [R40.0]     Toxic metabolic encephalopathy [S64] 07/27/2020       PAST MEDICAL HISTORY      Diagnosis Date    Depression     ED (erectile dysfunction)     Hypothyroid     Migraine     Pituitary tumor     Type 2 diabetes mellitus (HCC)     resolved with 50# weight loss       PAST SURGICAL HISTORY  Past Surgical History:   Procedure Laterality Date    PITUITARY EXCISION  10/05/2016       SOCIAL HISTORY  Social History     Tobacco Use    Smoking status: Never Smoker    Smokeless tobacco: Never Used   Substance Use Topics    Alcohol use: Never     Frequency: Never    Drug use: Not on file       ALLERGIES  No Known Allergies      MEDICATIONS  Current Medications    famotidine (PEPCID) injection  20 mg Intravenous Daily    insulin lispro  0-12 Units Subcutaneous Q6H    insulin glargine  10 Units Subcutaneous Nightly    hydrocortisone sodium succinate PF  50 mg Intravenous Q6H    levothyroxine  50 mcg Intravenous Daily    And    sodium chloride (PF)  5 mL Intravenous Daily    sodium chloride flush  10 mL Intravenous 2 times per day    sodium chloride flush  10 mL Intravenous 2 times per day    aspirin  81 mg Oral Daily    atorvastatin  40 mg Oral Nightly    levetiracetam  1,000 mg Intravenous Q12H     metoprolol, metoprolol, potassium chloride, sodium chloride flush, acetaminophen **OR** acetaminophen, polyethylene glycol, promethazine **OR** ondansetron, nicotine, LORazepam, diphenhydrAMINE, glucose, dextrose, glucagon (rDNA), dextrose  IV Drips/Infusions   midazolam Stopped (07/31/20 1450)    sodium chloride Stopped (07/30/20 1841)    IV infusion builder 100 mL/hr at 08/01/20 1700    norepinephrine Stopped (07/30/20 0445)    EPINEPHrine infusion Stopped (07/29/20 1910)    dextrose       Home Medications  No current facility-administered medications on file prior to encounter. No current outpatient medications on file prior to encounter. Data         /68   Pulse 72   Temp 97 °F (36.1 °C) (Esophageal)   Resp 25   Ht 5' 10\" (1.778 m)   Wt 170 lb (77.1 kg)   SpO2 100%   BMI 24.39 kg/m²     Wt Readings from Last 3 Encounters:   07/27/20 170 lb (77.1 kg)        Code Status: Full Code     ADVANCED CARE PLANNING:  Patient has capacity for medical decisions: no  Health Care Power of : no  Living Will: no     Personal, Social, and Family History  Marital Status: single  Living situation:with family:  son  Importance of milagros/Religious/spiritual beliefs: [] Very [] Somewhat [] Not   Psychological Distress:   Does patient understand diagnosis/treatment? not asked  Does caregiver understand diagnosis/treatment? yes      Assessment        REVIEW OF SYSTEMS  Unable to assess d/t patient being intubated and not responding. PHYSICAL ASSESSMENT:  Unable to assess; d/t COVID-19 strict isolation and in efforts to conserve PPE in this Global Pandemic. Per RN Fi02 is 30% and peep is 5.  Patient is unable to follow any commands and park snot respond to verbal or painful stimuli. He has no purposeful movements or spontaneous eye opening. Patient is on no sedation. Palliative Performance Scale:  ___60%  Ambulation reduced; Significant disease; Can't do hobbies/housework; intake normal or reduced; occasional assist; LOC full/confusion  ___50%  Mainly sit/lie; Extensive disease; Can't do any work; Considerable assist; intake normal or reduced; LOC full/confusion  ___40%  Mainly in bed; Extensive disease; Mainly assist; intake normal or reduced; LOC full/confusion   ___30%  Bed Bound; Extensive disease; Total care; intake reduced; LOCfull/confusion  ___20%  Bed Bound; Extensive disease; Total care; intake minimal; Drowsy/coma  _x__10%  Bed Bound; Extensive disease; Total care; Mouth care only; Drowsy/coma  ___0       Death      Plan      Palliative Interaction: I received updated from Tan Jaffe Radcliff Josefina and she reports not being aware of how many children patient has but reports that  has arranged a Zoom meeting today with some of them. She is aware that some children have released their decision rights to [place]. It may be best to have these known children involved in the decision making incase we are unable to locate other estranged children. Rosa Garcia informed me that patient has poor neurological outcome from Mx cardiac arrest this stay. She reports Nephrology talking about dialysis being futile because of patients neurological status and ability to have a meaningful recovery. I informed Rosa Garcia that I will attempt to gather information from children in hopes we can get in touch with most of the biological children. I informed her that I will leave note so primary and  can review prior to Zoom meeting later this afternoon and that we will return Monday. I reached out to Yessy Darlingon-[phone] is mother Gavin Roberts, and Jay Ojeda. I introduced myself and the palliative role. She would like to be part of the [address] with  at [facility] today.  zulily is patients biological children. She reports wanting to be involved in "[employer] and gave e-mail of [email or web address]. [employer] Donette Felty reports that her mother Connie Claudio had herself and brother Melo Devries with Mr. Ulysses Burrow. I offered her support and she was appreciative. I reached out to [employer] ([phone]) and introduced myself and the palliative role. He reports that he is letting [place] decide on goals of care for father. I asked him about the possible estranged child from out of state that he knew of many years ago. He reports believing he is an only child and is not sure if he has his dads last name or his cousins Abhishek Alcantar). He reports only knowing first name Faisal Bey)- spelling could be incorrect. He is not aware of his phone number or place of living. I offered support and he was appreciative. I reached out to [facility] ([phone]) and Introduced myself and the palliative role. I informed her that we are a support system and have been asked to help with identifying biological children of patient. Court Seek reports to be only child. She reports wanting to be part of the 389 [address] and e-mail is [email or web address]     I reached out to [facility] ELIGIO [phone]). He reports to be the brother of Elena Forbes and also reports that he is having her make decisions for his father on his part. He reports that he does not want to be on Zoom call today. Other children include Ananth Chung ([phone]) who per notes has want to be involved. I reached out to him and introduced myself and the palliative role. I offered him support and he was appreciative and tearful. He reports to be the closest of all children. He reports that he lives with his father and he cooks for him. He reports wanting to be involved and has many questions in regards to his dads condition. I reviewed patients MRI and latest EEG results with him and he was again tearful.  He reports his e-mail address to be that we will be able to do anything more than update family on Zoom call today and answer questions because we do not have the Next of Kin figured out with this many unknown children being involved. Primary may have to discuss the need for dialysis today if nephrology thinks it is warranted at this time. Education/support to staff  Education/support to family  Communications with primary service  Providing support for coping/adaptation/distress of family  Discussing meaning/purpose   Continue with current plan of care  Clarification of medical condition to patient and family  Code status clarified: Full Code  Validating patient/family distress   has attempted to identify all biological children, but many are unknown d/t being estranged for many years. Patient has multiple previous relationships with Mx children. It is unknown the number of biological children he may have. Ethics was consulted and reports we need to try to identify estranged children.  has placed message with Security for assistance with this yesterday. Principle Problem/Diagnosis:  COVID-19 virus infection    Additional Assessments:   Principal Problem:    COVID-19 virus infection  Active Problems:    Toxic metabolic encephalopathy    Secondary hypothyroidism    History of pituitary tumor    Seizure (HCC)    Somnolence    Shock circulatory (HCC)    Pituitary insufficiency (HCC)    High anion gap metabolic acidosis    New onset atrial fibrillation (HCC)    Pulseless electrical activity (HCC)    LEILA (acute kidney injury) ([facility] [address]    Anoxic encephalopathy (HCC)  Resolved Problems:    * No resolved hospital problems. *    1- Symptom management/ pain control     Pain Assessment:  Not assessed               Anxiety:  Not assessed                          Dyspnea:  Not assessed                          Fatigue:  Not assessed    Other: We feel the patient symptoms are being controlled.  his current regimen is reviewed by myself and discussed with the staff. 2- Goals of care evaluation   The patient goals of care are support for family/caregiver   Goals of care discussed with:    [] Patient independently    [] Patient and Family    [x] Family or Healthcare DPOA independently    [] Unable to discuss with patient, family/DPOA not present    3- Code Status  Full Code    4- Other recommendations   - We will continue to provide comfort and support to the patient and the family  Please call with any palliative questions or concerns. Palliative Care Team is available via perfect serve or via phone. Palliative Care will continue to follow Mr. Donald Calvert care as needed. Thank you for allowing Palliative Care to participate in the care of Mr. Rick Langley . This note has been dictated by dragon, typing errors may be a possibility. The total time I spent in seeing the patient, discussing goals of care, advanced directives, code status and other major issues was more than 80 minutes      Electronically signed by   DARREN Brennan CNP  Palliative Care Team  on 8/2/2020 at 10:59 AM    Palliative Care can be reached via perfect [employer].

## 2020-08-02 NOTE — PROGRESS NOTES
Neurology Nurse Practitioner Progress Note      INTERVAL HISTORY: This is a 60 y.o.  male admitted 7/27/2020 for unresponsiveness. This is a follow-up neurology progress note. The chart was reviewed. Discussed with the RN. LTME -diffuse cortical dysfunction. MRI brain -diffuse hypoxic, ischemic injury; patient stays intubated; off of sedation and pressors; pupils dilated & reactive, corneal reflexes and gag reflex absent, as per RN. CODE STATUS changed to DNR CCA after family meeting on Zoom (8/2). HPI: Christopher Munson is a 60 y.o. male with significant H/O pituitary tumor resection (2016), medication noncompliance, DM, who was admitted 7/27/2020 for unresponsiveness. According to medical record, patient was working at a [address] with no air conditioning and his coworkers found him unresponsive on the ground; EMS were called who found him to be febrile with [address]:  At arrival, he was awake but lethargic with some weakness of right leg and slurring; got evaluated by the stroke team with [facility] 4.  CT head was negative. CTA head and neck was unremarkable. He was loaded with Keppra 1 g IVPB x1 with initial plan to maintain at 500 mg twice a day. EEG showed moderate to severe encephalopathy with near continuous frontally dominant rhythmic delta activity appeared to be on ictal interictal spectrum and was concerning for subclinical seizures; Keppra maintenance dose was increased to 1 g IVPB twice a day after bolus of 500 mg was given. COVID was positive; patient was [facility] to [facility] unit. On 7/29 patient had PEA arrest undergoing 2 rounds of CPR, in and out of A. fib requiring bolus of amiodarone before NSR. Reportedly, he coded 2 more times; got intubated and transferred to ICU where he had another PEA arrest.  During 1 of the codes, his blood sugar was noted to be 11. With code, LTME with diffuse slowing. LTME was D/C'd during that time.   Patient developed copious diarrhea; was started on Levophed for shock.  famotidine (PEPCID) injection  20 mg Intravenous Daily    insulin lispro  0-12 Units Subcutaneous Q6H    insulin glargine  10 Units Subcutaneous Nightly    hydrocortisone sodium succinate PF  50 mg Intravenous Q6H    levothyroxine  50 mcg Intravenous Daily    And    sodium chloride (PF)  5 mL Intravenous Daily    sodium chloride flush  10 mL Intravenous 2 times per day    sodium chloride flush  10 mL Intravenous 2 times per day    aspirin  81 mg Oral Daily    atorvastatin  40 mg Oral Nightly    levetiracetam  1,000 mg Intravenous Q12H       Past Medical History:   Diagnosis Date    Depression     ED (erectile dysfunction)     Hypothyroid     Migraine     Pituitary tumor     Type 2 diabetes mellitus ([facility] [address]     resolved with 50# weight loss       Past Surgical History:   Procedure Laterality Date    PITUITARY EXCISION  10/05/2016       PHYSICAL EXAM:      Blood pressure 139/80, pulse 70, temperature 96.8 °F (36 °C), temperature source Esophageal, resp. rate 22, height 5' 10\" (1.778 m), weight 170 lb (77.1 kg), SpO2 100 %. Due to the current efforts to prevent transmission of COVID-19 and also the need to preserve PPE for other caregivers, a face-to-face encounter with the patient was not performed. That being said, all relevant records and diagnostic tests were reviewed, including laboratory results and imaging. Patient was observed through the window. Please reference any relevant documentation elsewhere. Care was coordinated with the primary service. Per RN, patient stays intubated, off of sedation and pressors.  Stays unresponsive without sedation  Pupils - reactive  Corneal reflexes - absent  Gag/cough reflex - absent  No response at loud sound or painful stimuli  No limb withdrawal        DATA    Lab Results   Component Value Date    WBC 13.5 (H) 08/02/2020    HGB 7.4 (L) 08/02/2020    HCT 22.6 (L) 08/02/2020    PLT See Reflexed IPF Result 08/02/2020

## 2020-08-02 NOTE — FLOWSHEET NOTE
SPIRITUAL CARE DEPARTMENT - [address]  PROGRESS NOTE    Shift date: 08/02/2020   Shift day: Sunday   Shift # 2    Room # 4923/6807-83   Name: Kirk Mendoza            Age: 60 y.o. Gender: male          Alevism:    Place of Druze:     Referral: Family conference with Hospitalist and Neurology to advocate for code change    Admit Date & Time: 7/27/2020  8:06 PM    PATIENT/EVENT DESCRIPTION:  Kirk Mendoza is a 60 y.o. male   Who by  Medical reports had suffered severe brain injury and is declining daily. SPIRITUAL ASSESSMENT/INTERVENTION:  Amol arranged a family meeting for   Joao Potter [phone]    [email or web address]                        PRESENT ([email or web address])  Shannan Montoya  [phone]    [email or web address]              PRESENT  Evelyn Pozo ([phone])    [email or web address]           PRESENT  [facility] [phone]                                                          DEFERRED TO [address] [place] [address] [phone]   [email or web address]   [facility]  [phone]                          DEFERRED TO [address] [facility],First [facility] [phone]                                                          DEFERRED TO [facility]  [phone]   Gerri [email or web address]. [email or web address]                                   PRESENT   Vince Tommy [phone]   [email or web address]    PRESENT     After about 20 minutes of zoom video. FAMILY AGREED AS A GROUP TO DNR-CCA     Family viewed patient via zoom after the meeting call. SPIRITUAL CARE FOLLOW-UP PLAN:  Chaplains will remain available to offer spiritual and emotional support as needed.       Electronically signed by Mathew Zuniga Resident, on 8/2/2020 at [address] [facility]  [phone]                   08/02/20 7349   Encounter Summary   Services provided to: Family;Significant other;Patient not available   Referral/Consult From: Physician;Nurse   Support System Children;Family members   Continue Visiting   (08/02/2020)   Complexity of Encounter High   Length of Encounter 45 minutes;3 hours   Spiritual Assessment Completed Yes   Routine   Type Follow up   Assessment Tearful;Grieving; Anxious; Spiritual struggle; Doubtful   Intervention Active listening;Explored feelings, thoughts, concerns;Explored coping resources;Nurtured hope   Outcome Engaged in conversation;Expressed feelings/needs/concerns;Coping;Less anxious, less agitated   Grief and Life Adjustment   Type End of life;Grief and loss   Assessment Anxious; Angry   Intervention Facilitated family conference

## 2020-08-02 NOTE — PROGRESS NOTES
Progress Note    Patient - Candace Alvarez  Date of Admission -  2020  8:06 PM  Date of Evaluation -  2020  Room and Bed Number -  2375/6305-97   Hospital Day - 6    CHIEF COMPLAINT : covid -19 pneumonia    SUBJECTIVE:     OVERNIGHT EVENTS:         No acute events     AWAKE & FOLLOWING COMMANDS:  [x] No   [] Yes    SECRETIONS Amount:  [x] Small [] Moderate  [] Large  [] None  Color:     [x] White [] Colored  [] Bloody    SEDATION:  RAAS Score:  [] Propofol gtt  [] Versed gtt  [] Ativan gtt   [x] No Sedation    PARALYZED:  [x] No    [] Yes    VASOPRESSORS:  [x] No    [] Yes  [] Levophed [] Dopamine [] Vasopressin  [] Dobutamine [] Phenylephrine [] Epinephrine      OBJECTIVE:     VITAL SIGNS:  /75   Pulse 70   Temp 96.8 °F (36 °C) (Esophageal)   Resp 25   Ht 5' 10\" (1.778 m)   Wt 170 lb (77.1 kg)   SpO2 100%   BMI 24.39 kg/m²   Tmax over 24 hours:  Temp (24hrs), Av.4 °F (36.3 °C), Min:96.8 °F (36 °C), Max:98.8 °F (37.1 °C)      Patient Vitals for the past 8 hrs:   BP Temp Temp src Pulse Resp SpO2   20 1400 122/75 -- -- 70 25 100 %   20 1300 125/68 -- -- 67 24 100 %   20 1200 139/80 96.8 °F (36 °C) Esophageal 70 22 100 %   20 1100 125/71 -- -- 73 27 100 %   20 1000 115/68 -- -- 72 25 100 %   20 0900 121/68 -- -- 75 26 100 %         Intake/Output Summary (Last 24 hours) at 2020 1637  Last data filed at 2020 1200  Gross per 24 hour   Intake 3884 ml   Output 580 ml   Net 3304 ml     Date 20 0000 - 20 2359   Shift 3868-0855 2871-7148 9767-4163 24 Hour Total   INTAKE   I.V.(mL/kg) 4451(71.4) 155(2)  1283(16.6)   Shift Total(mL/kg) 4855(92.1) 155(2)  1283(16.6)   OUTPUT   Urine(mL/kg/hr) 105(0.2) 80(0.1)  185   Stool(mL/kg)  200(2.6)  200(2.6)   Shift Total(mL/kg) 105(1.4) 280(3.6)  385(5)   Weight (kg) 77.1 77.1 77.1 77.1     Wt Readings from Last 3 Encounters:   20 170 lb (77.1 kg)     Body mass index is 24.39 kg/m².         PHYSICAL EXAM:  Unresponsive       MEDICATIONS:  Scheduled Meds:   famotidine (PEPCID) injection  20 mg Intravenous Daily    insulin lispro  0-12 Units Subcutaneous Q6H    insulin glargine  10 Units Subcutaneous Nightly    hydrocortisone sodium succinate PF  50 mg Intravenous Q6H    levothyroxine  50 mcg Intravenous Daily    And    sodium chloride (PF)  5 mL Intravenous Daily    sodium chloride flush  10 mL Intravenous 2 times per day    sodium chloride flush  10 mL Intravenous 2 times per day    aspirin  81 mg Oral Daily    atorvastatin  40 mg Oral Nightly    levetiracetam  1,000 mg Intravenous Q12H     Continuous Infusions:   furosemide (LASIX) 5mg/ml infusion 20 mg/hr (08/02/20 1623)    midazolam Stopped (07/31/20 1450)    sodium chloride Stopped (07/30/20 1841)    IV infusion builder 100 mL/hr at 08/01/20 1700    norepinephrine Stopped (07/30/20 0445)    EPINEPHrine infusion Stopped (07/29/20 1910)    dextrose       PRN Meds:   metoprolol, 5 mg, Q4H PRN  metoprolol, 2.5 mg, Q6H PRN  potassium chloride, 20 mEq, PRN  sodium chloride flush, 10 mL, PRN  acetaminophen, 650 mg, Q6H PRN    Or  acetaminophen, 650 mg, Q6H PRN  polyethylene glycol, 17 g, Daily PRN  promethazine, 12.5 mg, Q6H PRN    Or  ondansetron, 4 mg, Q6H PRN  nicotine, 1 patch, Daily PRN  LORazepam, 2 mg, Q4H PRN  diphenhydrAMINE, 25 mg, Q6H PRN  glucose, 15 g, PRN  dextrose, 12.5 g, PRN  glucagon (rDNA), 1 mg, PRN  dextrose, 100 mL/hr, PRN        SUPPORT DEVICES: [x] Ventilator [] BIPAP  [] Nasal Cannula [] Room Air    VENT SETTINGS (Comprehensive) (if applicable):    Vent Information  $Ventilation: $Subsequent Day  Skin Assessment: Clean, dry, & intact  Suction Catheter Diameter: 14  Equipment ID: [id number]  Vent Type: Servo i  Vent Mode: PRVC  Vt Ordered: 480 mL  Rate Set: 18 bmp  FiO2 : 30 %  SpO2: 100 %  SpO2/FiO2 ratio: 333.33  Sensitivity: 2  Flow by: 2  PEEP/CPAP: 5  I Time/ I Time %: 0.8 s  Humidification Source: [facility]  Nitric Oxide/Epoprostenol In Use?: No  Additional Respiratory  Assessments  Pulse: 70  Resp: 25  SpO2: 100 %  End Tidal CO2: 29 (%)  Position: Semi-[place]'s  Humidification Source: HME  Oral Care Completed?: Yes  Oral Care: Mouth swabbed, Mouth suctioned, Mouthwash  Subglottic Suction Done?: Yes  Cuff Pressure (cm H2O): (mov)  Skin barrier applied: No    ABGs:     Lab Results   Component Value Date    CDT2UTN 21 08/02/2020    FIO2 NOT REPORTED 08/02/2020       DATA:  Complete Blood Count:   Recent Labs     07/31/20  0400 08/01/20  0536 08/02/20  0550   WBC 16.2* 15.9* 13.5*   RBC 4.05* 2.85* 2.63*   HGB 11.1* 8.0* 7.4*   HCT 33.4* 24.9* 22.6*   MCV 82.5* 87.4 85.9   MCH 27.4 28.1 28.1   MCHC 33.2 32.1 32.7   RDW 15.5* 15.7* 15.4*   PLT See Reflexed IPF Result See Reflexed IPF Result See Reflexed IPF Result   MPV NOT REPORTED NOT REPORTED NOT REPORTED        Last 3 Blood Glucose:   Recent Labs     07/31/20  0400 08/01/20  0536 08/02/20  0550   GLUCOSE 156* 94 94        PT/INR:    Lab Results   Component Value Date    PROTIME 10.9 07/28/2020    INR 1.0 07/28/2020     PTT:    Lab Results   Component Value Date    APTT 27.9 08/02/2020       Comprehensive Metabolic Profile:   Recent Labs     07/30/20  1643  07/31/20  0400  08/01/20  0536 08/01/20  0836 08/01/20  1820 08/02/20  0550   NA  --    < > 145*   < > 144 134* 137 138   K  --    < > 4.5   < > 4.6 4.7 4.6 4.9   CL  --    < > 108*   < > 104 98 99 100   CO2  --    < > 21   < > 20 18* 19* 18*   BUN  --   --  41*  --  65*  --   --  99*   CREATININE  --   --  4.68*  --  6.04*  --   --  7.34*   GLUCOSE  --   --  156*  --  94  --   --  94   CALCIUM  --   --  7.4*  --  7.1*  --   --  7.1*   PROT 4.3*  --   --   --   --   --   --   --     < > = values in this interval not displayed.       Magnesium:   Lab Results   Component Value Date    MG 1.4 07/31/2020    MG 1.5 07/30/2020    MG 1.7 07/30/2020     Phosphorus:   Lab Results   Component Value Date    PHOS 2.3 07/30/2020 Ionized Calcium: No results found for: CAION     Urinalysis:   Lab Results   Component Value Date    NITRU NEGATIVE 07/27/2020    COLORU YELLOW 07/27/2020    PHUR 5.0 07/27/2020    WBCUA None 07/27/2020    RBCUA 0 TO 2 07/27/2020    MUCUS NOT REPORTED 07/27/2020    TRICHOMONAS NOT REPORTED 07/27/2020    YEAST NOT REPORTED 07/27/2020    BACTERIA NOT REPORTED 07/27/2020    SPECGRAV 1.052 07/27/2020    LEUKOCYTESUR NEGATIVE 07/27/2020    UROBILINOGEN Normal 07/27/2020    BILIRUBINUR NEGATIVE 07/27/2020    GLUCOSEU NEGATIVE 07/27/2020    KETUA NEGATIVE 07/30/2020    AMORPHOUS NOT REPORTED 07/27/2020       HgBA1c:    Lab Results   Component Value Date    LABA1C 5.3 07/28/2020     TSH:    Lab Results   Component Value Date    TSH <0.01 07/28/2020       Xr Chest (single View Frontal)    Result Date: 7/30/2020  Satisfactory position of the lines and catheters. Cardiomegaly with perihilar edema. Ct Head Wo Contrast    Result Date: 7/27/2020  No acute intracranial abnormality. Findings were communicated electronically to Dr. Remedios Faust through the radiology results communication center. Us Renal Complete    Result Date: 7/30/2020  Increased cortical echogenicity consistent with medical renal disease. No hydronephrosis or nephrolithiasis. Increased fluid in the bowel. Xr Chest Portable    Result Date: 7/29/2020  Right IJ central line appears in satisfactory position without evidence of pneumothorax. Endotracheal tube also appears in satisfactory position. Ct Chest Pulmonary Embolism W Contrast    Result Date: 7/27/2020  No evidence of pulmonary embolism or acute pulmonary abnormality. Cta Head Neck W Contrast    Result Date: 7/27/2020  No large vessel occlusion or hemodynamic stenosis Multilevel degenerate changes cervical spine. Mri Limited Brain    Result Date: 7/31/2020  Evidence of diffusely increased diffusion signal throughout the cortex. Clinical correlation is strongly recommended.   Findings can be seen with hypoxic ischemic injury in the appropriate clinical setting. ASSESSMENT:     Acute hypoxic respiratory failure due to covid -19 pneumonia     Lactic acidosis   PEA cardiac arrest   Anoxic brain injury   Shock - adrenal better   LEILA   Hypokalemia     COVID-19 virus infection    Secondary hypothyroidism    History of pituitary tumor    Seizure (HCC)    Somnolence    Pituitary insufficiency (HCC)    High anion gap metabolic acidosis    New onset atrial fibrillation (HCC)    Pulseless electrical activity (HCC)  Resolved Problems:    * No resolved hospital problems. *       PLAN:     No changes   Await family decision   Cont supportive care   Total critical care time caring for this patient with life threatening, unstable organ failure, including direct patient contact, management of life support systems, review of data including imaging and labs, discussions with other team members and physicians at least 27  Min so far today, excluding procedures.    Electronically signed by Esha Spaulding MD on 8/2/2020 at 4:37 PM

## 2020-08-02 NOTE — PROGRESS NOTES
pituitary insufficiency and was supposed to be on hydrocortisone, desmopressin, levothyroxine, testosterone and diabetic medications. He has lost 50 pounds and had stopped all medications. Patient was found to have  decrease cortisol and TSH levels. Patient was lethargic and confused. He had PEA arrest on 7/29/2020 with successful ROSC after couple rounds of CPR followed by new onset A. fib. Patient was given amiodarone 150 mg dose and converted normal sinus rhythm. He had more episodes of multiple PEA arrest successfully resuscitated with CPR. Patient had circulatory shock and needed intubation, pressor support with Levophed, epinephrine. Patient has severe anion gap metabolic acidosis  treated with bicarb bolus and infusion. Patient was started on IV Solu-Cortef, IV levothyroxine for pituitary insufficiency. Levophed was gradually weaned blood pressure improved. Review of Systems:     Unable to obtain as patient is intubated, unresponsive  Medications:      Allergies:  No Known Allergies    Current Meds:   Scheduled Meds:    famotidine (PEPCID) injection  20 mg Intravenous Daily    insulin lispro  0-12 Units Subcutaneous Q6H    insulin glargine  10 Units Subcutaneous Nightly    hydrocortisone sodium succinate PF  50 mg Intravenous Q6H    levothyroxine  50 mcg Intravenous Daily    And    sodium chloride (PF)  5 mL Intravenous Daily    sodium chloride flush  10 mL Intravenous 2 times per day    sodium chloride flush  10 mL Intravenous 2 times per day    aspirin  81 mg Oral Daily    atorvastatin  40 mg Oral Nightly    levetiracetam  1,000 mg Intravenous Q12H     Continuous Infusions:    midazolam Stopped (07/31/20 1450)    sodium chloride Stopped (07/30/20 1841)    IV infusion builder 100 mL/hr at 08/01/20 1700    norepinephrine Stopped (07/30/20 0445)    EPINEPHrine infusion Stopped (07/29/20 1910)    dextrose       PRN Meds: metoprolol, metoprolol, potassium chloride, sodium chloride flush, acetaminophen **OR** acetaminophen, polyethylene glycol, promethazine **OR** ondansetron, nicotine, LORazepam, diphenhydrAMINE, glucose, dextrose, glucagon (rDNA), dextrose    Data:     Past Medical History:   has a past medical history of Depression, ED (erectile dysfunction), Hypothyroid, Migraine, Pituitary tumor, and Type 2 diabetes mellitus ([address] Social History:   reports that he has never smoked. He has never used smokeless tobacco. He reports that he does not drink alcohol. Family History: History reviewed. No pertinent family history. Vitals:  /68   Pulse 75   Temp 97 °F (36.1 °C) (Esophageal)   Resp 26   Ht 5' 10\" (1.778 m)   Wt 170 lb (77.1 kg)   SpO2 100%   BMI 24.39 kg/m²   Temp (24hrs), Av.6 °F (36.4 °C), Min:97 °F (36.1 °C), Max:98.8 °F (37.1 °C)    Recent Labs     20  2354 20  0006 20  0234 20  0832   POCGLU 78 159* 84 100       I/O (24Hr):     Intake/Output Summary (Last 24 hours) at 2020 0943  Last data filed at 2020 0800  Gross per 24 hour   Intake 3752 ml   Output 535 ml   Net 3217 ml       Labs:  Hematology:  Recent Labs     20  0400 20  0536 20  0550   WBC 16.2* 15.9* 13.5*   RBC 4.05* 2.85* 2.63*   HGB 11.1* 8.0* 7.4*   HCT 33.4* 24.9* 22.6*   MCV 82.5* 87.4 85.9   MCH 27.4 28.1 28.1   MCHC 33.2 32.1 32.7   RDW 15.5* 15.7* 15.4*   PLT See Reflexed IPF Result See Reflexed IPF Result See Reflexed IPF Result   MPV NOT REPORTED NOT REPORTED NOT REPORTED   DDIMER 24.85 7.35 13.02     Chemistry:  Recent Labs     20  0400  20  1520  20  0536 20  0836 20  1820 20  0550   *   < > 146*   < > 144 134* 137 138   K 4.5   < > 4.4   < > 4.6 4.7 4.6 4.9   *   < > 107   < > 104 98 99 100   CO2 21   < > 22   < > 20 18* 19* 18*   GLUCOSE 156*  --   --   --  94  --   --  94   BUN 41*  --   --   --  65*  --   --  99*   CREATININE 4.68*  --   --   --  6.04*  --   --  7.34*   MG  --   -- 1.4*  --   --   --   --   --    ANIONGAP 16   < > 17   < > 20* 18* 19* 20*   LABGLOM 13*  --   --   --  10*  --   --  8*   GFRAA 16*  --   --   --  12*  --   --  9*   CALCIUM 7.4*  --   --   --  7.1*  --   --  7.1*    < > = values in this interval not displayed. Recent Labs     07/30/20  1643  08/01/20  1052 08/01/20  1717 08/01/20  2354 08/02/20  0006 08/02/20  0234 08/02/20  0832   PROT 4.3*  --   --   --   --   --   --   --    POCGLU  --    < > 88 85 78 159* 84 100    < > = values in this interval not displayed. ABG:  Lab Results   Component Value Date    POCPH 7.460 08/02/2020    POCPCO2 28.5 08/02/2020    POCPO2 98.9 08/02/2020    POCHCO3 20.3 08/02/2020    NBEA 3 08/02/2020    PBEA NOT REPORTED 08/02/2020    LLQ6OIU 21 08/02/2020    ADOC3XTU 98 08/02/2020    FIO2 NOT REPORTED 08/02/2020     Lab Results   Component Value Date/Time    SPECIAL RT HAND 10ML 07/30/2020 01:57 AM     Lab Results   Component Value Date/Time    CULTURE NO GROWTH 3 DAYS 07/30/2020 01:57 AM       Radiology:  [facility] Chest (single View Frontal)    Result Date: 7/30/2020  Satisfactory position of the lines and catheters. Cardiomegaly with perihilar edema. Ct Head Wo Contrast    Result Date: 7/27/2020  No acute intracranial abnormality. Findings were communicated electronically to Dr. Ariane Raymond through the radiology results communication center. Us Renal Complete    Result Date: 7/30/2020  Increased cortical echogenicity consistent with medical renal disease. No hydronephrosis or nephrolithiasis. Increased fluid in the bowel. Xr Chest Portable    Result Date: 7/29/2020  Right IJ central line appears in satisfactory position without evidence of pneumothorax. Endotracheal tube also appears in satisfactory position. Ct Chest Pulmonary Embolism W Contrast    Result Date: 7/27/2020  No evidence of pulmonary embolism or acute pulmonary abnormality.      Cta Head Neck W Contrast    Result Date: 7/27/2020  No large vessel occlusion or hemodynamic stenosis Multilevel degenerate changes cervical spine. Physical Examination:        Physical exam was referred to conserve PPE and limit exposure to COVID-19    Assessment:        Hospital Problems           Last Modified POA    * (Principal) COVID-19 virus infection 7/28/2020 Yes    Toxic metabolic encephalopathy 9/05/5669 Yes    Secondary hypothyroidism 7/29/2020 Yes    History of pituitary tumor 7/28/2020 Yes    Seizure ([address] 7/28/2020 Yes    Somnolence 7/28/2020 Yes    Shock circulatory ([address] 7/29/2020 Yes    Pituitary insufficiency ([address] 7/29/2020 Yes    High anion gap metabolic acidosis 2/11/8153 Yes    New onset atrial fibrillation ([address] 7/29/2020 Yes    Pulseless electrical activity ([address] 7/29/2020 Yes    LEILA (acute kidney injury) ([address] 7/31/2020 Yes    Anoxic encephalopathy ([address] 7/31/2020 Yes          Plan:         I reviewed hospital course including labs, images and notes    Unresponsiveness/acute metabolic encephalopathy:due to severe anoxic brain injury  ? I discussed the case with neurology attending Dr. Ariane Major , significant damage, no anticipated meaningful recovery, I reviewed MRI brain and EEG results    PEA cardiac arrest: On 7/29, patient arrested x5 with successful risk every time, etiology is unclear, seizure and hypoxia? Cardiology consulted    Acute hypoxemic respiratory failure: Patient is currently ventilated  , Management per pulmonology.     Circulatory shock: Needed pressors and fluids, currently off pressors    Acute kidney injury: Creatinine continue to worsen, likely ATN from hypovolemia and contrast exposure, creatinine continue to worsen, discussed with nephrology again today  Patient  need dialysis but given his overall condition decision is to hold will be based on his meaningful neurological recovery    Hyponatremia: Fluid switch per nephrology    Anion gap metabolic acidosis: Likely secondary to his acute kidney injury needed bicarb infusion    Pituitary cancer?   Status post resection in 2016    DM2: Continue diabetes home meds, insulin sliding scale, hypoglycemia protocol, will follow    Thrombocytopenia: DVT prophylaxis with EPC only      I did have a family zoom meeting with 4 of the  Patient's children and with  his significant other along with neurology NP,  chaplain Marcella Dias and nurse Franky Rojas, we spent over 20 minutes discussing in detail the case and the clinical prognosis, they for the most part were understanding,  Except for 1 of the siblings Corinne Jamison)  Who was very resistant and did not want to discuss another options other than full code initially but after thorough discussion and being clear that the patient brain condition is irreversible as well as  listening to his other siblings opinions we were all able to reach an agreement not to put the patient under the pain of resuscitation again and we agreed to switch code to DNR CCA for now      paper signed and order added    Nurse [place] afterwards took the camera to the room so that all of them can see their dad and spend sometime with him    Spent 40+ minutes  in coordinating care and discussing the plane with  with staff and specialists          Greta Nunes MD  8/2/2020  9:43 AM

## 2020-08-02 NOTE — PLAN OF CARE
Problem: OXYGENATION/RESPIRATORY FUNCTION  Goal: Patient will maintain patent airway  8/1/2020 2232 by Tressa Duffy RCP  Outcome: Ongoing  8/1/2020 1006 by May Dominguez RCP  Outcome: Ongoing     Problem: OXYGENATION/RESPIRATORY FUNCTION  Goal: Patient will achieve/maintain normal respiratory rate/effort  Description: Respiratory rate and effort will be within normal limits for the patient  8/1/2020 2232 by Tressa Duffy RCP  Outcome: Ongoing  8/1/2020 1006 by May Dominguez RCP  Outcome: Ongoing     Problem: MECHANICAL VENTILATION  Goal: Mobility/activity is maintained at optimum level for patient  8/1/2020 2232 by Tressa Duffy RCP  Outcome: Ongoing  8/1/2020 1006 by May Dominguez RCP  Outcome: Ongoing     Problem: MECHANICAL VENTILATION  Goal: Ability to express needs and understand communication  8/1/2020 2232 by Tressa Duffy RCP  Outcome: Ongoing  8/1/2020 1006 by May Dominguez RCP  Outcome: Ongoing     Problem: MECHANICAL VENTILATION  Goal: Patient will maintain patent airway  8/1/2020 2232 by Tressa Duffy RCP  Outcome: Ongoing  8/1/2020 1006 by May Dominguez RCP  Outcome: Ongoing     Problem: MECHANICAL VENTILATION  Goal: Patient will maintain patent airway  8/1/2020 2232 by Tressa Duffy RCP  Outcome: Ongoing  8/1/2020 1006 by May Dominguez RCP  Outcome: Ongoing     Problem: MECHANICAL VENTILATION  Goal: Oral health is maintained or improved  8/1/2020 2232 by Tressa Duffy RCP  Outcome: Ongoing  8/1/2020 1006 by May Dominguez RCP  Outcome: Ongoing     Problem: MECHANICAL VENTILATION  Goal: ET tube will be managed safely  8/1/2020 2232 by Tressa Duffy RCP  Outcome: Ongoing  8/1/2020 1006 by May Dominguez RCP  Outcome: Ongoing     Problem: SKIN INTEGRITY  Goal: Skin integrity is maintained or improved  Outcome: Ongoing

## 2020-08-02 NOTE — PROGRESS NOTES
Zoom meeting took place at [address] [place] [address] today with writing RN, Wanda High, Dr. Caridad Vieyra and Neurology NP- Jonah Guillen. Members that took place in this meeting were as follows: Ronna Ronquillo, Bladimir Mariee, and Kisha Henderson(Rosemarie & Leticia Goltz mother). All members that took place in this conversation were agreeable to change code status to [facility]. Dr. Caridad Vieyra and Alicia Guillen updated all members in this conversation with labs, vitals, scan results, etc. Family expresses that they would like to try and do a zoom meeting again within the next couple days. All questions and concerns answered at this time.

## 2020-08-02 NOTE — PROCEDURES
LONG-TERM EEG-VIDEO [id number] [address]    Patient: Ruthie Perry  Age: 60 y.o. MRN: [id number]    Referring Physician: No ref. provider found  History: The patient is a 60 y.o. male who presented breakthrough seizure/encephalopathy. This long-term video-EEG monitoring study was performed to determine the nature of the patient's clinical events. The patient is on neuroactive medications.    Ruthie Perry   Current Facility-Administered Medications   Medication Dose Route Frequency Provider Last Rate Last Dose    famotidine (PEPCID) injection 20 mg  20 mg Intravenous Daily Aaron Sy MD   20 mg at 08/02/20 9293    metoprolol (LOPRESSOR) injection 5 mg  5 mg Intravenous Q4H PRN Kaur Rowley MD   5 mg at 07/31/20 1531    insulin lispro (HUMALOG) injection vial 0-12 Units  0-12 Units Subcutaneous Q6H DARREN Gomez - CNP   2 Units at 07/31/20 0600    midazolam (VERSED) 1 mg/mL in D5W infusion  1 mg/hr Intravenous Continuous London Rivera DO   Stopped at 07/31/20 1450    insulin glargine (LANTUS) injection vial 10 Units  10 Units Subcutaneous Nightly Madhavi Harper MD   Stopped at 08/01/20 2027    0.45 % sodium chloride infusion   Intravenous Continuous Madhavi Harper MD   Stopped at 07/30/20 1841    sodium chloride 50 mEq in sterile water infusion   Intravenous Continuous Becky Reyes  mL/hr at 08/01/20 1700      metoprolol (LOPRESSOR) injection 2.5 mg  2.5 mg Intravenous Q6H PRN Aaron Sy MD   2.5 mg at 07/29/20 1158    hydrocortisone sodium succinate PF (SOLU-CORTEF) injection 50 mg  50 mg Intravenous Q6H Aaron Sy MD   50 mg at 08/02/20 0833    norepinephrine (LEVOPHED) 16 mg in sodium chloride 0.9 % 250 mL infusion  2 mcg/min Intravenous Continuous Aaron Sy MD   Stopped at 07/30/20 0445    EPINEPHrine (EPINEPHrine HCL) 5 mg in dextrose 5 % 250 mL infusion  1 mcg/min Intravenous Continuous Maryellen Bean MD   Stopped at 07/29/20 1910    levothyroxine (SYNTHROID) injection 50 mcg  50 mcg Intravenous Daily Re Gonzalez MD   50 mcg at 08/02/20 4435    And    sodium chloride (PF) 0.9 % injection 5 mL  5 mL Intravenous Daily Re Gonzalez MD   5 mL at 08/02/20 6309    sodium chloride flush 0.9 % injection 10 mL  10 mL Intravenous 2 times per day Maryellen Bean MD   10 mL at 08/02/20 0835    potassium chloride 20 mEq/50 mL IVPB (Central Line)  20 mEq Intravenous PRN Maryellen Bean MD   Stopped at 07/30/20 1008    sodium chloride flush 0.9 % injection 10 mL  10 mL Intravenous 2 times per day [facility], APRN - CNP   10 mL at 08/02/20 0836    sodium chloride flush 0.9 % injection 10 mL  10 mL Intravenous PRN [facility], APRN - CNP        acetaminophen (TYLENOL) tablet 650 mg  650 mg Oral Q6H PRN [facility], APRN - CNP   650 mg at 07/31/20 1715    Or    acetaminophen (TYLENOL) suppository 650 mg  650 mg Rectal Q6H PRN [facility], APRN - CNP   650 mg at 07/28/20 2322    polyethylene glycol (GLYCOLAX) packet 17 g  17 g Oral Daily PRN [facility], APRN - CNP        promethazine (PHENERGAN) tablet 12.5 mg  12.5 mg Oral Q6H PRN [facility], APRN - CNP        Or    ondansetron (ZOFRAN) injection 4 mg  4 mg Intravenous Q6H PRN [facility], APRN - CNP        nicotine (NICODERM CQ) 21 MG/24HR 1 patch  1 patch Transdermal Daily PRN [facility], APRN - CNP        LORazepam (ATIVAN) injection 2 mg  2 mg Intravenous Q4H PRN [facility], APRN - CNP        diphenhydrAMINE (BENADRYL) injection 25 mg  25 mg Intravenous Q6H PRN Venus Ruiz, APRN - CNP   25 mg at 07/28/20 2321    aspirin EC tablet 81 mg  81 mg Oral Daily Godfrey Del Angel MD   81 mg at 08/01/20 0910    atorvastatin (LIPITOR) tablet 40 mg  40 mg Oral Nightly Godfrey Del Angel MD   40 mg at 08/01/20 2122    glucose (GLUTOSE) 40 % oral gel 15 g  15 g Oral PRN Lea Blair MD        dextrose 50 % IV solution  12.5 g Intravenous PRN Lea Blair MD   12.5 g at 08/01/20 2359    glucagon (rDNA) injection 1 mg  1 mg Intramuscular PRN Lea Blair MD        dextrose 5 % solution  100 mL/hr Intravenous PRN Lea Blair MD        levetiracetam (KEPPRA) 1000 mg/100 mL IVPB  1,000 mg Intravenous Q12H Pablo Jasso MD   Stopped at 08/02/20 0848     Technical Description: This is a 21-channel digital EEG recording with time-locked video. Electrodes were placed in accordance with the 10-20 International System of Electrode Placement. Single lead EKG monitoring was included. Baseline EEG Recording:  A formal baseline EEG recording was not obtained. Day 1 - 7/31/20, starting at 20:30    Interictal EEG Samples: The EEG was obtained during intubated state. No clear discernible cerebral activity was seen. Intermittent EMG artifact and EKG artifact were seen during the recording. No wakeful pattern was seen. The EEG did not show any stretching or reactivity. The EKG channel revealed no abnormalities. Ictal EEG Recording / Patient Events: During this period the patient had no events or seizures. Summary: During this day of recording no events were recorded. The interictal EEG was severely abnormal due to attenuated background activity which suggested diffuse cortical dysfunction. Monitoring was continued in order to record the patient's typical events. The EKG channel revealed no abnormalities. Day 2 - 8/1/20    Interictal EEG Samples: Interictal EEG was unchanged from yesterday. Ictal EEG Recording / Patient Events: During this period the patient had no events or seizures. Summary: During this day of recording no events were recorded. The interictal EEG was severely abnormal due to attenuated background activity which suggested diffuse cortical dysfunction.  Monitoring was continued in order to record the patient's typical events. The EKG channel revealed no abnormalities. Day 3 - 8/2/20    Interictal EEG Samples: Interictal EEG was unchanged from yesterday. Ictal EEG Recording / Patient Events: During this period the patient had no events or seizures. Summary: During this day of recording no events were recorded. The interictal EEG was severely abnormal due to attenuated background activity which suggested diffuse cortical dysfunction. Monitoring was continued in order to record the patient's typical events. The EKG channel revealed no abnormalities. Day 4 - 8/3/20 reviewed through 7 am    Interictal EEG Samples: Interictal EEG was unchanged from yesterday. Ictal EEG Recording / Patient Events: During this period the patient had no events or seizures. Summary: During this day of recording no events were recorded. The interictal EEG was severely abnormal due to attenuated background, which suggested diffuse cortical dysfunction. Monitoring was continued in order to record the patient's typical events. The EKG channel revealed no abnormalities. Naman Olson MD  Diplomate, American Board of Psychiatry and Neurology  Diplomate, American Board of Clinical Neurophysiology  Diplomate, American Board of Epilepsy     Please note this is a preliminary report and updated daily. The final report will have a summary of behavior and electrographic findings with clinical correlation.

## 2020-08-02 NOTE — PLAN OF CARE
Problem: Airway Clearance - Ineffective  Goal: Achieve or maintain patent airway  8/2/2020 0921 by Greg Goldstein RN  Outcome: Ongoing  8/2/2020 0902 by Deon Rodriguez RCP  Outcome: Ongoing  8/1/2020 2232 by Christian Ruiz RCP  Outcome: Ongoing     Problem: Gas Exchange - Impaired  Goal: Absence of hypoxia  8/2/2020 0921 by Greg Goldstein RN  Outcome: Ongoing  8/2/2020 0902 by Deon Rodriguez RCP  Outcome: Ongoing  8/1/2020 2232 by Christian Ruiz RCP  Outcome: Ongoing  Goal: Promote optimal lung function  8/2/2020 0921 by Greg Goldstein RN  Outcome: Ongoing  8/2/2020 0902 by Deon Rodriguez RCP  Outcome: Ongoing  8/1/2020 2232 by Christian Ruiz RCP  Outcome: Ongoing     Problem: Breathing Pattern - Ineffective  Goal: Ability to achieve and maintain a regular respiratory rate  8/2/2020 0921 by Greg Goldstein RN  Outcome: Ongoing  8/2/2020 0902 by Deon Rodriguez RCP  Outcome: Ongoing  8/1/2020 2232 by Christian Ruiz RCP  Outcome: Ongoing     Problem:  Body Temperature -  Risk of, Imbalanced  Goal: Ability to maintain a body temperature within defined limits  8/2/2020 0921 by Greg Goldstein RN  Outcome: Ongoing  8/1/2020 2232 by Christian Ruiz RCP  Outcome: Ongoing  Goal: Will regain or maintain usual level of consciousness  8/2/2020 0921 by Greg Goldstein RN  Outcome: Ongoing  8/1/2020 2232 by Christian Ruiz RCP  Outcome: Ongoing  Goal: Complications related to the disease process, condition or treatment will be avoided or minimized  8/2/2020 0921 by Greg Goldstein RN  Outcome: Ongoing  8/1/2020 2232 by Christian Ruiz RCP  Outcome: Ongoing     Problem: Isolation Precautions - Risk of Spread of Infection  Goal: Prevent transmission of infection  8/2/2020 0921 by Greg Goldstein RN  Outcome: Ongoing  8/1/2020 2232 by Christian Ruiz RCP  Outcome: Ongoing     Problem: Nutrition Deficits  Goal: Optimize nutrtional status  Outcome: Ongoing     Problem: Risk for Fluid Volume Deficit  Goal: Maintain normal heart rhythm  Outcome: Ongoing  Goal: Maintain absence of muscle cramping  Outcome: Ongoing  Goal: Maintain normal serum potassium, sodium, calcium, phosphorus, and pH  Outcome: Ongoing     Problem: Loneliness or Risk for Loneliness  Goal: Demonstrate positive use of time alone when socialization is not possible  Outcome: Ongoing     Problem: Fatigue  Goal: Verbalize increase energy and improved vitality  Outcome: Ongoing     Problem: Patient Education: Go to Patient Education Activity  Goal: Patient/Family Education  Outcome: Ongoing     Problem: Skin Integrity:  Goal: Will show no infection signs and symptoms  Description: Will show no infection signs and symptoms  Outcome: Ongoing  Goal: Absence of new skin breakdown  Description: Absence of new skin breakdown  Outcome: Ongoing     Problem: Falls - Risk of:  Goal: Will remain free from falls  Description: Will remain free from falls  Outcome: Ongoing  Goal: Absence of physical injury  Description: Absence of physical injury  Outcome: Ongoing     Problem: Confusion - Acute:  Goal: Absence of continued neurological deterioration signs and symptoms  Description: Absence of continued neurological deterioration signs and symptoms  Outcome: Ongoing  Goal: Mental status will be restored to baseline  Description: Mental status will be restored to baseline  Outcome: Ongoing     Problem: Discharge Planning:  Goal: Ability to perform activities of daily living will improve  Description: Ability to perform activities of daily living will improve  Outcome: Ongoing  Goal: Participates in care planning  Description: Participates in care planning  Outcome: Ongoing     Problem: Injury - Risk of, Physical Injury:  Goal: Will remain free from falls  Description: Will remain free from falls  Outcome: Ongoing  Goal: Absence of physical injury  Description: Absence of physical injury  Outcome: Ongoing     Problem: Mood - Altered:  Goal: Mood stable  Description: Mood stable  Outcome: Ongoing  Goal: Absence of abusive behavior  Description: Absence of abusive behavior  Outcome: Ongoing  Goal: Verbalizations of feeling emotionally comfortable while being cared for will increase  Description: Verbalizations of feeling emotionally comfortable while being cared for will increase  Outcome: Ongoing     Problem: Psychomotor Activity - Altered:  Goal: Absence of psychomotor disturbance signs and symptoms  Description: Absence of psychomotor disturbance signs and symptoms  Outcome: Ongoing     Problem: Sensory Perception - Impaired:  Goal: Demonstrations of improved sensory functioning will increase  Description: Demonstrations of improved sensory functioning will increase  Outcome: Ongoing  Goal: Decrease in sensory misperception frequency  Description: Decrease in sensory misperception frequency  Outcome: Ongoing  Goal: Able to refrain from responding to false sensory perceptions  Description: Able to refrain from responding to false sensory perceptions  Outcome: Ongoing  Goal: Demonstrates accurate environmental perceptions  Description: Demonstrates accurate environmental perceptions  Outcome: Ongoing  Goal: Able to distinguish between reality-based and nonreality-based thinking  Description: Able to distinguish between reality-based and nonreality-based thinking  Outcome: Ongoing  Goal: Able to interrupt nonreality-based thinking  Description: Able to interrupt nonreality-based thinking  Outcome: Ongoing     Problem: Sleep Pattern Disturbance:  Goal: Appears well-rested  Description: Appears well-rested  Outcome: Ongoing     Problem: Restraint Use - Nonviolent/Non-Self-Destructive Behavior:  Goal: Absence of restraint indications  Description: Absence of restraint indications  Outcome: Ongoing  Goal: Absence of restraint-related injury  Description: Absence of restraint-related injury  Outcome: Ongoing     Problem: HEMODYNAMIC STATUS  Goal: Patient has stable vital signs and fluid balance  Outcome: Christian Ruiz RCP  Outcome: Ongoing  Goal: Oral health is maintained or improved  8/2/2020 0921 by Greg Goldstein RN  Outcome: Ongoing  8/2/2020 0902 by Deon Rodriguez RCP  Outcome: Ongoing  8/1/2020 2232 by Christian Ruiz RCP  Outcome: Ongoing  Goal: ET tube will be managed safely  8/2/2020 0921 by Greg Goldstein RN  Outcome: Ongoing  8/2/2020 0902 by Deon Rodriguez RCP  Outcome: Ongoing  8/1/2020 2232 by Christian Ruiz RCP  Outcome: Ongoing     Problem: SKIN INTEGRITY  Goal: Skin integrity is maintained or improved  8/2/2020 0921 by Greg Goldstein RN  Outcome: Ongoing  8/2/2020 0902 by Deon Rodriguez RCP  Outcome: Ongoing  8/1/2020 2232 by Christian Ruiz RCP  Outcome: Ongoing     Problem: NUTRITION  Goal: Nutritional status is improving  Outcome: Ongoing

## 2020-08-02 NOTE — PROGRESS NOTES
Nephrology Progress Note      SUBJECTIVE    Patient examined outside room and plan of care discussed with nurse. Prognosis poor. Pt presented with unresponsiveness and tested + for COVID , also underwent CTA Chest which was negative for PE. He was seen by neurology for possible seizures. Pt did have an episode of cardiopulmonary arrest and needed CPR. Has required levophed and is intubated. Not having any purposeful movements. Concern regarding anoxic brain injury. EEG continues as noted above. Urine output remains around 300 mL's recorded out in the last 24 hours +12 L since admission. This is likely from a combination of contrast exposure and hypoperfusion with ischemic injury. Baseline creat is normal   Labs reviewed. Patient continues to have creatinine rise was 6.04 yesterday now 7.34. Normokalemia. BUN is 99. OBJECTIVE      CURRENT TEMPERATURE:  Temp: 97 °F (36.1 °C)  MAXIMUM TEMPERATURE OVER 24HRS:  Temp (24hrs), Av.9 °F (36.6 °C), Min:97 °F (36.1 °C), Max:98.8 °F (37.1 °C)    CURRENT RESPIRATORY RATE:  Resp: 23  CURRENT PULSE:  Pulse: 79  CURRENT BLOOD PRESSURE:  BP: 120/74  24HR BLOOD PRESSURE RANGE:  Systolic (10KKI), UDJ:850 , Min:120 , OAO:622   ; Diastolic (79VSP), HEX:53, Min:70, Max:81    24HR INTAKE/OUTPUT:      Intake/Output Summary (Last 24 hours) at 2020 0813  Last data filed at 2020 0500  Gross per 24 hour   Intake 3729 ml   Output 300 ml   Net 3429 ml     WEIGHT :  No data found.   PHYSICAL EXAM      GENERAL APPEARANCE: Patient viewed from outside the room nothing further due to preservation of PPE in the setting of COVID    CURRENT MEDICATIONS      furosemide (LASIX) injection 40 mg, BID  famotidine (PEPCID) injection 20 mg, Daily  metoprolol (LOPRESSOR) injection 5 mg, Q4H PRN  insulin lispro (HUMALOG) injection vial 0-12 Units, Q6H  midazolam (VERSED) 1 mg/mL in D5W infusion, Continuous  insulin glargine (LANTUS) injection vial 10 Units, Nightly  0.45 % sodium cardiorespiratory arrest   3. Respiratory failure post cardiac arrest on the ventilator   4. COVID-19 positive  5. Possible seizures    PLAN      1. Meeting today scheduled at 1500 hrs to decide the code status  2. Yesterday had a tel conversation with daughter and explained about RRT. I explained that dialysis will be futile if there is irreversible brain damage. She will talk to neurology and siblings and will arrive at a decision. 3. IV Lasix infusion in the meantime  4. Following. 5. Prognosis is poor. Please do not hesitate to call with questions. Electronically signed by Edinson Keller CNP, APRN - CNP on 8/2/2020 at 8:13 AM  Nephrology [address] [place] [address]  Attending Physician Statement  I have discussed the care of Cristhian Lay, including pertinent history and exam findings,  with the CNP. I have reviewed the key elements of all parts of the encounter with the CNP. I agree with the assessment, plan and orders as documented.     Tanisha Araya MD, MRCP Yanely Jimenez, FACP   8/2/2020 2:12 PM    Nephrology [address] [place] [address]

## 2020-08-02 NOTE — FLOWSHEET NOTE
Spoke with son Dionne Orourke about current pt status and update for the evening. He informed the writer that they was another brother that lives with him. He has the same father and mother as Millie Kelly. His name is Paulette Zhang (son)- [phone]. I informed Millie Kelly that there was going to be a family video meeting at 3pm to talk about current status and future plans. Writer also spoke with Ioana Sanz this AM to update on pt status and tell her about he meeting at [address] [place] [address] Staci Tello got information on a daughter Iris Sharma -#[phone]. She was not sure if the number was correct or not.          Electronically signed by Nrey Winslow RN on 8/2/2020 at 7:14 AM

## 2020-08-03 NOTE — PLAN OF CARE
Problem: Airway Clearance - Ineffective  Goal: Achieve or maintain patent airway  Outcome: Ongoing     Problem: Gas Exchange - Impaired  Goal: Absence of hypoxia  Outcome: Ongoing  Goal: Promote optimal lung function  Outcome: Ongoing     Problem: Breathing Pattern - Ineffective  Goal: Ability to achieve and maintain a regular respiratory rate  Outcome: Ongoing     Problem:  Body Temperature -  Risk of, Imbalanced  Goal: Ability to maintain a body temperature within defined limits  Outcome: Ongoing  Goal: Will regain or maintain usual level of consciousness  Outcome: Ongoing  Goal: Complications related to the disease process, condition or treatment will be avoided or minimized  Outcome: Ongoing     Problem: Isolation Precautions - Risk of Spread of Infection  Goal: Prevent transmission of infection  Outcome: Ongoing     Problem: Nutrition Deficits  Goal: Optimize nutrtional status  Outcome: Ongoing     Problem: Risk for Fluid Volume Deficit  Goal: Maintain normal heart rhythm  Outcome: Ongoing  Goal: Maintain absence of muscle cramping  Outcome: Ongoing  Goal: Maintain normal serum potassium, sodium, calcium, phosphorus, and pH  Outcome: Ongoing     Problem: Loneliness or Risk for Loneliness  Goal: Demonstrate positive use of time alone when socialization is not possible  Outcome: Ongoing     Problem: Fatigue  Goal: Verbalize increase energy and improved vitality  Outcome: Ongoing     Problem: Patient Education: Go to Patient Education Activity  Goal: Patient/Family Education  Outcome: Ongoing     Problem: Skin Integrity:  Goal: Will show no infection signs and symptoms  Description: Will show no infection signs and symptoms  Outcome: Ongoing  Goal: Absence of new skin breakdown  Description: Absence of new skin breakdown  Outcome: Ongoing     Problem: Falls - Risk of:  Goal: Will remain free from falls  Description: Will remain free from falls  Outcome: Ongoing  Goal: Absence of physical injury  Description: Absence of physical injury  Outcome: Ongoing     Problem: Confusion - Acute:  Goal: Absence of continued neurological deterioration signs and symptoms  Description: Absence of continued neurological deterioration signs and symptoms  Outcome: Ongoing  Goal: Mental status will be restored to baseline  Description: Mental status will be restored to baseline  Outcome: Ongoing     Problem: Discharge Planning:  Goal: Ability to perform activities of daily living will improve  Description: Ability to perform activities of daily living will improve  Outcome: Ongoing  Goal: Participates in care planning  Description: Participates in care planning  Outcome: Ongoing     Problem: Injury - Risk of, Physical Injury:  Goal: Will remain free from falls  Description: Will remain free from falls  Outcome: Ongoing  Goal: Absence of physical injury  Description: Absence of physical injury  Outcome: Ongoing     Problem: Mood - Altered:  Goal: Mood stable  Description: Mood stable  Outcome: Ongoing  Goal: Absence of abusive behavior  Description: Absence of abusive behavior  Outcome: Ongoing  Goal: Verbalizations of feeling emotionally comfortable while being cared for will increase  Description: Verbalizations of feeling emotionally comfortable while being cared for will increase  Outcome: Ongoing     Problem: Psychomotor Activity - Altered:  Goal: Absence of psychomotor disturbance signs and symptoms  Description: Absence of psychomotor disturbance signs and symptoms  Outcome: Ongoing     Problem: Sensory Perception - Impaired:  Goal: Demonstrations of improved sensory functioning will increase  Description: Demonstrations of improved sensory functioning will increase  Outcome: Ongoing  Goal: Decrease in sensory misperception frequency  Description: Decrease in sensory misperception frequency  Outcome: Ongoing  Goal: Able to refrain from responding to false sensory perceptions  Description: Able to refrain from responding to false

## 2020-08-03 NOTE — PROGRESS NOTES
Jaquelin Cesar 18    Progress Note    8/3/2020    7:52 AM    Name:   Aria Rodriguez  MRN:     [id number]     Teresaide:      [de-identified]   Room:   [address] [place] [address] Day:  7  Admit Date:  7/27/2020  8:06 PM    PCP:   No primary care provider on file. Code Status:  DNR-CCA    Subjective:     C/C:   Chief Complaint   Patient presents with    Altered Mental Status     Interval History Status: worsened  Patient seen and examined at bedside, no acute events overnight. Continue to be unresponsive to any kind of stimuli EEG with no meaningful waves  Continue to be off sedation  Currently his HR is decreasing  Creatinine continue to increase, minimal urine output  Acidotic  On lasix drip   had family meeting yesterday  Patient vitals, labs and all providers notes were reviewed,from overnight shift and morning updates were noted and discussed with the nurse    Brief History:     Aria Rodriguez is 60 y.o. male  Who was admitted to the hospital on 7/27/2020 for treatment of COVID-19 virus infection. Patient was brought to emergency room with altered mental status. He was at work and was found on the ground several hours later. Patient was confused, drowsy and not answering questions. He did not have any focal weakness. Evaluation in ER showed temperature 102. Lab evaluation showed low cortisol level, low TSH. COVID-19 test was positive. CT chest was negative for PE and did not show any Pneumonia. Neurology was consulted for possible TIA Vs syncope. CT head was negative for acute IC abnormality. Patient has history of pituitary adenoma S/P resection in 10/2016 at [facility] by Dr. Sarah Spencer. Patient had failed to follow-up with the clinic. Patient follows with Dr. Wilson Zamarripa MD.  It appears from outpatient note with PCP on 5/13/2020 that patient has not been taking his medications.   He has not been following his endocrinologist.  He has chronic pituitary insufficiency and was supposed to be on hydrocortisone, desmopressin, levothyroxine, testosterone and diabetic medications. He has lost 50 pounds and had stopped all medications. Patient was found to have  decrease cortisol and TSH levels. Patient was lethargic and confused. He had PEA arrest on 7/29/2020 with successful ROSC after couple rounds of CPR followed by new onset A. fib. Patient was given amiodarone 150 mg dose and converted normal sinus rhythm. He had more episodes of multiple PEA arrest successfully resuscitated with CPR. Patient had circulatory shock and needed intubation, pressor support with Levophed, epinephrine. Patient has severe anion gap metabolic acidosis  treated with bicarb bolus and infusion. Patient was started on IV Solu-Cortef, IV levothyroxine for pituitary insufficiency. Levophed was gradually weaned blood pressure improved. Review of Systems:     Unable to obtain as patient is intubated, unresponsive  Medications:      Allergies:  No Known Allergies    Current Meds:   Scheduled Meds:    famotidine (PEPCID) injection  20 mg Intravenous Daily    insulin lispro  0-12 Units Subcutaneous Q6H    insulin glargine  10 Units Subcutaneous Nightly    hydrocortisone sodium succinate PF  50 mg Intravenous Q6H    levothyroxine  50 mcg Intravenous Daily    And    sodium chloride (PF)  5 mL Intravenous Daily    sodium chloride flush  10 mL Intravenous 2 times per day    sodium chloride flush  10 mL Intravenous 2 times per day    aspirin  81 mg Oral Daily    atorvastatin  40 mg Oral Nightly    [Held by provider] levetiracetam  1,000 mg Intravenous Q12H     Continuous Infusions:    furosemide (LASIX) 5mg/ml infusion 40 mg/hr (08/03/20 0600)    midazolam Stopped (07/31/20 1450)    sodium chloride Stopped (07/30/20 1841)    IV infusion builder 100 mL/hr at 08/01/20 1700    norepinephrine Stopped (07/30/20 0445)    EPINEPHrine infusion Stopped (07/29/20 1910)    dextrose PRN Meds: metoprolol, metoprolol, potassium chloride, sodium chloride flush, acetaminophen **OR** acetaminophen, polyethylene glycol, promethazine **OR** ondansetron, nicotine, LORazepam, diphenhydrAMINE, glucose, dextrose, glucagon (rDNA), dextrose    Data:     Past Medical History:   has a past medical history of Depression, ED (erectile dysfunction), Hypothyroid, Migraine, Pituitary tumor, and Type 2 diabetes mellitus ([address] Social History:   reports that he has never smoked. He has never used smokeless tobacco. He reports that he does not drink alcohol. Family History: History reviewed. No pertinent family history. Vitals:  /70   Pulse 66   Temp 97 °F (36.1 °C) (Esophageal)   Resp 17   Ht 5' 10\" (1.778 m)   Wt 170 lb (77.1 kg)   SpO2 100%   BMI 24.39 kg/m²   Temp (24hrs), Av.9 °F (36.1 °C), Min:96.8 °F (36 °C), Max:97 °F (36.1 °C)    Recent Labs     20  0832 20  1152 20  0201 20  0230   POCGLU 100 96 113* 118*       I/O (24Hr):     Intake/Output Summary (Last 24 hours) at 8/3/2020 0752  Last data filed at 8/3/2020 0500  Gross per 24 hour   Intake 408 ml   Output 870 ml   Net -462 ml       Labs:  Hematology:  Recent Labs     20  0536 20  0550 20  0534   WBC 15.9* 13.5* 13.4*   RBC 2.85* 2.63* 2.60*   HGB 8.0* 7.4* 7.2*   HCT 24.9* 22.6* 22.2*   MCV 87.4 85.9 85.4   MCH 28.1 28.1 27.7   MCHC 32.1 32.7 32.4   RDW 15.7* 15.4* 15.1*   PLT See Reflexed IPF Result See Reflexed IPF Result 79*   MPV NOT REPORTED NOT REPORTED 12.6   DDIMER 7.35 13.02 7.55     Chemistry:  Recent Labs     20  1520  20  0536  20  0550 20  1632 20  0210 20  0534   *   < > 144   < > 138 137 135 137   K 4.4   < > 4.6   < > 4.9 5.0 4.8 5.0      < > 104   < > 100 97* 96* 97*   CO2 22   < > 20   < > 18* 17* 16* 16*   GLUCOSE  --   --  94  --  94  --   --  122*   BUN  --   --  65*  --  99*  --   --  136*   CREATININE  --   -- 6.04*  --  7.34*  --   --  9.08*   MG 1.4*  --   --   --   --   --   --   --    ANIONGAP 17   < > 20*   < > 20* 23* 23* 24*   LABGLOM  --   --  10*  --  8*  --   --  6*   GFRAA  --   --  12*  --  9*  --   --  7*   CALCIUM  --   --  7.1*  --  7.1*  --   --  7.0*    < > = values in this interval not displayed. Recent Labs     08/02/20  0006 08/02/20  0234 08/02/20  0832 08/02/20  1152 08/03/20  0201 08/03/20  0230   POCGLU 159* 84 100 96 113* 118*     ABG:  Lab Results   Component Value Date    POCPH 7.442 08/03/2020    POCPCO2 26.7 08/03/2020    POCPO2 106.7 08/03/2020    POCHCO3 18.2 08/03/2020    NBEA 5 08/03/2020    PBEA NOT REPORTED 08/03/2020    NCH4FKE 19 08/03/2020    DSVK7RDC 98 08/03/2020    FIO2 30.0 08/03/2020     Lab Results   Component Value Date/Time    SPECIAL RT HAND 10ML 07/30/2020 01:57 AM     Lab Results   Component Value Date/Time    CULTURE NO GROWTH 4 DAYS 07/30/2020 01:57 AM       Radiology:  [facility] Chest (single View Frontal)    Result Date: 7/30/2020  Satisfactory position of the lines and catheters. Cardiomegaly with perihilar edema. Ct Head Wo Contrast    Result Date: 7/27/2020  No acute intracranial abnormality. Findings were communicated electronically to Dr. Hermelinda Regalado through the radiology results communication center. Us Renal Complete    Result Date: 7/30/2020  Increased cortical echogenicity consistent with medical renal disease. No hydronephrosis or nephrolithiasis. Increased fluid in the bowel. Xr Chest Portable    Result Date: 7/29/2020  Right IJ central line appears in satisfactory position without evidence of pneumothorax. Endotracheal tube also appears in satisfactory position. Ct Chest Pulmonary Embolism W Contrast    Result Date: 7/27/2020  No evidence of pulmonary embolism or acute pulmonary abnormality. Cta Head Neck W Contrast    Result Date: 7/27/2020  No large vessel occlusion or hemodynamic stenosis Multilevel degenerate changes cervical spine. diabetes home meds, insulin sliding scale, hypoglycemia protocol, will follow    Thrombocytopenia: DVT prophylaxis with EPC only    8/3   Discussed with neurology NP [place]  neuro NP, Dr Lyndon Calvert to discuss with patient daughter regarding his input on how poor is his prognosis and the irreversible damage  Appreciate input and efforts  They will DC EEG and Keppra       8/2  I did have a family zoom meeting with 4 of the  Patient's children and with  his significant other along with neurology NP,  chaplain Feliz Nicholas and nurse Aislinn Juarez, we spent over 20 minutes discussing in detail the case and the clinical prognosis, they for the most part were understanding,  Except for 1 of the siblings Edgar Prado)  Who was very resistant and did not want to discuss another options other than full code initially but after thorough discussion and being clear that the patient brain condition is irreversible as well as  listening to his other siblings opinions we were all able to reach an agreement not to put the patient under the pain of resuscitation again and we agreed to switch code to DNR CCA for now      paper signed and order added              Meagan Carmichael MD  8/3/2020  7:52 AM

## 2020-08-03 NOTE — PROGRESS NOTES
SBT progress. 08/03/20 0825   Vent Information   Vent Type Servo i   Vent Mode CPAP   Pressure Support 6 cmH20   FiO2  30 %   PEEP/CPAP 5   Vent Patient Data   Rate Measured 19 br/min   Spontaneous  mL   Minute Volume 12.6 Liters   Spontaneous Breathing Trial (SBT) RT Doc   Contraindications to SBT?  None   RSBI Calculated 30.45   Breath Sounds   Right Upper Lobe Diminished   Right Middle Lobe Diminished   Right Lower Lobe Diminished   Left Upper Lobe Diminished   Left Lower Lobe Diminished   Additional Respiratory  Assessments   End Tidal CO2 23 (%)

## 2020-08-03 NOTE — PROGRESS NOTES
SBT stopped at this time. 08/03/20 1355   Spontaneous Breathing Trial (SBT) RT Doc   Contraindications to SBT? Failed SAT   Pulse 92   RSBI Calculated 110.03   Patient fail SBT? Yes   Reasons for SBT failure? Respiratory rate < 8 or > 35;RSBI > 105   Patient meet extubation criteria? No   Reasons failed extubation criteria Failed SAT and/or SBT   Provider ordered extubation?  No   Breath Sounds   Right Upper Lobe Diminished   Right Middle Lobe Diminished   Right Lower Lobe Diminished   Left Upper Lobe Diminished   Left Lower Lobe Diminished   Additional Respiratory  Assessments   Resp (!) 34   End Tidal CO2 24 (%)

## 2020-08-03 NOTE — PLAN OF CARE
PROVIDE ADEQUATE OXYGENATION WITH ACCEPTABLE SP02/ABG'S    [x]  IDENTIFY APPROPRIATE OXYGEN THERAPY  [x]   MONITOR SP02/ABG'S AS NEEDED   [x]   PATIENT EDUCATION AS NEEDED    MECHANICAL VENTILATION     [x]   PROVIDE OPTIMAL VENTILATION  [x]   ASSESS FOR EXTUBATION READINESS  [x]   ASSESS FOR WEANING READINESS  [x]  EXTUBATE AS TOLERATED  [x]  IMPLEMENT ADULT MECHANICAL VENTILATION PROTOCOL  [x]  MAINTAIN ADEQUATE OXYGENATION  [x]  PERFORM SPONTANEOUS WEANING TRIAL AS TOLERATED    Assessment for weaning readiness    PRE-TRIAL PATIENT ASSESSMENT - COMPLETED AT 0820    Completed by:  Delonte Guerrero  9:25 AM    PARAMETER CRITERIA FOR WEANING CRITERIA FOR WEANING   Diaphoresis, agitation or dyspnea None present No   Heart Rate    Pulse: 69 Pulse less than 50 or greater than 140   No   Blood Pressure  BP: 195/97 Systolic Blood Pressure less than 90 mmHg No   Vaspressors Vasopressors greater than or equal to 5mcg No   SpO2: 100 %  FiO2 : 30 %     SpO2 less than 90% and or   FiO2 is greater than 50%  Peep is greater than 8   No   P/F ratio: 363  :No results found for: PHART, ZPY0SDV, PO2ART, R6PNSJZN, XSI1NJP, BEART   PaO2/FiO2 less than or equal to 150 No   Sedation Patient in unable to follow simple commands on a continuous infusion of Midazolam, Ativan, Diprivan, or other hypnosedatives with the exception of PCA morphine, Fentanyl and Dilaudid with a basal rate No

## 2020-08-03 NOTE — PROGRESS NOTES
NEUROLOGY INPATIENT PROGRESS NOTE    8/3/2020         Current Exam:     Chart reviewed. Discussed with RN. No change in neurologic status. Patient remains intubated, off sedation, no spontaneous movement. He does breathe over the vent and has pupil reactivity but does not have a cough, gag or corneals. Brief History:    Juliocesar Porras is a  60 y.o. male with H/O pituitary tumor resection (2016), DM, who was admitted on 7/27/2020 after being found unresponsive at work; EMS found him febrile at [address]. On arrival he was lethargic with slurred speech and RLE weakness. CT head negative, CTA head and neck unremarkable. He was loaded with IV Keppra 1 gram and continued on maintenance dosing 500mg BID. EEG with moderate to severe encephalopathy with near continuous frontally dominant rhythmic delta activity concerning for subclinical status; Keppra maintenance dose was increased to 1 gram twice daily. He was found to be COVID + and was transferred to [facility] unit. On 7/29 patient had a PEA arrest undergoing 2 rounds of CPR; he reportedly coded two more times, was intubated and transferred to [facility] ICU where he has another PEA arrest. During one code, his blood sugar was found to be 11. Patient was started on LTME with diffuse slowing. No current facility-administered medications on file prior to encounter. No current outpatient medications on file prior to encounter. Allergies: Juliocesar Porras has No Known Allergies. Past Medical History:   Diagnosis Date    Depression     ED (erectile dysfunction)     Hypothyroid     Migraine     Pituitary tumor     Type 2 diabetes mellitus ([facility] [address]     resolved with 50# weight loss       Past Surgical History:   Procedure Laterality Date    PITUITARY EXCISION  10/05/2016       Social History: Juliocesar Porras  reports that he has never smoked. He has never used smokeless tobacco. He reports that he does not drink alcohol. History reviewed.  No pertinent family history. Objective:   /63   Pulse 73   Temp 96.4 °F (35.8 °C) (Esophageal)   Resp 21   Ht 5' 10\" (1.778 m)   Wt 170 lb (77.1 kg)   SpO2 100%   BMI 24.39 kg/m²     Blood pressure range: Systolic (32LLN), THN:832 , Min:118 , AYL:400   ; Diastolic (46OCI), EBZ:00, Min:63, Max:75      Review of Systems:  Cannot perform due to patient condition                                            Due to the current efforts to prevent transmission of COVID-19 and also the need to preserve PPE for other caregivers, a face-to-face encounter with the patient was not performed. That being said, all relevant records and diagnostic tests were reviewed, including laboratory results and imaging. Discussed with RN. Patient remains intubated, off of sedation. LTME continues to be reflective of severe diffuse encephalopathy. RN reports there is absent cough, absent gag, absent corneal reflex. The patient does breathe over the vent and has reactive pupils. No spontaneous movement. Data:    Lab Results:   CBC:   Recent Labs     08/01/20  0536 08/02/20  0550 08/03/20  0534   WBC 15.9* 13.5* 13.4*   HGB 8.0* 7.4* 7.2*   PLT See Reflexed IPF Result See Reflexed IPF Result 79*     BMP:    Recent Labs     08/01/20  0536  08/02/20  0550  08/03/20  0210 08/03/20  0534 08/03/20  0930      < > 138   < > 135 137 139   K 4.6   < > 4.9   < > 4.8 5.0 5.0      < > 100   < > 96* 97* 100   CO2 20   < > 18*   < > 16* 16* 16*   BUN 65*  --  99*  --   --  136*  --    CREATININE 6.04*  --  7.34*  --   --  9.08*  --    GLUCOSE 94  --  94  --   --  122*  --     < > = values in this interval not displayed. Lab Results   Component Value Date    CHOL 150 07/28/2020    LDLCHOLESTEROL 100 07/28/2020    HDL 24 (L) 07/28/2020    TRIG 131 07/28/2020    ALT 27 07/28/2020    AST 44 (H) 07/28/2020    TSH <0.01 (L) 07/28/2020    INR 1.0 07/28/2020    LABA1C 5.3 07/28/2020           Diagnostic data reviewed:  CT HEAD (7/27/2020):  No acute intracranial abnormality      MRI BRAIN (7/31/2020):   Evidence of diffusely increased diffusion signal throughout the cortex;    hypoxic ischemic injury in the appropriate clinical setting.      CTA HEAD & NECK (7/27/2020): Unremarkable        LTME (7/28 - 7/29/2020):  Day 1: During this day of recording no events were recorded. The interictal EEG was abnormal due to diffuse polymorphic delta theta slowing suggesting moderate to severe encephalopathy.  Near continuous frontally dominant rhythmic delta activity appeared to be on ictal-interictal spectrum and was concerning for subclinical seizures     Day 2: During this day of recording no events were recorded. The interictal EEG was abnormal due to diffuse polymorphic delta theta slowing suggesting moderate to severe encephalopathy.  Near continuous frontally dominant rhythmic delta activity appeared to be on ictal-interictal spectrum and was concerning for subclinical seizures. The EEG was essentially unchanged from yesterday       EEG (7/31/2020): This EEG was severely abnormal.  No clear discernible cerebral activity was seen.  This finding is suggestive of diffuse cortical dysfunction or can be from sedation medication effect        REPEAT LTME (7/31-  No events were recorded. The interictal EEG was severely abnormal due to attenuated background activity which suggested diffuse cortical dysfunction. Impression:  -Anoxic encephalopathy s/p cardiac arrest x4  -New onset A fib post arrest  -COVID-19  -LEILA    Plan:  -MRI brain with diffuse hypoxic ischemic injury and LTME with severe diffuse encephalopathy. The patient continues off of sedation and has no spontaneous movement, no cough, no gag. Neurologic prognosis is felt to be very poor with any chance of functional neurologic recovery felt to be quite unlikely.   This was discussed over the phone by attending neurologist and daughter Ioana Sanz, who states that she does not want any changes to CODE STATUS at this time and wishes to continue her father on the ventilator despite current prognosis. All questions were answered. We will discontinue LTME and this was also explained to daughter. The neurology team remains available for any questions should they arise. Please note that this note was generated using a voice recognition dictation software. Although every effort was made to ensure the accuracy of this automated transcription, some errors in transcription may have occurred.

## 2020-08-03 NOTE — PROGRESS NOTES
Comprehensive Nutrition Assessment    Type and Reason for Visit:  Reassess    Nutrition Recommendations/Plan:   -Continue NPO status   -Recommend starting EN as pt has been NPO x 7 days    -Suggest tube feeding of Vital High Protein (low calorie, high protein) @ 65 mL/hr x 24 hrs ~> 1560 kcals, 137 gms protein (w/out propofol running)  -Please obtain actual wt as able  -Will monitor nutrition progression     Nutrition Assessment:  Pt declining from a nutritional standpoint aeb pt has been NPO x 7 days. Pt remains NPO and intubated - propofol not running @ this time. No wt hx in EMR. Recommend starting nutrition support 2/2 moderate malnutrition. Will monitor. Malnutrition Assessment:  Malnutrition Status: Moderate malnutrition    Context:  Acute Illness     Findings of the 6 clinical characteristics of malnutrition:  Energy Intake:  1 - 75% or less of estimated energy requirements for 7 or more days  Weight Loss:  Unable to assess     Body Fat Loss:  Unable to assess     Muscle Mass Loss:  Unable to assess    Fluid Accumulation:  1 - Mild Extremities, Generalized   Strength:  Not Performed    Estimated Daily Nutrient Needs:  Energy (kcal):  0822-1280 kcal/d (20-22 kcal/kg);  Weight Used for Energy Requirements:  Current     Protein (g):  1.2-2.0 gm/kg ~>  gms/d; Weight Used for Protein Requirements:  Current          Nutrition Related Findings:  labs/meds reviewed      Wounds:  None       Current Nutrition Therapies:    Diet NPO Effective Now   Propofol off @ this time     Anthropometric Measures:  · Height: 5' 10\" (177.8 cm)  · Current Body Weight: 170 lb (77.1 kg)(estimated)   · Ideal Body Weight: 166 lbs; % Ideal Body Weight 102.4 %   · BMI: 24.4  · BMI Categories: Normal Weight (BMI 22.0 to 24.9) age over 72       Nutrition Diagnosis:   · Inadequate oral intake related to impaired respiratory funtion as evidenced by NPO or clear liquid status due to medical condition, intubation      Nutrition Interventions:   Food and/or Nutrient Delivery:  (Start nutrition support)Continue NPO  Nutrition Education/Counseling:  Education not indicated   Coordination of Nutrition Care:  Continued Inpatient Monitoring    Goals:  Meet % of estimated nutrtion needs     No progress towards goals     Nutrition Monitoring and Evaluation:   Food/Nutrient Intake Outcomes:  Diet Advancement/Tolerance  Physical Signs/Symptoms Outcomes:  Biochemical Data, Nutrition Focused Physical Findings, Skin, Weight, GI Status     Discharge Planning:     Too soon to determine     Electronically signed by Dash Arellano RD, LD on 8/3/20 at 2:45 PM EDT    Contact: [phone]

## 2020-08-03 NOTE — PROCEDURES
[address] [facility]        CONTINUOUS VIDEO EEG MONITORING           PATIENT: Cristhian Lay  MRN #: [id number]  DATE: 8/3/2020 at 7AM to 8/4/2020 at 11:08AM  REFERRING PHYSICIAN:  Scar Saravia MD     BRIEF HISTORY: Patient is a 60year old with DM, fever, who was admitted for unresponsiveness. It is a continuous LTME since 7/31/2020 at 8:30PM (Dr. Clara Phipps reviewed until [id number] [place] [address] on 8/3/2020). AEDs: Keppra 1000mg BID    EEG DESCRIPTION: 21 EEG electrodes were placed according to International 10/20 System. Single EKG electrode was also placed. Video recording was time-locked with EEG recording. BASELINE: Normal baseline was not obtainable. First one hour recording showed the background was at very low amplitude with infrequent generalized delta slow, lasted 0.5-1 second. No posterior dominant rhythm, no eye opening/closing artifacts. Spontaneous variability was minimal.     Hyperventilation and photic stimulation were not performed. Single lead EKG showed regular, heart rate at 70s per minute. Day 1 - recording started from 8/3/2020 at 7AM   AEDs: Keppra 1000mg BID-> off  Interictal: background suppression pattern with occasional low amplitude delta at [facility], likely sweat artifacts. Ictal: None    Summary: During above recoding period, there was evidence of severe diffuse encephalopathy, consistent with severe anoxic brain injury. No epileptiform discharges or EEG/clinical seizures were noted. Day 2 - 8/4/2020 through 11:08AM  AEDs: Ativan PRN   Interictal: unchanged from previous day. Background suppression pattern with occasional low amplitude delta at [facility], likely sweat artifacts. Ictal: None    Summary: During above recoding period, there was evidence of severe diffuse encephalopathy, consistent with severe anoxic brain injury. If no medication effects, it indicates very poor prognosis.     CLASSIFICATION  Abnormal III (Coma)  1. Background suppression    IMPRESSION  The patient underwent 2 day continuous video EEG monitoring which showed evidence of severe diffuse encephalopathy, no epileptiform discharges or EEG/clinical seizures. Very poor prognosis. Clinical correlation and neurological exam are recommended.        Yue Boss MD, [address] [place] [address], Neurology  Board Certified Epileptologist

## 2020-08-03 NOTE — PROGRESS NOTES
Palliative Care Progress Note    NAME:  Candace Alvarez  MEDICAL RECORD NUMBER:  [id number]  AGE: 60 y.o. GENDER: male  : 1960  TODAY'S DATE:  8/3/2020    Reason for Consult:  goals of care, code status discussion, and family support   History of Present Illness     The patient is a 60 y.o. Non-/non  male who presents with Altered Mental Status      Referred to Palliative Care by  [x] Physician   [] Nursing  [] Family Request   [] Other:       He was admitted to the ICU service for AMS (altered mental status) [R41.82]. His hospital course has been associated with Covid 19, anemia, thrombocytopenia, acute renal failure, diarrhea, atrial fibrillation, acute respiratory failure requiring intubation, cardiac arrest with ROSC, H/o pituitary tumor, metabolic encephalopathy, Possible new onset of seizure, shock, anoxic brain injury, hyponatremia, lactic acidosis, and metabolic acidosis. The patient has a complicated medical history and has been hospitalized since 2020  8:06 PM. Patient remains on ventilator with FIO2 30% and peep of 5. Patient was placed on CPAP for awhile earlier today. Patient nurse Edith Lutz reports patient does not have a cough, gag, or corneal reflex. Patient continues on Keppra as prophylaxis for  seizure activity. Patient BUN (136) and Cr (9.08) continue to rise and nephrology is on the case. MRI of Brain   Evidence of diffusely increased diffusion signal throughout the cortex. Clinical correlation is strongly recommended.  Findings can be seen with    hypoxic ischemic injury in the appropriate clinical setting.           Neurology that the LTME continues to be reflective of severe diffuse encephalopathy. OVERNIGHT EVENTS:  Patient [facility]  Patient on ventilator FIO2 30% and Peep of 5  Patient not on pressors or sedation.      /64   Pulse 92   Temp 96.8 °F (36 °C) (Esophageal)   Resp (!) 34   Ht 5' 10\" (1.778 m)   Wt 170 lb (77.1 kg)   SpO2 100%   BMI [phone]  and I introduced myself and my palliative care role. We attempted  to provide an update on her dad's condition and during the conversation she stated she wanted her dad to be a full code. We explained to change her dad's code from a [facility] to a full code the majority of the 9 children would also have to agree to changing the code status to full code. We called Waylon eJnkins [phone]  patient daughter whom was on Zoom call yesterday and she stated that she wanted her dad to remain a [facility]. Next we called Mane Brewer, SON, [phone] whom was part of the Zoom call yesterday and he stated he wanted his dad to remain a [facility]. Next we called Dionne Rinajesus manuel [phone] whom did not answer the call and we were unable to leave a message. Next we called Heidi Chadwick Son [phone] whom was not on Zoom call but wanted his dad to remain a [facility]. Next we called Heather Araizaluda [phone] and he stated that he wanted whatever Asya Lu (brother) and Jewell Toney (sister) said. We explained to him that due to them choosing different options that he would have to make a decision. He requested a little time to think and make decision. Next we called Asya Lu son, [phone] and he stated that he wanted his dad to remain a [facility]. Next we called Gracie Bianchi Son [phone] and he did not answer and we were unable to leave a voice mail. Next we called Reji Irving SON [phone] and he did not answer and we were unable to leave a voicemail. He later called us back and said he was on Zoom call yesterday but now wishes to change his father's code status to a Full code. We also asked Braydon Cruz if he had any phone numbers for Maria G Helm so we could reach out to him, he stated he did not have any. We also requested he attempt to get a hold of his brother to have him call us back and he agreed he would try.      Multiple attempts to follow up with [facility] care evaluation   The patient goals of care are improve or maintain function/quality of life   Goals of care discussed with:    [] Patient independently    [] Patient and Family    [x] Family or Healthcare DPOA independently    [] Unable to discuss with patient, family/DPOA not present    3- Code Status  DNR-CCA    4- Other recommendations  - We will continue to provide comfort and support to the patient and the family    Please call with any palliative questions or concerns. Palliative Care Team is available via perfect serve or via phone. Palliative Care will continue to follow Mr. Janae Morales care as needed. The note has been dictated by dragon, typing errors may be a possibility     Thank you for allowing Palliative Care to participate in the care of Mr. Kori Dugan .        Electronically signed by   DARREN Hoyos NP  Palliative Care Team  on 8/3/2020 at 3:14 PM

## 2020-08-03 NOTE — PROGRESS NOTES
hour   Intake 425 ml   Output 740 ml   Net -315 ml     WEIGHT :  No data found. PHYSICAL EXAM      GENERAL APPEARANCE: patient is seen and examined in the room. He remains on the ventilator with FiO2 of 30%.   HEENT: oral endotracheal tube in place, moist mucous membranes  Neck: no significant JVD, midline trachea  Pulmonary:  Lung fields are relatively clear bilaterally  CV:  Regular rate and rhythm with positive S1 and S2 and no appreciable rub  Abdomen:  Soft and not distended with positiv bowel sounds  Extremities:  No pitting peripheral edema appreciated    CURRENT MEDICATIONS      furosemide (LASIX) 500 mg in dextrose 5 % 100 mL infusion, Continuous  famotidine (PEPCID) injection 20 mg, Daily  metoprolol (LOPRESSOR) injection 5 mg, Q4H PRN  insulin lispro (HUMALOG) injection vial 0-12 Units, Q6H  midazolam (VERSED) 1 mg/mL in D5W infusion, Continuous  insulin glargine (LANTUS) injection vial 10 Units, Nightly  0.45 % sodium chloride infusion, Continuous  sodium chloride 50 mEq in sterile water infusion, Continuous  metoprolol (LOPRESSOR) injection 2.5 mg, Q6H PRN  hydrocortisone sodium succinate PF (SOLU-CORTEF) injection 50 mg, Q6H  norepinephrine (LEVOPHED) 16 mg in sodium chloride 0.9 % 250 mL infusion, Continuous  EPINEPHrine (EPINEPHrine HCL) 5 mg in dextrose 5 % 250 mL infusion, Continuous  levothyroxine (SYNTHROID) injection 50 mcg, Daily    And  sodium chloride (PF) 0.9 % injection 5 mL, Daily  sodium chloride flush 0.9 % injection 10 mL, 2 times per day  potassium chloride 20 mEq/50 mL IVPB (Central Line), PRN  sodium chloride flush 0.9 % injection 10 mL, 2 times per day  sodium chloride flush 0.9 % injection 10 mL, PRN  acetaminophen (TYLENOL) tablet 650 mg, Q6H PRN    Or  acetaminophen (TYLENOL) suppository 650 mg, Q6H PRN  polyethylene glycol (GLYCOLAX) packet 17 g, Daily PRN  promethazine (PHENERGAN) tablet 12.5 mg, Q6H PRN    Or  ondansetron (ZOFRAN) injection 4 mg, Q6H PRN  nicotine (NICODERM CQ) 21 MG/24HR 1 patch, Daily PRN  LORazepam (ATIVAN) injection 2 mg, Q4H PRN  diphenhydrAMINE (BENADRYL) injection 25 mg, Q6H PRN  aspirin EC tablet 81 mg, Daily  atorvastatin (LIPITOR) tablet 40 mg, Nightly  glucose (GLUTOSE) 40 % oral gel 15 g, PRN  dextrose 50 % IV solution, PRN  glucagon (rDNA) injection 1 mg, PRN  dextrose 5 % solution, PRN  [Held by provider] levetiracetam (KEPPRA) 1000 mg/100 mL IVPB, Q12H      LABS      CBC:   Recent Labs     08/01/20  0536 08/02/20  0550 08/03/20  0534   WBC 15.9* 13.5* 13.4*   RBC 2.85* 2.63* 2.60*   HGB 8.0* 7.4* 7.2*   HCT 24.9* 22.6* 22.2*   MCV 87.4 85.9 85.4   MCH 28.1 28.1 27.7   MCHC 32.1 32.7 32.4   RDW 15.7* 15.4* 15.1*   PLT See Reflexed IPF Result See Reflexed IPF Result 79*   MPV NOT REPORTED NOT REPORTED 12.6      BMP:   Recent Labs     08/01/20  0536  08/02/20  0550  08/03/20  0210 08/03/20  0534 08/03/20  0930      < > 138   < > 135 137 139   K 4.6   < > 4.9   < > 4.8 5.0 5.0      < > 100   < > 96* 97* 100   CO2 20   < > 18*   < > 16* 16* 16*   BUN 65*  --  99*  --   --  136*  --    CREATININE 6.04*  --  7.34*  --   --  9.08*  --    GLUCOSE 94  --  94  --   --  122*  --    CALCIUM 7.1*  --  7.1*  --   --  7.0*  --     < > = values in this interval not displayed. PHOSPHORUS:    No results for input(s): PHOS in the last 72 hours.   MAGNESIUM:   Recent Labs     07/31/20  1520   MG 1.4*     FERRITIN:    Lab Results   Component Value Date    FERRITIN 471 07/28/2020     JOEL:   Lab Results   Component Value Date    JOEL NEGATIVE 07/30/2020       SPEP:   Lab Results   Component Value Date    PROT 4.3 07/30/2020    ALBCAL PENDING 07/30/2020    ALBPCT PENDING 07/30/2020    LABALPH PENDING 07/30/2020    LABALPH PENDING 07/30/2020    A1PCT PENDING 07/30/2020    A2PCT PENDING 07/30/2020    LABBETA PENDING 07/30/2020    BETAPCT PENDING 07/30/2020    GAMGLOB PENDING 07/30/2020    GGPCT PENDING 07/30/2020    PATH PENDING 07/30/2020     C3:   Lab Results Component Value Date    C3 70 (L) 07/30/2020     C4:   Lab Results   Component Value Date    C4 14 07/30/2020       URINE CREATININE:    Lab Results   Component Value Date    LABCREA 24.4 07/30/2020     URINE EOSINOPHILS:   Lab Results   Component Value Date    UREO NONE SEEN 07/30/2020     URINALYSIS:  U/A:   Lab Results   Component Value Date    NITRU NEGATIVE 07/27/2020    COLORU YELLOW 07/27/2020    PHUR 5.0 07/27/2020    WBCUA None 07/27/2020    RBCUA 0 TO 2 07/27/2020    MUCUS NOT REPORTED 07/27/2020    TRICHOMONAS NOT REPORTED 07/27/2020    YEAST NOT REPORTED 07/27/2020    BACTERIA NOT REPORTED 07/27/2020    SPECGRAV 1.052 07/27/2020    LEUKOCYTESUR NEGATIVE 07/27/2020    UROBILINOGEN Normal 07/27/2020    BILIRUBINUR NEGATIVE 07/27/2020    GLUCOSEU NEGATIVE 07/27/2020    KETUA NEGATIVE 07/30/2020    AMORPHOUS NOT REPORTED 07/27/2020     ASSESSMENT      1. Acute Kidney Injury: secondary to ischemic and nephrotoxic acute tubular necrosis   Baseline creatinine is 0.9 mg/dL and is up to 9 mg/dL today. 2.  Status post cardiorespiratory arrest   3. Respiratory failure post cardiac arrest on the ventilator   4. COVID-19 positive  5. Possible seizures  6. Apparent anoxic brain injury  7. CODE STATUS changed yesterday to DNR CC Arrest  8. Metabolic acidosis    PLAN      1. With apparent significant anoxic brain injury and limited chance for recovery and poor prognosis, I do not believe dialysis would be reasonable based on the patient's underlying clinical condition and prognosis. 2. Continue IV Lasix infusion to help maintain urine output  3. Consider palliation  4. Following. Please do not hesitate to call with questions.     Electronically signed by Austin Gaytan MD on 8/3/2020 at 11:06 AM  Nephrology [address] [place] [address]

## 2020-08-03 NOTE — PROGRESS NOTES
[facility]  Progress Note    Shan Holter  MRN: [id number]  Date: 8/3/2020    Reason for F/U :   COVID-19 virus infection    Impression :   1. Cardiopulmonary arrest-multiple status post successful resuscitation  2. Anoxic brain injury  3. Acute hypoxic respiratory failure currently ventilator dependent  4. Shock-resolved  · off pressors   5. New onset seizure  6. COVID-19 virus infection without evidence of pneumonia  7. History of pituitary tumor status post resection 2016  8. Acute kidney injury-worsening  9. Hypothyroidism  10. Diabetes mellitus type 2  11. Diarrhea  12. Anasarca    Recommendations:   · The patient continues off systemic antimicrobial therapy. · Again the COVID-19 infection does not need to be treated. · The family did discuss his plan of care and his CODE STATUS was changed to DNR CC arrest.  · The patient remains unresponsive on the ventilator  · The renal parameters continue to worsen and he has significant volume overload/third spacing  · A decision also need to be made whether he needs hemodialysis    Infection Control Recommendations:   COVID-19 virus infection    Discharge Planning:   Patient will need Midline Catheter Insertion/ PICC line Insertion: No  Patient willneed outpatient wound care: No    MedicalDecision making / Summary of Stay:   Shan Holter is a 60y.o.-year-old male who was initially admitted on 7/27/2020. Pietro Morton is seen and evaluated at bedside and the chart was reviewed. He is a poor historian and I was not able to get any history from him. 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 He was last known to be well at about 2 PM when a work colleague checked in on him but he was subsequently found down unresponsive for an unknown period of time. No witnessed seizures. EMS was called and temperature was 102 them on arrival in the emergency department was 100.7.   The patient also lost control of bowel and bladder and was noted to have tongue breath sounds. Abdominal:      General: Bowel sounds are normal. There is distension. Palpations: Abdomen is soft. Musculoskeletal:      Comments: The patient has bilateral upper and lower extremity edema   Skin:     General: Skin is warm and dry. Neurological:      Comments: The patient is comatose and has flaccid paralysis         Laboratory data:   I have independently reviewed the followinglabs:  CBC with Differential:   Recent Labs     08/02/20  0550 08/03/20  0534   WBC 13.5* 13.4*   HGB 7.4* 7.2*   HCT 22.6* 22.2*   PLT See Reflexed IPF Result 79*     BMP:   Recent Labs     07/31/20  1520  08/02/20  0550  08/03/20  0534 08/03/20  0930   *   < > 138   < > 137 139   K 4.4   < > 4.9   < > 5.0 5.0      < > 100   < > 97* 100   CO2 22   < > 18*   < > 16* 16*   BUN  --    < > 99*  --  136*  --    CREATININE  --    < > 7.34*  --  9.08*  --    MG 1.4*  --   --   --   --   --     < > = values in this interval not displayed. Hepatic Function Panel:   No results for input(s): PROT, LABALBU, BILIDIR, IBILI, BILITOT, ALKPHOS, ALT, AST in the last 72 hours. No results for input(s): VANCOTROUGH in the last 72 hours. No results found for: CRP  No results found for: SEDRATE    No results for input(s): PROCAL in the last 72 hours. D-Dimer, Quant  7.55  mg/L FEU  Final  08/03/2020  5:34 AM  [address]      Fibrinogen  428High    140 - 420 mg/dL  Final  08/03/2020  5:34 AM  [address]          D-Dimer, Quant  7.35  mg/L FEU  Final  08/01/2020  5:36 AM  [address]      Fibrinogen  429High    140 - 420 mg/dL  Final  08/01/2020  5:36 AM  [address]        D-Dimer, Quant  24.85  mg/L FEU  Final  07/31/2020  4:00 AM  [address]      Fibrinogen  415  140 - 420 mg/dL  Final  07/31/2020  4:00 AM  [address]        Lactic Acid, Whole Blood  5. 4High    0.7 - 2.1 mmol/L  Final  07/30/2020  9:40 AM  [facility] [address]      -Dimer, Quant  58.48  mg/L FEU  Final  07/30/2020  4:19 AM  [address]      Fibrinogen  291  140 - 420 mg/dL  Final  07/30/2020  4:19 AM  [address]        Lactic Acid, Whole Blood  10. 3High    0.7 - 2.1 mmol/L  Final  07/29/2020  1:24 PM  [address]        Imaging Studies:   MRI OF THE BRAIN WITHOUT CONTRAST  7/31/2020 7:13 pm  Impression    Evidence of diffusely increased diffusion signal throughout the cortex. Clinical correlation is strongly recommended.  Findings can be seen with    hypoxic ischemic injury in the appropriate clinical setting. ONE XRAY VIEW OF THE CHEST 7/30/2020 6:12 pm  Impression    Satisfactory position of the lines and catheters.         Cardiomegaly with perihilar edema.               RETROPERITONEAL ULTRASOUND OF THE KIDNEYS 7/30/2020   Impression    Increased cortical echogenicity consistent with medical renal disease.  No    hydronephrosis or nephrolithiasis.  Increased fluid in the bowel. Cultures:     Culture, Blood 1 [[id number]]   Collected: 07/30/20 0147    Order Status: Completed  Specimen: Blood  Updated: 08/01/20 0508     Specimen Description  . BLOOD     Special Requests  LT HAND 10ML     Culture  NO GROWTH 2 DAYS    Culture, Blood 2 [[id number]]   Collected: 07/30/20 0157    Order Status: Completed  Specimen: Blood  Updated: 08/01/20 0508     Specimen Description  . BLOOD     Special Requests  RT HAND 10ML     Culture  NO GROWTH 2 DAYS    Culture, Blood 1 [[id number]]   Collected: 07/27/20 2028    Order Status: Completed  Specimen: Blood  Updated: 08/01/20 0508     Specimen Description  . BLOOD     Special Requests  NOT REPORTED     Culture  NO GROWTH 5 DAYS    Culture, Blood 1 [[id number]]   Collected: 07/27/20 2030    Order Status: Completed  Specimen: Blood  Updated: 08/01/20 0508     Specimen Description  . BLOOD     Special Requests  NOT REPORTED     Culture  NO GROWTH 5 DAYS 1,000 mg Intravenous Q12H           [facility]  Priscilla Mcdonald MD  Perfect Serve messaging  OFFICE: [phone]      Electronically signed by Priscilla Mcdonald MD on 8/3/2020 at 11:37 AM  Thank you for allowing us to participate in the care of this patient. Please call with questions. This note iscreated with the assistance of a speech recognition program.  While intending to generate a document that actually reflects the content of the visit, the document can still have some errors including those of syntax andsound a like substitutions which may escape proof reading. In such instances, actual meaning can be extrapolated by contextual diversion.

## 2020-08-03 NOTE — PLAN OF CARE
Problem: Airway Clearance - Ineffective  Goal: Achieve or maintain patent airway  8/3/2020 0918 by Rowdy Queen RN  Outcome: Ongoing  8/3/2020 0622 by Naya Mckeon RN  Outcome: Ongoing     Problem: Gas Exchange - Impaired  Goal: Absence of hypoxia  8/3/2020 0918 by Rowdy Queen RN  Outcome: Ongoing  8/3/2020 0622 by Naya Mckeon RN  Outcome: Ongoing  Goal: Promote optimal lung function  8/3/2020 0918 by Rowdy Queen RN  Outcome: Ongoing  8/3/2020 0622 by Naya Mckeon RN  Outcome: Ongoing     Problem: Breathing Pattern - Ineffective  Goal: Ability to achieve and maintain a regular respiratory rate  8/3/2020 0918 by Rowdy Queen RN  Outcome: Ongoing  8/3/2020 0622 by Naya Mckeon RN  Outcome: Ongoing     Problem:  Body Temperature -  Risk of, Imbalanced  Goal: Ability to maintain a body temperature within defined limits  8/3/2020 0918 by Rowdy Queen RN  Outcome: Ongoing  8/3/2020 0622 by Naya Mckeon RN  Outcome: Ongoing  Goal: Will regain or maintain usual level of consciousness  8/3/2020 0918 by Rowdy Queen RN  Outcome: Ongoing  8/3/2020 0622 by Naya Mckeon RN  Outcome: Ongoing  Goal: Complications related to the disease process, condition or treatment will be avoided or minimized  8/3/2020 0918 by Rowdy Queen RN  Outcome: Ongoing  8/3/2020 0622 by Naya Mckeon RN  Outcome: Ongoing     Problem: Isolation Precautions - Risk of Spread of Infection  Goal: Prevent transmission of infection  8/3/2020 0918 by Rowdy Queen RN  Outcome: Ongoing  8/3/2020 0622 by Naya Mckeon RN  Outcome: Ongoing     Problem: Nutrition Deficits  Goal: Optimize nutrtional status  8/3/2020 0918 by Rowdy Queen RN  Outcome: Ongoing  8/3/2020 0622 by Naya Mckeon RN  Outcome: Ongoing     Problem: Risk for Fluid Volume Deficit  Goal: Maintain normal heart rhythm  8/3/2020 0918 by Rowdy Queen RN  Outcome: Ongoing  8/3/2020 0622 by Naya Mckeon RN  Outcome: Ongoing  Goal: Maintain absence of muscle cramping  8/3/2020 0918 by Camilo Bautista RN  Outcome: Ongoing  8/3/2020 0622 by Donette Felty, RN  Outcome: Ongoing  Goal: Maintain normal serum potassium, sodium, calcium, phosphorus, and pH  8/3/2020 0918 by Camilo Bautista RN  Outcome: Ongoing  8/3/2020 0622 by Donette Felty, RN  Outcome: Ongoing     Problem: Loneliness or Risk for Loneliness  Goal: Demonstrate positive use of time alone when socialization is not possible  8/3/2020 0918 by Camilo Bautista RN  Outcome: Ongoing  8/3/2020 0622 by Donette Felty, RN  Outcome: Ongoing     Problem: Fatigue  Goal: Verbalize increase energy and improved vitality  8/3/2020 0918 by Camilo Bautista RN  Outcome: Ongoing  8/3/2020 0622 by Donette Felty, RN  Outcome: Ongoing     Problem: Patient Education: Go to Patient Education Activity  Goal: Patient/Family Education  8/3/2020 9529 by Camilo Bautista RN  Outcome: Ongoing  8/3/2020 0622 by Donette Felty, RN  Outcome: Ongoing     Problem: Skin Integrity:  Goal: Will show no infection signs and symptoms  Description: Will show no infection signs and symptoms  8/3/2020 0918 by Camilo Bautista RN  Outcome: Ongoing  8/3/2020 0622 by Donette Felty, RN  Outcome: Ongoing  Goal: Absence of new skin breakdown  Description: Absence of new skin breakdown  8/3/2020 0918 by Camilo Bautista RN  Outcome: Ongoing  8/3/2020 0622 by Donette Felty, RN  Outcome: Ongoing     Problem: Falls - Risk of:  Goal: Will remain free from falls  Description: Will remain free from falls  8/3/2020 0918 by Camilo Bautista RN  Outcome: Ongoing  8/3/2020 0622 by Donette Felty, RN  Outcome: Ongoing  Goal: Absence of physical injury  Description: Absence of physical injury  8/3/2020 0918 by Camilo Bautista RN  Outcome: Ongoing  8/3/2020 0622 by Donette Felty, RN  Outcome: Ongoing     Problem: Confusion - Acute:  Goal: Absence of continued neurological deterioration signs and symptoms  Description: Absence of continued neurological deterioration signs and symptoms  8/3/2020 0918 by Camilo Bautista RN  Outcome: Ongoing  8/3/2020 0622 by Bella Fisher RN  Outcome: Ongoing  Goal: Mental status will be restored to baseline  Description: Mental status will be restored to baseline  8/3/2020 0918 by Lenny Chao RN  Outcome: Ongoing  8/3/2020 0622 by Bella Fisher RN  Outcome: Ongoing     Problem: Discharge Planning:  Goal: Ability to perform activities of daily living will improve  Description: Ability to perform activities of daily living will improve  8/3/2020 0918 by Lenny Chao RN  Outcome: Ongoing  8/3/2020 0622 by Bella Fisher RN  Outcome: Ongoing  Goal: Participates in care planning  Description: Participates in care planning  8/3/2020 0918 by Lenny Chao RN  Outcome: Ongoing  8/3/2020 0622 by Bella Fisher RN  Outcome: Ongoing     Problem: Injury - Risk of, Physical Injury:  Goal: Will remain free from falls  Description: Will remain free from falls  8/3/2020 0918 by Lenny Chao RN  Outcome: Ongoing  8/3/2020 0622 by Bella Fisher RN  Outcome: Ongoing  Goal: Absence of physical injury  Description: Absence of physical injury  8/3/2020 0918 by Lenny Chao RN  Outcome: Ongoing  8/3/2020 0622 by Bella Fisher RN  Outcome: Ongoing     Problem: Mood - Altered:  Goal: Mood stable  Description: Mood stable  8/3/2020 0918 by Lenny Chao RN  Outcome: Ongoing  8/3/2020 0622 by Bella Fisher RN  Outcome: Ongoing  Goal: Absence of abusive behavior  Description: Absence of abusive behavior  8/3/2020 0918 by Lenny Chao RN  Outcome: Ongoing  8/3/2020 0622 by Bella Fisher RN  Outcome: Ongoing  Goal: Verbalizations of feeling emotionally comfortable while being cared for will increase  Description: Verbalizations of feeling emotionally comfortable while being cared for will increase  8/3/2020 0918 by Lenny Chao RN  Outcome: Ongoing  8/3/2020 0622 by Bella Fisher RN  Outcome: Ongoing     Problem: Psychomotor Activity - Altered:  Goal: Absence of psychomotor disturbance signs and symptoms  Description: Absence of psychomotor disturbance signs and symptoms  8/3/2020 0918 by Dayton Shepherd RN  Outcome: Ongoing  8/3/2020 0622 by Nery Winslow RN  Outcome: Ongoing     Problem: Sensory Perception - Impaired:  Goal: Demonstrations of improved sensory functioning will increase  Description: Demonstrations of improved sensory functioning will increase  8/3/2020 0918 by Dayton Shepherd RN  Outcome: Ongoing  8/3/2020 0622 by Nery Winslow RN  Outcome: Ongoing  Goal: Decrease in sensory misperception frequency  Description: Decrease in sensory misperception frequency  8/3/2020 0918 by Dayton Shepherd RN  Outcome: Ongoing  8/3/2020 0622 by Nery Winslow RN  Outcome: Ongoing  Goal: Able to refrain from responding to false sensory perceptions  Description: Able to refrain from responding to false sensory perceptions  8/3/2020 0918 by Dayton Shepherd RN  Outcome: Ongoing  8/3/2020 0622 by Nery Winslow RN  Outcome: Ongoing  Goal: Demonstrates accurate environmental perceptions  Description: Demonstrates accurate environmental perceptions  8/3/2020 0918 by Dayton Shepherd RN  Outcome: Ongoing  8/3/2020 0622 by Nery Winslow RN  Outcome: Ongoing  Goal: Able to distinguish between reality-based and nonreality-based thinking  Description: Able to distinguish between reality-based and nonreality-based thinking  8/3/2020 0918 by Dayton Shepherd RN  Outcome: Ongoing  8/3/2020 0622 by Nery Winslow RN  Outcome: Ongoing  Goal: Able to interrupt nonreality-based thinking  Description: Able to interrupt nonreality-based thinking  8/3/2020 0918 by Dayton Shepherd RN  Outcome: Ongoing  8/3/2020 0622 by Nery Winslow RN  Outcome: Ongoing     Problem: Sleep Pattern Disturbance:  Goal: Appears well-rested  Description: Appears well-rested  8/3/2020 0918 by Dayton Shepherd RN  Outcome: Ongoing  8/3/2020 0622 by Nery Winslow RN  Outcome: Ongoing     Problem: HEMODYNAMIC STATUS  Goal: Patient has stable vital signs and fluid balance  8/3/2020 0918 by Dayton Shepherd RN  Outcome: Ongoing  8/3/2020 0622 by Bhavana Ocampo RN  Outcome: Ongoing     Problem: ACTIVITY INTOLERANCE/IMPAIRED MOBILITY  Goal: Mobility/activity is maintained at optimum level for patient  8/2/2020 1951 by Oliver Carrillo RCP  Outcome: Ongoing     Problem: COMMUNICATION IMPAIRMENT  Goal: Ability to express needs and understand communication  8/2/2020 1951 by Oliver Carrillo RCP  Outcome: Ongoing     Problem: OXYGENATION/RESPIRATORY FUNCTION  Goal: Patient will maintain patent airway  8/2/2020 1951 by Oliver Carrillo RCP  Outcome: Ongoing  Goal: Patient will achieve/maintain normal respiratory rate/effort  Description: Respiratory rate and effort will be within normal limits for the patient  8/2/2020 1951 by Oliver Carrillo RCP  Outcome: Ongoing     Problem: MECHANICAL VENTILATION  Goal: Mobility/activity is maintained at optimum level for patient  8/2/2020 1951 by Oliver Carrillo RCP  Outcome: Ongoing  Goal: Ability to express needs and understand communication  8/2/2020 1951 by Oliver Carrillo RCP  Outcome: Ongoing  Goal: Patient will maintain patent airway  8/2/2020 1951 by Oliver Carrillo RCP  Outcome: Ongoing  Goal: Patient will maintain patent airway  8/2/2020 1951 by Oliver Carrillo RCP  Outcome: Ongoing  Goal: Oral health is maintained or improved  8/2/2020 1951 by Oliver Carrillo RCP  Outcome: Ongoing  Goal: ET tube will be managed safely  8/2/2020 1951 by Oliver Carrillo RCP  Outcome: Ongoing     Problem: SKIN INTEGRITY  Goal: Skin integrity is maintained or improved  8/3/2020 0918 by Karen Dunn RN  Outcome: Ongoing  8/3/2020 0622 by Bhavana Ocampo RN  Outcome: Ongoing  8/2/2020 1951 by Oliver Carrillo RCP  Outcome: Ongoing     Problem: NUTRITION  Goal: Nutritional status is improving  8/3/2020 0918 by Karen Dunn RN  Outcome: Ongoing  8/3/2020 0622 by Bhavana Ocampo RN  Outcome: Ongoing

## 2020-08-03 NOTE — PROGRESS NOTES
SBT started at this time. 08/03/20 0820   Spontaneous Breathing Trial (SBT) RT Doc   Contraindications to SBT?  None   Pulse 70   Breath Sounds   Right Upper Lobe Diminished   Right Middle Lobe Diminished   Right Lower Lobe Diminished   Left Upper Lobe Diminished   Left Lower Lobe Diminished   Additional Respiratory  Assessments   Resp 18   End Tidal CO2 23 (%)

## 2020-08-03 NOTE — PLAN OF CARE
Nutrition Problem #1: Inadequate oral intake  Intervention: Food and/or Nutrient Delivery: (Start nutrition support)  Nutritional Goals: Meet % of estimated nutrtion needs

## 2020-08-04 NOTE — PROGRESS NOTES
SBT started at this time. 08/04/20 0850   Spontaneous Breathing Trial (SBT) RT Doc   Contraindications to SBT?  None   Pulse 65   Breath Sounds   Right Upper Lobe Diminished   Right Middle Lobe Diminished   Right Lower Lobe Diminished   Left Upper Lobe Diminished   Left Lower Lobe Diminished   Additional Respiratory  Assessments   Resp 16   End Tidal CO2 23 (%)

## 2020-08-04 NOTE — PLAN OF CARE
PROVIDE ADEQUATE OXYGENATION WITH ACCEPTABLE SP02/ABG'S    [x]  IDENTIFY APPROPRIATE OXYGEN THERAPY  [x]   MONITOR SP02/ABG'S AS NEEDED   [x]   PATIENT EDUCATION AS NEEDED    MECHANICAL VENTILATION     [x]   PROVIDE OPTIMAL VENTILATION  [x]   ASSESS FOR EXTUBATION READINESS  [x]   ASSESS FOR WEANING READINESS  [x]  EXTUBATE AS TOLERATED  [x]  IMPLEMENT ADULT MECHANICAL VENTILATION PROTOCOL  [x]  MAINTAIN ADEQUATE OXYGENATION  [x]  PERFORM SPONTANEOUS WEANING TRIAL AS TOLERATED    Assessment for weaning readiness    PRE-TRIAL PATIENT ASSESSMENT - COMPLETED AT 0850    Completed by:  Ashley Lu  9:54 AM    PARAMETER CRITERIA FOR WEANING CRITERIA FOR WEANING   Diaphoresis, agitation or dyspnea None present No   Heart Rate    Pulse: 78 Pulse less than 50 or greater than 140   No   Blood Pressure  BP: 580/19 Systolic Blood Pressure less than 90 mmHg No   Vaspressors Vasopressors greater than or equal to 5mcg No   SpO2: 100 %  FiO2 : 30 %     SpO2 less than 90% and or   FiO2 is greater than 50%  Peep is greater than 8   No   P/F ratio: 340  :No results found for: PHART, MST6RFI, PO2ART, O8JLWTZQ, FPK3AWM, BEART   PaO2/FiO2 less than or equal to 150 No   Sedation Patient in unable to follow simple commands on a continuous infusion of Midazolam, Ativan, Diprivan, or other hypnosedatives with the exception of PCA morphine, Fentanyl and Dilaudid with a basal rate Yes

## 2020-08-04 NOTE — PLAN OF CARE
Problem: Airway Clearance - Ineffective  Goal: Achieve or maintain patent airway  8/4/2020 0955 by Bella Larose, GAYATHRIP  Outcome: Ongoing  8/4/2020 0526 by Jonathan Ennis RN  Outcome: Ongoing     Problem: Gas Exchange - Impaired  Goal: Absence of hypoxia  8/4/2020 0955 by Bella Larose, RCP  Outcome: Ongoing  8/4/2020 0526 by Jonathan Ennis RN  Outcome: Ongoing  Goal: Promote optimal lung function  8/4/2020 0955 by Bella Larose, RCP  Outcome: Ongoing  8/4/2020 0526 by Jonathan Ennis RN  Outcome: Ongoing     Problem: Breathing Pattern - Ineffective  Goal: Ability to achieve and maintain a regular respiratory rate  8/4/2020 0955 by Bella Larose, GAYATHRIP  Outcome: Ongoing  8/4/2020 0526 by Jonathan Ennis RN  Outcome: Ongoing     Problem: OXYGENATION/RESPIRATORY FUNCTION  Goal: Patient will maintain patent airway  8/4/2020 0955 by GAYATHRI GómezP  Outcome: Ongoing  8/3/2020 2004 by Ilia Montemayor RCP  Outcome: Ongoing  Goal: Patient will achieve/maintain normal respiratory rate/effort  Description: Respiratory rate and effort will be within normal limits for the patient  8/4/2020 0955 by Bella Larose RCP  Outcome: Ongoing  8/3/2020 2004 by Ilia Montemayor RCP  Outcome: Ongoing     Problem: MECHANICAL VENTILATION  Goal: Mobility/activity is maintained at optimum level for patient  8/4/2020 0955 by Bella Larose, GAYATHRIP  Outcome: Ongoing  8/3/2020 2004 by Ilia Montemayor RCP  Outcome: Ongoing  Goal: Ability to express needs and understand communication  8/4/2020 0955 by GAYATHRI GómezP  Outcome: Ongoing  8/3/2020 2004 by Ilia Montemayor RCP  Outcome: Ongoing  Goal: Patient will maintain patent airway  8/4/2020 0955 by GAYATHRI GómezP  Outcome: Ongoing  8/3/2020 2004 by Ilia Montemayor RCP  Outcome: Ongoing  Goal: Patient will maintain patent airway  8/4/2020 0955 by Bella Larose, GAYATHRIP  Outcome: Ongoing  8/3/2020 2004 by Ilia Montemayor RCP  Outcome: Ongoing  Goal: Oral health is maintained or improved  8/4/2020 0955 by Brandyn Swartz RCP  Outcome: Ongoing  8/3/2020 2004 by Aileen Yanez RCP  Outcome: Ongoing  Goal: ET tube will be managed safely  8/4/2020 0955 by Brandyn Swartz RCP  Outcome: Ongoing  8/3/2020 2004 by Aileen Yanez RCP  Outcome: Ongoing     Problem: SKIN INTEGRITY  Goal: Skin integrity is maintained or improved  8/4/2020 0955 by Brandyn Swartz RCP  Outcome: Ongoing  8/4/2020 0526 by Bella Fisher RN  Outcome: Ongoing  8/3/2020 2004 by Aileen Yanez RCP  Outcome: Ongoing

## 2020-08-04 NOTE — PROGRESS NOTES
SBT stopped at this time, placed pt back on previous settings. 08/04/20 0855   Vent Information   Vent Type Servo i   Vent Mode CPAP   Pressure Support 6 cmH20   FiO2  30 %   SpO2 100 %   SpO2/FiO2 ratio 333.33   PEEP/CPAP 5   I Time/ I Time % 0.9 s   Humidification Source HME   Vent Patient Data   Rate Measured 34 br/min   Spontaneous  mL   Minute Volume 11.4 Liters   Spontaneous Breathing Trial (SBT) RT Doc   Contraindications to SBT? Failed SAT; Body temperature < 36°C   Pulse 65   RSBI Calculated 176.17   Patient fail SBT? Yes   Reasons for SBT failure? RSBI > 105;Respiratory rate < 8 or > 35;SpO2 < goal;Heart rate > 140   Patient meet extubation criteria?  No   Additional Respiratory  Assessments   Resp 15

## 2020-08-04 NOTE — PROGRESS NOTES
[facility]  Progress Note    Lex Corrales  MRN: [id number]  Date: 8/4/2020    Reason for F/U :   COVID-19 virus infection    Impression :   1. Cardiopulmonary arrest-multiple status post successful resuscitation  2. Anoxic brain injury  3. Acute hypoxic respiratory failure currently ventilator dependent  4. Shock-resolved  · off pressors   5. New onset seizure  6. COVID-19 virus infection without evidence of pneumonia  7. History of pituitary tumor status post resection 2016  8. Acute kidney injury-worsening  9. Hypothyroidism  10. Diabetes mellitus type 2  11. Diarrhea  12. Anasarca    Recommendations:   · The patient continues off systemic antimicrobial therapy. · The COVID-19 infection does not meet bacteria to require treatment. · The patient remains without any purposeful movements due to the anoxic brain injury. · He continues on Lasix drip and the renal parameters remain poor  · It is my understanding that the family continue to want everything done  · CODE STATUS is DNR CC arrest.    Infection Control Recommendations:   COVID-19 virus infection    Discharge Planning:   Patient will need Midline Catheter Insertion/ PICC line Insertion: No  Patient willneed outpatient wound care: No    MedicalDecision making / Summary of Stay:   Lex Corrales is a 60y.o.-year-old male who was initially admitted on 7/27/2020. Alirezakings Walters is seen and evaluated at bedside and the chart was reviewed. He is a poor historian and I was not able to get any history from him. 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 He was last known to be well at about 2 PM when a work colleague checked in on him but he was subsequently found down unresponsive for an unknown period of time. No witnessed seizures. EMS was called and temperature was 102 them on arrival in the emergency department was 100.7. The patient also lost control of bowel and bladder and was noted to have tongue biting.   COVID-19 virus testing was intubated. Breath sounds: Normal breath sounds. Abdominal:      General: Bowel sounds are normal. There is distension. Palpations: Abdomen is soft. Musculoskeletal:      Comments: The patient has bilateral upper and lower extremity edema   Skin:     General: Skin is warm and dry. Neurological:      Comments: The patient did have some nonpurposeful movements of his head and upper extremity         Laboratory data:   I have independently reviewed the followinglabs:  CBC with Differential:   Recent Labs     08/03/20  0534 08/04/20  0420   WBC 13.4* 15.0*   HGB 7.2* 7.2*   HCT 22.2* 21.8*   PLT 79* 151     BMP:   Recent Labs     08/03/20  0534  08/03/20  1709 08/04/20  0420      < > 140 138   K 5.0   < > 5.1 5.0   CL 97*   < > 100 98   CO2 16*   < > 16* 15*   *  --   --  172*   CREATININE 9.08*  --   --  9.98*    < > = values in this interval not displayed. Hepatic Function Panel:   No results for input(s): PROT, LABALBU, BILIDIR, IBILI, BILITOT, ALKPHOS, ALT, AST in the last 72 hours. No results for input(s): VANCOTROUGH in the last 72 hours. No results found for: CRP  No results found for: SEDRATE    No results for input(s): PROCAL in the last 72 hours. D-Dimer, Quant  7.55  mg/L FEU  Final  08/03/2020  5:34 AM  [address]      Fibrinogen  428High    140 - 420 mg/dL  Final  08/03/2020  5:34 AM  [address]          D-Dimer, Quant  7.35  mg/L FEU  Final  08/01/2020  5:36 AM  [address]      Fibrinogen  429High    140 - 420 mg/dL  Final  08/01/2020  5:36 AM  [address]        D-Dimer, Quant  24.85  mg/L FEU  Final  07/31/2020  4:00 AM  [address]      Fibrinogen  415  140 - 420 mg/dL  Final  07/31/2020  4:00 AM  [address]        Lactic Acid, Whole Blood  5. 4High    0.7 - 2.1 mmol/L  Final  07/30/2020  9:40 AM  [address]      -Dimer, Quant  58.48  mg/L FEU  Final 07/30/2020  4:19 AM  [address]      Fibrinogen  291  140 - 420 mg/dL  Final  07/30/2020  4:19 AM  [address]        Lactic Acid, Whole Blood  10. 3High    0.7 - 2.1 mmol/L  Final  07/29/2020  1:24 PM  [address]        Imaging Studies:   MRI OF THE BRAIN WITHOUT CONTRAST  7/31/2020 7:13 pm  Impression    Evidence of diffusely increased diffusion signal throughout the cortex. Clinical correlation is strongly recommended.  Findings can be seen with    hypoxic ischemic injury in the appropriate clinical setting. ONE XRAY VIEW OF THE CHEST 7/30/2020 6:12 pm  Impression    Satisfactory position of the lines and catheters.         Cardiomegaly with perihilar edema.               RETROPERITONEAL ULTRASOUND OF THE KIDNEYS 7/30/2020   Impression    Increased cortical echogenicity consistent with medical renal disease.  No    hydronephrosis or nephrolithiasis.  Increased fluid in the bowel. Cultures:     Culture, Blood 2 [[id number]]   Collected: 07/30/20 0157    Order Status: Completed  Specimen: Blood  Updated: 08/04/20 6999     Specimen Description  . BLOOD     Special Requests  RT HAND 10ML     Culture  NO GROWTH 5 DAYS    Culture, Blood 1 [[id number]]   Collected: 07/30/20 0147    Order Status: Completed  Specimen: Blood  Updated: 08/04/20 0717     Specimen Description  . BLOOD     Special Requests  LT HAND 10ML     Culture  NO GROWTH 5 DAYS      Culture, Blood 1 [[id number]]   Collected: 07/27/20 2028    Order Status: Completed  Specimen: Blood  Updated: 08/02/20 0459     Specimen Description  . BLOOD     Special Requests  NOT REPORTED     Culture  NO GROWTH 6 DAYS    Culture, Blood 1 [[id number]]   Collected: 07/27/20 2030    Order Status: Completed  Specimen: Blood  Updated: 08/02/20 0459     Specimen Description  . BLOOD     Special Requests  NOT REPORTED     Culture  NO GROWTH 6 DAYS      Gastrointestinal Panel, Molecular [[id number]]   Collected: 07/28/20 1220    Order Status: Completed  Specimen: Stool  Updated: 07/29/20 1542     Specimen Description  . FECES     Campylobacter PCR  NEGATIVE: No Campylobacter spp. (jejuni or coli) DNA Detected     Salmonella PCR  NEGATIVE: No Salmonella spp. DNA Detected     Shigatoxin Gene PCR  NEGATIVE: No Shiga toxin-producing gene(s) Detected     Shigella Sp PCR  NEGATIVE: No Shigella spp. / EIEC DNA Detected     Plesiomonas Shigelloides PCR  NEGATIVE: No Plesionomas shigelloides DNA Detected     Vibrio PCR  NEGATIVE: No Vibrio (V. vulnificus, V, parahaemolyticus and V. cholerae) DNA Detected     E Coli Enterotoxigenic PCR  NEGATIVE: No Enterotoxigenic E. coli (ETEC) Heat-labile and heat-stable (LT/ST) DNA Detected     Yersinia Enterocolitica PCR  NEGATIVE: No Yersinia enterocolitica DNA Detected    C DIFF TOXIN/ANTIGEN [[id number]]   Collected: 07/28/20 1220    Order Status: Completed  Specimen: Stool  Updated: 07/29/20 0850     Specimen Description  . FECES     C DIFF AG + TOXIN  NEGATIVE     Comment:  No C. difficile antigen and Toxin Detected. Culture, Urine [[id number]]   Collected: 07/27/20 2154    Order Status: Completed  Specimen: Urine, clean catch  Updated: 07/28/20 2112     Specimen Description  . CLEAN CATCH URINE     Special Requests  NOT REPORTED     Culture  NO SIGNIFICANT GROWTH        Medications:      aspirin  81 mg Oral Daily    famotidine  20 mg Oral Daily    insulin lispro  0-12 Units Subcutaneous Q6H    insulin glargine  10 Units Subcutaneous Nightly    hydrocortisone sodium succinate PF  50 mg Intravenous Q6H    levothyroxine  50 mcg Intravenous Daily    And    sodium chloride (PF)  5 mL Intravenous Daily    sodium chloride flush  10 mL Intravenous 2 times per day    sodium chloride flush  10 mL Intravenous 2 times per day    atorvastatin  40 mg Oral Nightly           [facility]  Lucy Sanders MD  [email or web address]  OFFICE: [phone]      Electronically signed by Zuleima Hare MD on 8/4/2020 at 9:14 AM  Thank you for allowing us to participate in the care of this patient. Please call with questions. This note iscreated with the assistance of a speech recognition program.  While intending to generate a document that actually reflects the content of the visit, the document can still have some errors including those of syntax andsound a like substitutions which may escape proof reading. In such instances, actual meaning can be extrapolated by contextual diversion.

## 2020-08-04 NOTE — PROGRESS NOTES
Jaquelin Cesar 18    Progress Note    8/4/2020    11:32 AM    Name:   Luba Paredes  MRN:     [id number]     [place]:      [de-identified]   [facility] [id number] [place] [address] Day:  8  Admit Date:  7/27/2020  8:06 PM    PCP:   No primary care provider on file. Code Status:  DNR-CCA    Subjective:     C/C:   Chief Complaint   Patient presents with    Altered Mental Status     Interval History Status: unchanged    Patient seen and examined this morning. Remains clinically unchanged. He is nonresponsive. Appears to have some withdrawal to noxious stimuli in all 4 of his extremities. Intermittently will turn his head to the left. However, none of this seems purposeful. Currently on LTME. HR imptoved today. BPs WNL. UOP recorded at ~ 600 cc over past 24 hours. On Lasix infusion. Brief History:     Luba Paredes is 60 y.o. male  Who was admitted to the hospital on 7/27/2020 for treatment of COVID-19 virus infection. Patient was brought to emergency room with altered mental status. He was at work and was found on the ground several hours later. Patient was confused, drowsy and not answering questions. He did not have any focal weakness. Evaluation in ER showed temperature 102. Lab evaluation showed low cortisol level, low TSH. COVID-19 test was positive. CT chest was negative for PE and did not show any Pneumonia. Neurology was consulted for possible TIA Vs syncope. CT head was negative for acute IC abnormality. Patient has history of pituitary adenoma S/P resection in 10/2016 at [facility] by Dr. Marilyn Sanchez. Patient had failed to follow-up with the clinic. Patient follows with Dr. Luis Hurley MD.  It appears from outpatient note with PCP on 5/13/2020 that patient has not been taking his medications.   He has not been following his endocrinologist.  He has chronic pituitary insufficiency and was supposed to be on hydrocortisone, desmopressin, levothyroxine, testosterone and diabetic medications. He has lost 50 pounds and had stopped all medications. Patient was found to have  decrease cortisol and TSH levels. Patient was lethargic and confused. He had PEA arrest on 7/29/2020 with successful ROSC after couple rounds of CPR followed by new onset A. fib. Patient was given amiodarone 150 mg dose and converted normal sinus rhythm. He had more episodes of multiple PEA arrest successfully resuscitated with CPR. Patient had circulatory shock and needed intubation, pressor support with Levophed, epinephrine. Patient has severe anion gap metabolic acidosis  treated with bicarb bolus and infusion. Patient was started on IV Solu-Cortef, IV levothyroxine for pituitary insufficiency. Levophed was gradually weaned blood pressure improved. Review of Systems:     Unable to obtain as patient is intubated and unresponsive off of all sedation. Medications:      Allergies:  No Known Allergies    Current Meds:   Scheduled Meds:    aspirin  81 mg Oral Daily    famotidine  20 mg Oral Daily    insulin lispro  0-12 Units Subcutaneous Q6H    insulin glargine  10 Units Subcutaneous Nightly    hydrocortisone sodium succinate PF  50 mg Intravenous Q6H    levothyroxine  50 mcg Intravenous Daily    And    sodium chloride (PF)  5 mL Intravenous Daily    sodium chloride flush  10 mL Intravenous 2 times per day    sodium chloride flush  10 mL Intravenous 2 times per day    atorvastatin  40 mg Oral Nightly     Continuous Infusions:    furosemide (LASIX) 5mg/ml infusion 40 mg/hr (08/04/20 0508)    sodium chloride Stopped (07/30/20 1841)    IV infusion builder 100 mL/hr at 08/01/20 1700    dextrose       PRN Meds: metoprolol, metoprolol, potassium chloride, sodium chloride flush, acetaminophen **OR** acetaminophen, polyethylene glycol, promethazine **OR** ondansetron, nicotine, LORazepam, diphenhydrAMINE, glucose, dextrose, glucagon (rDNA), dextrose    Data: Past Medical History:   has a past medical history of Depression, ED (erectile dysfunction), Hypothyroid, Migraine, Pituitary tumor, and Type 2 diabetes mellitus ([address] Social History:   reports that he has never smoked. He has never used smokeless tobacco. He reports that he does not drink alcohol. Family History: History reviewed. No pertinent family history. Vitals:  /66   Pulse 78   Temp 98.8 °F (37.1 °C) (Esophageal)   Resp 16   Ht 5' 10\" (1.778 m)   Wt 170 lb (77.1 kg)   SpO2 100%   BMI 24.39 kg/m²   Temp (24hrs), Av.8 °F (36.6 °C), Min:96.8 °F (36 °C), Max:98.8 °F (37.1 °C)    Recent Labs     20  0230 20  1219 20  0325 20  0355   POCGLU 118* 133* 153* 145*       I/O (24Hr): Intake/Output Summary (Last 24 hours) at 2020 1132  Last data filed at 2020 0800  Gross per 24 hour   Intake 310.59 ml   Output 465 ml   Net -154.41 ml       Labs:  Hematology:  Recent Labs     20  0550 20  0534 20  0420   WBC 13.5* 13.4* 15.0*   RBC 2.63* 2.60* 2.55*   HGB 7.4* 7.2* 7.2*   HCT 22.6* 22.2* 21.8*   MCV 85.9 85.4 85.5   MCH 28.1 27.7 28.2   MCHC 32.7 32.4 33.0   RDW 15.4* 15.1* 14.8*   PLT See Reflexed IPF Result 79* 151   MPV NOT REPORTED 12.6 11.6   DDIMER 13.02 7.55 4.06     Chemistry:  Recent Labs     20  0550  20  0534 20  0930 20  1709 20  0420      < > 137 139 140 138   K 4.9   < > 5.0 5.0 5.1 5.0      < > 97* 100 100 98   CO2 18*   < > 16* 16* 16* 15*   GLUCOSE 94  --  122*  --   --  150*   BUN 99*  --  136*  --   --  172*   CREATININE 7.34*  --  9.08*  --   --  9.98*   ANIONGAP 20*   < > 24* 23* 24* 25*   LABGLOM 8*  --  6*  --   --  5*   GFRAA 9*  --  7*  --   --  6*   CALCIUM 7.1*  --  7.0*  --   --  6.7*    < > = values in this interval not displayed.      Recent Labs     20  1152 20  0201 20  0230 20  1219 20  0325 20  0355   POCGLU 96 113* 118* 133* 153* 145*     ABG:  Lab Results   Component Value Date    POCPH 7.411 08/04/2020    POCPCO2 27.5 08/04/2020    POCPO2 101.5 08/04/2020    POCHCO3 17.5 08/04/2020    NBEA 6 08/04/2020    PBEA NOT REPORTED 08/04/2020    KNA5AOD 18 08/04/2020    CKAN2KBB 98 08/04/2020    FIO2 30.0 08/04/2020     Lab Results   Component Value Date/Time    SPECIAL RT HAND 10ML 07/30/2020 01:57 AM     Lab Results   Component Value Date/Time    CULTURE NO GROWTH 5 DAYS 07/30/2020 01:57 AM       Radiology:  Ros[facility] Sanjeev Chest (single View Frontal)    Result Date: 7/30/2020  Satisfactory position of the lines and catheters. Cardiomegaly with perihilar edema. Ct Head Wo Contrast    Result Date: 7/27/2020  No acute intracranial abnormality. Findings were communicated electronically to Dr. Marcelo Rogers through the radiology results communication center. Us Renal Complete    Result Date: 7/30/2020  Increased cortical echogenicity consistent with medical renal disease. No hydronephrosis or nephrolithiasis. Increased fluid in the bowel. Xr Chest Portable    Result Date: 7/29/2020  Right IJ central line appears in satisfactory position without evidence of pneumothorax. Endotracheal tube also appears in satisfactory position. Ct Chest Pulmonary Embolism W Contrast    Result Date: 7/27/2020  No evidence of pulmonary embolism or acute pulmonary abnormality. Cta Head Neck W Contrast    Result Date: 7/27/2020  No large vessel occlusion or hemodynamic stenosis Multilevel degenerate changes cervical spine.        Physical Examination:        General: Intubated, off of all sedation, nonresponsive  HENT: ET tube in place, bleeding noted from patient's lips with dried blood  Cardiac: Regular rate and rhythm, no murmur  Pulmonary: mechanical heart sounds, no wheezing  GI: bowel sounds diminished, protuberant abdomen  : dixon present  Extremities/skin: no rashes appreciated, right brachial art line  Neuro: Appears to have minimal withdraw to noxious Continue diabetes home meds, insulin sliding scale, hypoglycemia protocol, will follow    Wean cortef today - 25 mg q8h; d/c on 8/6    Start tube feeds    Stop fibrinogen draws daily    Thrombocytopenia: DVT prophylaxis with EPC. Hold off on chemical anticoagulation in light of renal failure and potential risk of hyperkalemia, especially if we are attempting to avoid HD. Maintain DNR. Will discuss with daughter tomorrow. Appreciate palliative care reaching out to daughter today.     33 Edwina Davenport DO  8/4/2020  11:32 AM

## 2020-08-04 NOTE — PLAN OF CARE
Problem: Airway Clearance - Ineffective  Goal: Achieve or maintain patent airway  8/4/2020 1950 by Jesenia Crowder RN  Outcome: Ongoing  8/4/2020 0955 by Delonte Guerrero RCP  Outcome: Ongoing     Problem: Gas Exchange - Impaired  Goal: Absence of hypoxia  8/4/2020 1950 by Jesenia Crowder RN  Outcome: Ongoing  8/4/2020 0955 by Delonte Guerrero RCP  Outcome: Ongoing  Goal: Promote optimal lung function  8/4/2020 1950 by Jesenia Crowder RN  Outcome: Ongoing  8/4/2020 0955 by Delonte Guerrero RCP  Outcome: Ongoing     Problem: Breathing Pattern - Ineffective  Goal: Ability to achieve and maintain a regular respiratory rate  8/4/2020 1950 by Jesenia Crowder RN  Outcome: Ongoing  8/4/2020 0955 by Delonte Guerrero RCP  Outcome: Ongoing     Problem:  Body Temperature -  Risk of, Imbalanced  Goal: Ability to maintain a body temperature within defined limits  Outcome: Ongoing  Goal: Will regain or maintain usual level of consciousness  Outcome: Ongoing  Goal: Complications related to the disease process, condition or treatment will be avoided or minimized  Outcome: Ongoing     Problem: Isolation Precautions - Risk of Spread of Infection  Goal: Prevent transmission of infection  Outcome: Ongoing     Problem: Nutrition Deficits  Goal: Optimize nutrtional status  Outcome: Ongoing     Problem: Risk for Fluid Volume Deficit  Goal: Maintain normal heart rhythm  Outcome: Ongoing  Goal: Maintain absence of muscle cramping  Outcome: Ongoing  Goal: Maintain normal serum potassium, sodium, calcium, phosphorus, and pH  Outcome: Ongoing     Problem: Loneliness or Risk for Loneliness  Goal: Demonstrate positive use of time alone when socialization is not possible  Outcome: Ongoing     Problem: Fatigue  Goal: Verbalize increase energy and improved vitality  Outcome: Ongoing     Problem: Patient Education: Go to Patient Education Activity  Goal: Patient/Family Education  Outcome: Ongoing     Problem: Skin Integrity:  Goal: Will show no infection signs and symptoms  Description: Will show no infection signs and symptoms  Outcome: Ongoing  Goal: Absence of new skin breakdown  Description: Absence of new skin breakdown  Outcome: Ongoing     Problem: Falls - Risk of:  Goal: Will remain free from falls  Description: Will remain free from falls  Outcome: Ongoing  Goal: Absence of physical injury  Description: Absence of physical injury  Outcome: Ongoing     Problem: Confusion - Acute:  Goal: Absence of continued neurological deterioration signs and symptoms  Description: Absence of continued neurological deterioration signs and symptoms  Outcome: Ongoing  Goal: Mental status will be restored to baseline  Description: Mental status will be restored to baseline  Outcome: Ongoing     Problem: Discharge Planning:  Goal: Ability to perform activities of daily living will improve  Description: Ability to perform activities of daily living will improve  Outcome: Ongoing  Goal: Participates in care planning  Description: Participates in care planning  Outcome: Ongoing     Problem: Injury - Risk of, Physical Injury:  Goal: Will remain free from falls  Description: Will remain free from falls  Outcome: Ongoing  Goal: Absence of physical injury  Description: Absence of physical injury  Outcome: Ongoing     Problem: Mood - Altered:  Goal: Mood stable  Description: Mood stable  Outcome: Ongoing  Goal: Absence of abusive behavior  Description: Absence of abusive behavior  Outcome: Ongoing  Goal: Verbalizations of feeling emotionally comfortable while being cared for will increase  Description: Verbalizations of feeling emotionally comfortable while being cared for will increase  Outcome: Ongoing     Problem: Psychomotor Activity - Altered:  Goal: Absence of psychomotor disturbance signs and symptoms  Description: Absence of psychomotor disturbance signs and symptoms  Outcome: Ongoing     Problem: Sensory Perception - Impaired:  Goal: Demonstrations of improved sensory functioning will increase  Description: Demonstrations of improved sensory functioning will increase  Outcome: Ongoing  Goal: Decrease in sensory misperception frequency  Description: Decrease in sensory misperception frequency  Outcome: Ongoing  Goal: Able to refrain from responding to false sensory perceptions  Description: Able to refrain from responding to false sensory perceptions  Outcome: Ongoing  Goal: Demonstrates accurate environmental perceptions  Description: Demonstrates accurate environmental perceptions  Outcome: Ongoing  Goal: Able to distinguish between reality-based and nonreality-based thinking  Description: Able to distinguish between reality-based and nonreality-based thinking  Outcome: Ongoing  Goal: Able to interrupt nonreality-based thinking  Description: Able to interrupt nonreality-based thinking  Outcome: Ongoing     Problem: Sleep Pattern Disturbance:  Goal: Appears well-rested  Description: Appears well-rested  Outcome: Ongoing     Problem: HEMODYNAMIC STATUS  Goal: Patient has stable vital signs and fluid balance  Outcome: Ongoing     Problem: ACTIVITY INTOLERANCE/IMPAIRED MOBILITY  Goal: Mobility/activity is maintained at optimum level for patient  8/4/2020 1950 by Evert Zelaya RN  Outcome: Ongoing  8/4/2020 0955 by Alissa Mcleod RCP  Outcome: Ongoing     Problem: COMMUNICATION IMPAIRMENT  Goal: Ability to express needs and understand communication  8/4/2020 1950 by Evert Zelaya RN  Outcome: Ongoing  8/4/2020 0955 by Alissa Mcleod RCP  Outcome: Ongoing     Problem: OXYGENATION/RESPIRATORY FUNCTION  Goal: Patient will maintain patent airway  8/4/2020 1950 by Evret Zelaya RN  Outcome: Ongoing  8/4/2020 0955 by Alissa Mcleod RCP  Outcome: Ongoing  Goal: Patient will achieve/maintain normal respiratory rate/effort  Description: Respiratory rate and effort will be within normal limits for the patient  8/4/2020 1950 by Evert Zelaya RN  Outcome:

## 2020-08-04 NOTE — PROGRESS NOTES
Patient currently breathing over the vent but mental status not hopeful for meaningful recovery. It is ethically indicated to recommend a treatment plan that includes medically indicated treatments given his current condition and prognosis.   If family requests interventions that are not included in that plan we can offer them to find another facility willing to provide the services they request.

## 2020-08-04 NOTE — PROGRESS NOTES
Nephrology Progress Note      SUBJECTIVE    Patient seen and examined in the room. IV Lasix drip continuing at 40 ml/hr with patient with absent corneals and absent gag reflex and absent  Cough reflex and absence of purposeful withdrawal.  Has positive pupillary light reflex and does breathe over the ventilator. BUN and creatinine woorsening and acidosis worsening. Prognosis appears grave with no meaningful chance of recovery. plan of care discussed with nurse. The patient appears to have acute kidney injury following the administration of radio contrast for CT angiogram, in addition to ischemic ATN in the setting of cardiac arrest.  Take 408 mL and output 870 ml, 370 mL of which is urine.   Was seen and examined. Patient was intubated and sedated. Notes from neurology, internal medicine and critical care was reviewed. Patient has dense anoxic encephalopathy. Renal function continues to deteriorate  Fair urine output  Family is still not decided for withdrawal of support at least now his CODE STATUS is DNR CCA  Based on overall neurological status he is not a candidate for dialysis  Patient was not on any pressors  OBJECTIVE      CURRENT TEMPERATURE:  Temp: 98.8 °F (37.1 °C)  MAXIMUM TEMPERATURE OVER 24HRS:  Temp (24hrs), Av.8 °F (37.1 °C), Min:98.8 °F (37.1 °C), Max:98.8 °F (37.1 °C)    CURRENT RESPIRATORY RATE:  Resp: 15  CURRENT PULSE:  Pulse: 62  CURRENT BLOOD PRESSURE:  BP: 131/69  24HR BLOOD PRESSURE RANGE:  Systolic (43XHP), LYL:043 , Min:116 , ZUE:917   ; Diastolic (86PLC), UXD:22, Min:60, Max:84    24HR INTAKE/OUTPUT:      Intake/Output Summary (Last 24 hours) at 2020 1434  Last data filed at 2020 1100  Gross per 24 hour   Intake 183.59 ml   Output 515 ml   Net -331.41 ml     WEIGHT :  No data found. PHYSICAL EXAM      GENERAL APPEARANCE: Patient was seen and examined.   He was not showing any response to physical stimuli  HEENT: Endotracheal tube is in place  Neck: no significant JVD, midline trachea  Pulmonary: Bilateral air entry and clear to auscultation  CV:  Regular rate and rhythm with positive S1 and S2 and no appreciable rub  Abdomen:  Soft and not distended with positiv bowel sounds  Extremities: No edema    CURRENT MEDICATIONS      lubrifresh P.M. (artificial tears) ophthalmic ointment, PRN  aspirin chewable tablet 81 mg, Daily  famotidine (PEPCID) tablet 20 mg, Daily  furosemide (LASIX) 500 mg in dextrose 5 % 100 mL infusion, Continuous  metoprolol (LOPRESSOR) injection 5 mg, Q4H PRN  insulin lispro (HUMALOG) injection vial 0-12 Units, Q6H  insulin glargine (LANTUS) injection vial 10 Units, Nightly  0.45 % sodium chloride infusion, Continuous  sodium chloride 50 mEq in sterile water infusion, Continuous  metoprolol (LOPRESSOR) injection 2.5 mg, Q6H PRN  hydrocortisone sodium succinate PF (SOLU-CORTEF) injection 50 mg, Q6H  levothyroxine (SYNTHROID) injection 50 mcg, Daily    And  sodium chloride (PF) 0.9 % injection 5 mL, Daily  sodium chloride flush 0.9 % injection 10 mL, 2 times per day  potassium chloride 20 mEq/50 mL IVPB (Central Line), PRN  sodium chloride flush 0.9 % injection 10 mL, 2 times per day  sodium chloride flush 0.9 % injection 10 mL, PRN  acetaminophen (TYLENOL) tablet 650 mg, Q6H PRN    Or  acetaminophen (TYLENOL) suppository 650 mg, Q6H PRN  polyethylene glycol (GLYCOLAX) packet 17 g, Daily PRN  promethazine (PHENERGAN) tablet 12.5 mg, Q6H PRN    Or  ondansetron (ZOFRAN) injection 4 mg, Q6H PRN  nicotine (NICODERM CQ) 21 MG/24HR 1 patch, Daily PRN  LORazepam (ATIVAN) injection 2 mg, Q4H PRN  diphenhydrAMINE (BENADRYL) injection 25 mg, Q6H PRN  atorvastatin (LIPITOR) tablet 40 mg, Nightly  glucose (GLUTOSE) 40 % oral gel 15 g, PRN  dextrose 50 % IV solution, PRN  glucagon (rDNA) injection 1 mg, PRN  dextrose 5 % solution, PRN      LABS      CBC:   Recent Labs     08/02/20  0550 08/03/20  0534 08/04/20  0420   WBC 13.5* 13.4* 15.0*   RBC 2.63* 2.60* UROBILINOGEN Normal 07/27/2020    BILIRUBINUR NEGATIVE 07/27/2020    GLUCOSEU NEGATIVE 07/27/2020    KETUA NEGATIVE 07/30/2020    AMORPHOUS NOT REPORTED 07/27/2020     ASSESSMENT      1. Acute kidney injury secondary to ischemic ATN. Patient has markedly elevated BUN and creatinine. Based on neurological status and severe anoxic encephalopathy patient is not a good candidate for dialysis. 2.  Status post cardiorespiratory arrest   3. Respiratory failure post cardiac arrest on the ventilator, patient is on 30% FiO2  4. COVID-19 positive  5. Possible seizures  6. Apparent anoxic brain injury  7. CODE STATUS changed yesterday to DNR CC Arrest  8. Metabolic acidosis    PLAN      1. Continue Lasix infusion  2.. BMP in a.m.  3.  Family meeting so we can discuss the treatment option from renal standpoint       Please do not hesitate to call with questions.     Electronically signed by Laura Banks MD on 8/4/2020 at 2:34 PM  Nephrology [address] [place] [address]

## 2020-08-04 NOTE — PLAN OF CARE
Problem: Airway Clearance - Ineffective  Goal: Achieve or maintain patent airway  Outcome: Ongoing     Problem: Gas Exchange - Impaired  Goal: Absence of hypoxia  Outcome: Ongoing  Goal: Promote optimal lung function  Outcome: Ongoing     Problem: Breathing Pattern - Ineffective  Goal: Ability to achieve and maintain a regular respiratory rate  Outcome: Ongoing     Problem:  Body Temperature -  Risk of, Imbalanced  Goal: Ability to maintain a body temperature within defined limits  Outcome: Ongoing  Goal: Will regain or maintain usual level of consciousness  Outcome: Ongoing  Goal: Complications related to the disease process, condition or treatment will be avoided or minimized  Outcome: Ongoing     Problem: Isolation Precautions - Risk of Spread of Infection  Goal: Prevent transmission of infection  Outcome: Ongoing     Problem: Nutrition Deficits  Goal: Optimize nutrtional status  Outcome: Ongoing     Problem: Risk for Fluid Volume Deficit  Goal: Maintain normal heart rhythm  Outcome: Ongoing  Goal: Maintain absence of muscle cramping  Outcome: Ongoing  Goal: Maintain normal serum potassium, sodium, calcium, phosphorus, and pH  Outcome: Ongoing     Problem: Loneliness or Risk for Loneliness  Goal: Demonstrate positive use of time alone when socialization is not possible  Outcome: Ongoing     Problem: Fatigue  Goal: Verbalize increase energy and improved vitality  Outcome: Ongoing     Problem: Patient Education: Go to Patient Education Activity  Goal: Patient/Family Education  Outcome: Ongoing     Problem: Skin Integrity:  Goal: Will show no infection signs and symptoms  Description: Will show no infection signs and symptoms  Outcome: Ongoing  Goal: Absence of new skin breakdown  Description: Absence of new skin breakdown  Outcome: Ongoing     Problem: Falls - Risk of:  Goal: Will remain free from falls  Description: Will remain free from falls  Outcome: Ongoing  Goal: Absence of physical injury  Description: Absence of physical injury  Outcome: Ongoing     Problem: Confusion - Acute:  Goal: Absence of continued neurological deterioration signs and symptoms  Description: Absence of continued neurological deterioration signs and symptoms  Outcome: Ongoing  Goal: Mental status will be restored to baseline  Description: Mental status will be restored to baseline  Outcome: Ongoing     Problem: Discharge Planning:  Goal: Ability to perform activities of daily living will improve  Description: Ability to perform activities of daily living will improve  Outcome: Ongoing  Goal: Participates in care planning  Description: Participates in care planning  Outcome: Ongoing     Problem: Injury - Risk of, Physical Injury:  Goal: Will remain free from falls  Description: Will remain free from falls  Outcome: Ongoing  Goal: Absence of physical injury  Description: Absence of physical injury  Outcome: Ongoing     Problem: Mood - Altered:  Goal: Mood stable  Description: Mood stable  Outcome: Ongoing  Goal: Absence of abusive behavior  Description: Absence of abusive behavior  Outcome: Ongoing  Goal: Verbalizations of feeling emotionally comfortable while being cared for will increase  Description: Verbalizations of feeling emotionally comfortable while being cared for will increase  Outcome: Ongoing     Problem: Psychomotor Activity - Altered:  Goal: Absence of psychomotor disturbance signs and symptoms  Description: Absence of psychomotor disturbance signs and symptoms  Outcome: Ongoing     Problem: Sensory Perception - Impaired:  Goal: Demonstrations of improved sensory functioning will increase  Description: Demonstrations of improved sensory functioning will increase  Outcome: Ongoing  Goal: Decrease in sensory misperception frequency  Description: Decrease in sensory misperception frequency  Outcome: Ongoing  Goal: Able to refrain from responding to false sensory perceptions  Description: Able to refrain from responding to false sensory perceptions  Outcome: Ongoing  Goal: Demonstrates accurate environmental perceptions  Description: Demonstrates accurate environmental perceptions  Outcome: Ongoing  Goal: Able to distinguish between reality-based and nonreality-based thinking  Description: Able to distinguish between reality-based and nonreality-based thinking  Outcome: Ongoing  Goal: Able to interrupt nonreality-based thinking  Description: Able to interrupt nonreality-based thinking  Outcome: Ongoing     Problem: Sleep Pattern Disturbance:  Goal: Appears well-rested  Description: Appears well-rested  Outcome: Ongoing     Problem: HEMODYNAMIC STATUS  Goal: Patient has stable vital signs and fluid balance  Outcome: Ongoing     Problem: SKIN INTEGRITY  Goal: Skin integrity is maintained or improved  8/4/2020 0526 by lPacido Keita RN  Outcome: Ongoing     Problem: NUTRITION  Goal: Nutritional status is improving  Outcome: Ongoing

## 2020-08-04 NOTE — PROGRESS NOTES
Progress Note    Patient - Luba Paredes  Date of Admission -  7/27/2020  8:06 PM  Date of Evaluation -  8/4/2020  Room and Bed Number -  2986/3425-68   Hospital Day - 8    CHIEF COMPLAINT : covid -19 pneumonia    SUBJECTIVE:     OVERNIGHT EVENTS:         Color change. Remains unresponsive. Consistent withdrawal to noxious stimuli. Breathes above the ventilator. EEG shows background suppression pattern with occasional low amplitude delta. Consistent with severe anoxic injury. AWAKE & FOLLOWING COMMANDS:  [x] No   [] Yes    SECRETIONS Amount:  [x] Small [] Moderate  [] Large  [] None  Color:     [x] White [] Colored  [] Bloody    SEDATION:  RAAS Score:  [] Propofol gtt  [] Versed gtt  [] Ativan gtt   [x] No Sedation    PARALYZED:  [x] No    [] Yes    VASOPRESSORS:  [x] No    [] Yes  [] Levophed [] Dopamine [] Vasopressin  [] Dobutamine [] Phenylephrine [] Epinephrine      OBJECTIVE:     VITAL SIGNS:  /69   Pulse 64   Temp 98.8 °F (37.1 °C) (Esophageal)   Resp 14   Ht 5' 10\" (1.778 m)   Wt 170 lb (77.1 kg)   SpO2 100%   BMI 24.39 kg/m²   Tmax over 24 hours:  No data recorded.       Patient Vitals for the past 8 hrs:   BP Pulse Resp SpO2   08/04/20 1700 131/69 64 14 100 %   08/04/20 1605 -- 66 14 100 %   08/04/20 1600 129/73 66 14 100 %   08/04/20 1500 129/71 60 13 100 %   08/04/20 1400 125/71 63 15 100 %   08/04/20 1300 129/73 64 16 100 %   08/04/20 1200 131/69 62 15 100 %   08/04/20 1100 130/72 63 15 100 %   08/04/20 1000 132/70 70 15 100 %         Intake/Output Summary (Last 24 hours) at 8/4/2020 1722  Last data filed at 8/4/2020 1100  Gross per 24 hour   Intake 141.59 ml   Output 415 ml   Net -273.41 ml     Date 08/04/20 0000 - 08/04/20 2359   Shift 3014-4127 2690-3178 5009-6824 24 Hour Total   INTAKE   I.V.(mL/kg) 97(1.3) 44.6(0.6)  141.6(1.8)   Shift Total(mL/kg) 97(1.3) 44.6(0.6)  141.6(1.8)   OUTPUT   Urine(mL/kg/hr) 120(0.2) 175(0.3)  295   Shift Total(mL/kg) 120(1.6) 175(2.3)  295(3.8) Weight (kg) 77.1 77.1 77.1 77.1     Wt Readings from Last 3 Encounters:   07/27/20 170 lb (77.1 kg)     Body mass index is 24.39 kg/m².         PHYSICAL EXAM:  Unresponsive       MEDICATIONS:  Scheduled Meds:   aspirin  81 mg Oral Daily    famotidine  20 mg Oral Daily    insulin lispro  0-12 Units Subcutaneous Q6H    insulin glargine  10 Units Subcutaneous Nightly    hydrocortisone sodium succinate PF  50 mg Intravenous Q6H    levothyroxine  50 mcg Intravenous Daily    And    sodium chloride (PF)  5 mL Intravenous Daily    sodium chloride flush  10 mL Intravenous 2 times per day    sodium chloride flush  10 mL Intravenous 2 times per day    atorvastatin  40 mg Oral Nightly     Continuous Infusions:   furosemide (LASIX) 5mg/ml infusion 40 mg/hr (08/04/20 0508)    sodium chloride Stopped (07/30/20 1841)    IV infusion builder 100 mL/hr at 08/01/20 1700    dextrose       PRN Meds:   artificial tears, , PRN  metoprolol, 5 mg, Q4H PRN  metoprolol, 2.5 mg, Q6H PRN  potassium chloride, 20 mEq, PRN  sodium chloride flush, 10 mL, PRN  acetaminophen, 650 mg, Q6H PRN    Or  acetaminophen, 650 mg, Q6H PRN  polyethylene glycol, 17 g, Daily PRN  promethazine, 12.5 mg, Q6H PRN    Or  ondansetron, 4 mg, Q6H PRN  nicotine, 1 patch, Daily PRN  LORazepam, 2 mg, Q4H PRN  diphenhydrAMINE, 25 mg, Q6H PRN  glucose, 15 g, PRN  dextrose, 12.5 g, PRN  glucagon (rDNA), 1 mg, PRN  dextrose, 100 mL/hr, PRN        SUPPORT DEVICES: [x] Ventilator [] BIPAP  [] Nasal Cannula [] Room Air    VENT SETTINGS (Comprehensive) (if applicable):    Vent Information  $Ventilation: $Subsequent Day  Skin Assessment: Clean, dry, & intact  Suction Catheter Diameter: 14  Equipment ID: [id number]  Equipment Changed: HME  Vent Type: Servo i  Vent Mode: PRVC  Vt Ordered: 530 mL  Rate Set: 14 bmp  Pressure Support: 6 cmH20  FiO2 : 30 %  SpO2: 100 %  SpO2/FiO2 ratio: 333.33  Sensitivity: 2  Flow by: 2  PEEP/CPAP: 5  I Time/ I Time %: 0.9 s  Humidification Source: HME  Nitric Oxide/Epoprostenol In Use?: No  Additional Respiratory  Assessments  Pulse: 64  Resp: 14  SpO2: 100 %  End Tidal CO2: 25 (%)  Position: Semi-[place]'s  Humidification Source: HME  Oral Care Completed?: No(layton blood from mouth.)  Oral Care: Mouthwash, Mouth swabbed, Mouth moisturizer, Mouth suctioned, Suction toothette  Subglottic Suction Done?: No  Cuff Pressure (cm H2O): (MLT)  Skin barrier applied: No    ABGs:     Lab Results   Component Value Date    KEB6SCV 18 08/04/2020    FIO2 30.0 08/04/2020       DATA:  Complete Blood Count:   Recent Labs     08/02/20  0550 08/03/20  0534 08/04/20  0420   WBC 13.5* 13.4* 15.0*   RBC 2.63* 2.60* 2.55*   HGB 7.4* 7.2* 7.2*   HCT 22.6* 22.2* 21.8*   MCV 85.9 85.4 85.5   MCH 28.1 27.7 28.2   MCHC 32.7 32.4 33.0   RDW 15.4* 15.1* 14.8*   PLT See Reflexed IPF Result 79* 151   MPV NOT REPORTED 12.6 11.6        Last 3 Blood Glucose:   Recent Labs     08/02/20  0550 08/03/20  0534 08/04/20  0420   GLUCOSE 94 122* 150*        PT/INR:    Lab Results   Component Value Date    PROTIME 10.9 07/28/2020    INR 1.0 07/28/2020     PTT:    Lab Results   Component Value Date    APTT 24.7 08/04/2020       Comprehensive Metabolic Profile:   Recent Labs     08/02/20  0550  08/03/20  0534 08/03/20  0930 08/03/20  1709 08/04/20  0420      < > 137 139 140 138   K 4.9   < > 5.0 5.0 5.1 5.0      < > 97* 100 100 98   CO2 18*   < > 16* 16* 16* 15*   BUN 99*  --  136*  --   --  172*   CREATININE 7.34*  --  9.08*  --   --  9.98*   GLUCOSE 94  --  122*  --   --  150*   CALCIUM 7.1*  --  7.0*  --   --  6.7*    < > = values in this interval not displayed.       Magnesium:   Lab Results   Component Value Date    MG 1.4 07/31/2020    MG 1.5 07/30/2020    MG 1.7 07/30/2020     Phosphorus:   Lab Results   Component Value Date    PHOS 2.3 07/30/2020     Ionized Calcium: No results found for: CAION     Urinalysis:   Lab Results   Component Value Date    NITRU NEGATIVE 07/27/2020 COLORU YELLOW 07/27/2020    PHUR 5.0 07/27/2020    WBCUA None 07/27/2020    RBCUA 0 TO 2 07/27/2020    MUCUS NOT REPORTED 07/27/2020    TRICHOMONAS NOT REPORTED 07/27/2020    YEAST NOT REPORTED 07/27/2020    BACTERIA NOT REPORTED 07/27/2020    SPECGRAV 1.052 07/27/2020    LEUKOCYTESUR NEGATIVE 07/27/2020    UROBILINOGEN Normal 07/27/2020    BILIRUBINUR NEGATIVE 07/27/2020    GLUCOSEU NEGATIVE 07/27/2020    KETUA NEGATIVE 07/30/2020    AMORPHOUS NOT REPORTED 07/27/2020       HgBA1c:    Lab Results   Component Value Date    LABA1C 5.3 07/28/2020     TSH:    Lab Results   Component Value Date    TSH <0.01 07/28/2020       Xr Chest (single View Frontal)    Result Date: 7/30/2020  Satisfactory position of the lines and catheters. Cardiomegaly with perihilar edema. Ct Head Wo Contrast    Result Date: 7/27/2020  No acute intracranial abnormality. Findings were communicated electronically to Dr. Joaquina Wynne through the radiology results communication center. Us Renal Complete    Result Date: 7/30/2020  Increased cortical echogenicity consistent with medical renal disease. No hydronephrosis or nephrolithiasis. Increased fluid in the bowel. Xr Chest Portable    Result Date: 7/29/2020  Right IJ central line appears in satisfactory position without evidence of pneumothorax. Endotracheal tube also appears in satisfactory position. Ct Chest Pulmonary Embolism W Contrast    Result Date: 7/27/2020  No evidence of pulmonary embolism or acute pulmonary abnormality. Cta Head Neck W Contrast    Result Date: 7/27/2020  No large vessel occlusion or hemodynamic stenosis Multilevel degenerate changes cervical spine. Mri Limited Brain    Result Date: 7/31/2020  Evidence of diffusely increased diffusion signal throughout the cortex. Clinical correlation is strongly recommended. Findings can be seen with hypoxic ischemic injury in the appropriate clinical setting.         ASSESSMENT:     Acute hypoxic respiratory failure due to covid -19 pneumonia     Lactic acidosis   PEA cardiac arrest   Anoxic brain injury   Shock - adrenal better   LEILA   Hypokalemia     COVID-19 virus infection    Secondary hypothyroidism    History of pituitary tumor    Seizure (HCC)    Somnolence    Pituitary insufficiency (HCC)    High anion gap metabolic acidosis    New onset atrial fibrillation (HCC)    Pulseless electrical activity (HCC)  Resolved Problems:    * No resolved hospital problems. *       PLAN:   1. Current condition mostly related to severe anoxic brain injury in the context of multiple PEA arrests complicated by [id number] virus infection. 2. Continue ventilator support. 3. Agree with recommendations for palliative care and withdrawal of support. 4. Discussed with Dr. Jayro Tsang.    Electronically signed by Sharon Soni DO on 8/4/2020 at 5:22 PM   Crit Care time 30min

## 2020-08-04 NOTE — PROGRESS NOTES
NEUROLOGY INPATIENT PROGRESS NOTE    8/4/2020         Current Exam:     Chart reviewed. Discussed with RN. Patient continues off of sedation. Nursing reports mild withdrawal in all 4 limbs but with no spontaneous or purposeful movement. RN reports patient does not have a cough, gag, corneals, and does not breathe over the vent. Pupils are reactive. Brief History:    Christopher Munson is a  [age] [facility] y.o. male with H/O pituitary tumor resection (2016), DM, who was admitted on 7/27/2020 after being found unresponsive at work; EMS found him febrile at [address]. On arrival he was lethargic with slurred speech and RLE weakness. CT head negative, CTA head and neck unremarkable. He was loaded with IV Keppra 1 gram and continued on maintenance dosing 500mg BID. EEG with moderate to severe encephalopathy with near continuous frontally dominant rhythmic delta activity concerning for subclinical status; Keppra maintenance dose was increased to 1 gram twice daily. He was found to be COVID + and was transferred to [facility] unit. On 7/29 patient had a PEA arrest undergoing 2 rounds of CPR; he reportedly coded two more times, was intubated and transferred to [facility] ICU where he has another PEA arrest. During one code, his blood sugar was found to be 11. Patient was started on LTME with diffuse slowing. No current facility-administered medications on file prior to encounter. No current outpatient medications on file prior to encounter. Allergies: Christopher Munson has No Known Allergies. Past Medical History:   Diagnosis Date    Depression     ED (erectile dysfunction)     Hypothyroid     Migraine     Pituitary tumor     Type 2 diabetes mellitus ([facility] [address]     resolved with 50# weight loss       Past Surgical History:   Procedure Laterality Date    PITUITARY EXCISION  10/05/2016       Social History: Christopher Munson  reports that he has never smoked.  He has never used smokeless tobacco. He reports that he does not drink acute intracranial abnormality      MRI BRAIN (7/31/2020):   Evidence of diffusely increased diffusion signal throughout the cortex;    hypoxic ischemic injury in the appropriate clinical setting.      CTA HEAD & NECK (7/27/2020): Unremarkable        LTME (7/28 - 7/29/2020):  Day 1: During this day of recording no events were recorded. The interictal EEG was abnormal due to diffuse polymorphic delta theta slowing suggesting moderate to severe encephalopathy.  Near continuous frontally dominant rhythmic delta activity appeared to be on ictal-interictal spectrum and was concerning for subclinical seizures     Day 2: During this day of recording no events were recorded. The interictal EEG was abnormal due to diffuse polymorphic delta theta slowing suggesting moderate to severe encephalopathy.  Near continuous frontally dominant rhythmic delta activity appeared to be on ictal-interictal spectrum and was concerning for subclinical seizures. The EEG was essentially unchanged from yesterday       EEG (7/31/2020): This EEG was severely abnormal.  No clear discernible cerebral activity was seen.  This finding is suggestive of diffuse cortical dysfunction or can be from sedation medication effect        REPEAT LTME (7/31-  No events were recorded. The interictal EEG was severely abnormal due to attenuated background activity which suggested diffuse cortical dysfunction. Impression:  -Anoxic encephalopathy s/p cardiac arrest x4  -New onset A fib post arrest  -COVID-19  -LEILA    Plan:  -The patient continues off of sedation and has no purposeful movement with absence of most brainstem reflexes. MRI brain with evidence of diffuse hypoxic ischemic injury and EEG with severe diffuse encephalopathy with very poor prognosis. Chance of meaningful neurologic recovery felt to be quite unlikely.   -Ethics is following  -We will follow    Please note that this note was generated using a voice recognition dictation software. Although every effort was made to ensure the accuracy of this automated transcription, some errors in transcription may have occurred.

## 2020-08-04 NOTE — PROGRESS NOTES
Progress Note    Patient - Diana Robles  Date of Admission -  2020  8:06 PM  Date of Evaluation -  8/3/2020  Room and Bed Number -  4289/5566-46   Hospital Day - 7    CHIEF COMPLAINT : covid -19 pneumonia    SUBJECTIVE:     OVERNIGHT EVENTS:         Not respond to deep painful stimuli. Breathes above the vent. Does not tolerate spontaneous breathing trial.  Neurology input noted. Severe anoxic injury absence of most brainstem reflexes attenuated EEG. Patient currently DNR CC arrest although family conflicted about withdrawing support.   AWAKE & FOLLOWING COMMANDS:  [x] No   [] Yes    SECRETIONS Amount:  [x] Small [] Moderate  [] Large  [] None  Color:     [x] White [] Colored  [] Bloody    SEDATION:  RAAS Score:  [] Propofol gtt  [] Versed gtt  [] Ativan gtt   [x] No Sedation    PARALYZED:  [x] No    [] Yes    VASOPRESSORS:  [x] No    [] Yes  [] Levophed [] Dopamine [] Vasopressin  [] Dobutamine [] Phenylephrine [] Epinephrine      OBJECTIVE:     VITAL SIGNS:  /65   Pulse 68   Temp 98.8 °F (37.1 °C) (Esophageal)   Resp 16   Ht 5' 10\" (1.778 m)   Wt 170 lb (77.1 kg)   SpO2 100%   BMI 24.39 kg/m²   Tmax over 24 hours:  Temp (24hrs), Av.3 °F (36.3 °C), Min:96.4 °F (35.8 °C), Max:98.8 °F (37.1 °C)      Patient Vitals for the past 8 hrs:   BP Temp Temp src Pulse Resp SpO2 Height   20 1950 -- -- -- 68 16 100 % --   20 1900 119/65 -- -- 68 16 100 % --   20 1800 121/64 -- -- 75 15 100 % --   20 1700 121/60 -- -- 78 17 100 % --   20 1600 123/65 98.8 °F (37.1 °C) Esophageal 80 19 100 % --   20 1500 123/60 -- -- 82 18 100 % --   20 1438 -- -- -- -- -- -- 5' 10\" (1.778 m)   20 1400 (!) 141/67 -- -- 96 18 100 % --   20 1356 -- -- -- 92 (!) 34 100 % --   20 1355 -- -- -- 92 (!) 34 99 % --   20 1300 128/64 -- -- 87 29 100 % --         Intake/Output Summary (Last 24 hours) at 8/3/2020 2007  Last data filed at 8/3/2020 1600  Gross per 24 hour Intake 425 ml   Output 500 ml   Net -75 ml     Date 08/03/20 0000 - 08/03/20 2359   Shift 2049-5846 [id number] 24 Hour Total   INTAKE   I.V.(mL/kg) 192(2.5) 141(1.8) 42(0.5) 375(4.9)   NG/GT(mL/kg)  50(0.6)  50(0.6)   Shift Total(mL/kg) 192(2.5) 191(2.5) 42(0.5) 425(5.5)   OUTPUT   Urine(mL/kg/hr) 185(0.3) 155(0.3) 100 440   Stool(mL/kg)   0(0) 0(0)   Shift Total(mL/kg) 185(2.4) 155(2) 100(1.3) 440(5.7)   Weight (kg) 77.1 77.1 77.1 77.1     Wt Readings from Last 3 Encounters:   07/27/20 170 lb (77.1 kg)     Body mass index is 24.39 kg/m².         PHYSICAL EXAM:  Unresponsive       MEDICATIONS:  Scheduled Meds:   aspirin  81 mg Oral Daily    [START ON 8/4/2020] famotidine  20 mg Oral Daily    insulin lispro  0-12 Units Subcutaneous Q6H    insulin glargine  10 Units Subcutaneous Nightly    hydrocortisone sodium succinate PF  50 mg Intravenous Q6H    levothyroxine  50 mcg Intravenous Daily    And    sodium chloride (PF)  5 mL Intravenous Daily    sodium chloride flush  10 mL Intravenous 2 times per day    sodium chloride flush  10 mL Intravenous 2 times per day    atorvastatin  40 mg Oral Nightly     Continuous Infusions:   furosemide (LASIX) 5mg/ml infusion 40 mg/hr (08/03/20 1700)    sodium chloride Stopped (07/30/20 1841)    IV infusion builder 100 mL/hr at 08/01/20 1700    dextrose       PRN Meds:   metoprolol, 5 mg, Q4H PRN  metoprolol, 2.5 mg, Q6H PRN  potassium chloride, 20 mEq, PRN  sodium chloride flush, 10 mL, PRN  acetaminophen, 650 mg, Q6H PRN    Or  acetaminophen, 650 mg, Q6H PRN  polyethylene glycol, 17 g, Daily PRN  promethazine, 12.5 mg, Q6H PRN    Or  ondansetron, 4 mg, Q6H PRN  nicotine, 1 patch, Daily PRN  LORazepam, 2 mg, Q4H PRN  diphenhydrAMINE, 25 mg, Q6H PRN  glucose, 15 g, PRN  dextrose, 12.5 g, PRN  glucagon (rDNA), 1 mg, PRN  dextrose, 100 mL/hr, PRN        SUPPORT DEVICES: [x] Ventilator [] BIPAP  [] Nasal Cannula [] Room Air    VENT SETTINGS (Comprehensive) (if applicable):    Vent Information  $Ventilation: $Subsequent Day  Skin Assessment: Clean, dry, & intact  Suction Catheter Diameter: 14  Equipment ID: [id number]  Equipment Changed: HME  Vent Type: Servo i  Vent Mode: PRVC  Vt Ordered: 530 mL  Rate Set: 14 bmp  Pressure Support: 6 cmH20  FiO2 : 30 %  SpO2: 100 %  SpO2/FiO2 ratio: 333.33  Sensitivity: 2  Flow by: 2  PEEP/CPAP: 5  I Time/ I Time %: 0.95 s  Humidification Source: HME  Nitric Oxide/Epoprostenol In Use?: No  Additional Respiratory  Assessments  Pulse: 68  Resp: 16  SpO2: 100 %  End Tidal CO2: 23 (%)  Position: Semi-[place]'s  Humidification Source: HME  Oral Care Completed?: Yes  Oral Care: Mouth swabbed, Mouth moisturizer, Mouth suctioned  Subglottic Suction Done?: No  Cuff Pressure (cm H2O): (MLT)  Skin barrier applied: No    ABGs:     Lab Results   Component Value Date    BJI1YNO 19 08/03/2020    FIO2 30.0 08/03/2020       DATA:  Complete Blood Count:   Recent Labs     08/01/20  0536 08/02/20  0550 08/03/20  0534   WBC 15.9* 13.5* 13.4*   RBC 2.85* 2.63* 2.60*   HGB 8.0* 7.4* 7.2*   HCT 24.9* 22.6* 22.2*   MCV 87.4 85.9 85.4   MCH 28.1 28.1 27.7   MCHC 32.1 32.7 32.4   RDW 15.7* 15.4* 15.1*   PLT See Reflexed IPF Result See Reflexed IPF Result 79*   MPV NOT REPORTED NOT REPORTED 12.6        Last 3 Blood Glucose:   Recent Labs     08/01/20  0536 08/02/20  0550 08/03/20  0534   GLUCOSE 94 94 122*        PT/INR:    Lab Results   Component Value Date    PROTIME 10.9 07/28/2020    INR 1.0 07/28/2020     PTT:    Lab Results   Component Value Date    APTT 26.3 08/03/2020       Comprehensive Metabolic Profile:   Recent Labs     08/01/20  0536  08/02/20  0550  08/03/20  0534 08/03/20  0930 08/03/20  1709      < > 138   < > 137 139 140   K 4.6   < > 4.9   < > 5.0 5.0 5.1      < > 100   < > 97* 100 100   CO2 20   < > 18*   < > 16* 16* 16*   BUN 65*  --  99*  --  136*  --   --    CREATININE 6.04*  --  7.34*  --  9.08*  --   --    GLUCOSE 94  --  94  --  122* --   --    CALCIUM 7.1*  --  7.1*  --  7.0*  --   --     < > = values in this interval not displayed. Magnesium:   Lab Results   Component Value Date    MG 1.4 07/31/2020    MG 1.5 07/30/2020    MG 1.7 07/30/2020     Phosphorus:   Lab Results   Component Value Date    PHOS 2.3 07/30/2020     Ionized Calcium: No results found for: CAION     Urinalysis:   Lab Results   Component Value Date    NITRU NEGATIVE 07/27/2020    COLORU YELLOW 07/27/2020    PHUR 5.0 07/27/2020    WBCUA None 07/27/2020    RBCUA 0 TO 2 07/27/2020    MUCUS NOT REPORTED 07/27/2020    TRICHOMONAS NOT REPORTED 07/27/2020    YEAST NOT REPORTED 07/27/2020    BACTERIA NOT REPORTED 07/27/2020    SPECGRAV 1.052 07/27/2020    LEUKOCYTESUR NEGATIVE 07/27/2020    UROBILINOGEN Normal 07/27/2020    BILIRUBINUR NEGATIVE 07/27/2020    GLUCOSEU NEGATIVE 07/27/2020    KETUA NEGATIVE 07/30/2020    AMORPHOUS NOT REPORTED 07/27/2020       HgBA1c:    Lab Results   Component Value Date    LABA1C 5.3 07/28/2020     TSH:    Lab Results   Component Value Date    TSH <0.01 07/28/2020       Xr Chest (single View Frontal)    Result Date: 7/30/2020  Satisfactory position of the lines and catheters. Cardiomegaly with perihilar edema. Ct Head Wo Contrast    Result Date: 7/27/2020  No acute intracranial abnormality. Findings were communicated electronically to Dr. Nataly Carbajal through the radiology results communication center. Us Renal Complete    Result Date: 7/30/2020  Increased cortical echogenicity consistent with medical renal disease. No hydronephrosis or nephrolithiasis. Increased fluid in the bowel. Xr Chest Portable    Result Date: 7/29/2020  Right IJ central line appears in satisfactory position without evidence of pneumothorax. Endotracheal tube also appears in satisfactory position. Ct Chest Pulmonary Embolism W Contrast    Result Date: 7/27/2020  No evidence of pulmonary embolism or acute pulmonary abnormality.      Cta Head Neck W Contrast    Result Date: 7/27/2020  No large vessel occlusion or hemodynamic stenosis Multilevel degenerate changes cervical spine. Mri Limited Brain    Result Date: 7/31/2020  Evidence of diffusely increased diffusion signal throughout the cortex. Clinical correlation is strongly recommended. Findings can be seen with hypoxic ischemic injury in the appropriate clinical setting. ASSESSMENT:     Acute hypoxic respiratory failure due to covid -19 pneumonia     Lactic acidosis   PEA cardiac arrest   Anoxic brain injury   Shock - adrenal better   LEILA   Hypokalemia     COVID-19 virus infection    Secondary hypothyroidism    History of pituitary tumor    Seizure (HCC)    Somnolence    Pituitary insufficiency (HCC)    High anion gap metabolic acidosis    New onset atrial fibrillation (HCC)    Pulseless electrical activity (HCC)  Resolved Problems:    * No resolved hospital problems. *       PLAN:   1. .  Cont to work with large family Re: end-of-life issues And palliative care. 2. Daily spontaneous breathing trial.  3. May need to consider trach, although rehab potential ill.    Electronically signed by Jayne Matt DO on 8/3/2020 at 8:07 PM   Crit Care time 30min

## 2020-08-04 NOTE — PROGRESS NOTES
Palliative Care Progress Note    NAME:  Velia Guajardo  MEDICAL RECORD NUMBER:  [id number]  AGE: 60 y.o. GENDER: male  : 1960  TODAY'S DATE:  2020    Reason for Consult:  goals of care, code status discussion, family support   History of Present Illness     The patient is a 60 y.o. Non-/non  male who presents with Altered Mental Status      Referred to Palliative Care by  [x] Physician   [] Nursing  [] Family Request   [] Other:       He was admitted to the ICU service for AMS (altered mental status) [R41.82]. His hospital course has been associated with Covid 19, anemia, thrombocytopenia, acute renal failure, diarrhea, atrial fibrillation, acute respiratory failure requiring intubation, cardiac arrest with ROSC, H/o pituitary tumor, metabolic encephalopathy, Possible new onset of seizure, shock, anoxic brain injury, hyponatremia, lactic acidosis, and metabolic acidosis. . The patient has a complicated medical history and has been hospitalized since 2020  8:06 PM. Patient remains on ventilator and FIO2 30% and Peep 5. Patient did not tolerate his breathing trial. Patient is not currently on pressors or sedation at present. EEG showed severe difuse encephalopathy. His  and CR 9.98 and Lasix drip continues. Nephrology reports he is not a candidate for dialysis. Attempted to schedule a Zoom conference with Aurelia Haney patient daughter and she denied the meeting. Palliative care will continue to follow patient and provide emotional support. OVERNIGHT EVENTS:  Patient is a DNR-CCA  Patient on ventilator with FIO2 30 and Peep 5  Patient EEG showed severe anoxic injury     /71   Pulse 66   Temp 98.8 °F (37.1 °C) (Esophageal)   Resp 14   Ht 5' 10\" (1.778 m)   Wt 170 lb (77.1 kg)   SpO2 100%   BMI 24.39 kg/m²     Assessment        REVIEW OF SYSTEMS    [x]   UNABLE TO OBTAIN:   Unable to assess ROS due to COVID isolation protocols and preservation of PPE.        PHYSICAL ASSESSMENT:     General: []  Oriented x3      [] well appearing      [x] Intubated      [x] ill appearing      [x] Other: Non responsive to verbal commands Mild withdrawal to painful stimuli  Mental Status: [] normal mental status exam      [] drowsy      [] Confused      [x] Other: Non responsive to verbal commands Mild withdrawal to painful stimuli    Cardiovascular: [x]  Regular rate/rhythm      [] Arrhythmia      [] Other: Patient HR 63 per monitor edema present    Chest: [] Effort normal      [] lungs clear      [] respiratory distress      [] Tachypnea      [x]  Other: Patient on ventilator FIO2 30% and Peep 5  Abdomen: [] Soft/non-tender      []  Normal appearance      [] Distended      [] Ascites      [x] Other:OG   Neurological: [] Normal Speech      [] Normal Sensation      []  Deficits present:  Non responsive to verbal commands Mild withdrawal to painful stimuli      Extremity:  [] normal skin color/temp      [] clubbing/cyanosis      []  No edema      [x] Other: edema present     Palliative Performance Scale:  ___60%  Ambulation reduced; Significant disease; Can't do hobbies/housework; intake normal or reduced; occasional assist; LOC full/confusion  ___50%  Mainly sit/lie; Extensive disease; Can't do any work; Considerable assist; intake normal or reduced; LOC full/confusion  ___40%  Mainly in bed; Extensive disease; Mainly assist; intake normal or reduced; LOC full/confusion   ___30%  Bed Bound; Extensive disease; Total care; intake reduced; LOCfull/confusion  ___20%  Bed Bound; Extensive disease; Total care; intake minimal; Drowsy/coma  _x__10%  Bed Bound; Extensive disease; Total care; Mouth care only; Drowsy/coma  ___0       Death      Plan      Palliative Interaction:   Called Grace to update her on her father's condition today and to set up [phone] with patient's children so nephrology and PCP could update patient's children on their dad's condition.  Johana Mckeon stated that they already had a meeting with the doctors and they know his kidneys are failing and there has been injury to his brain. She stated that they did not need a zoom meeting at present. Informed Irajfrancisco Rochack that I would let the teams know. Palliative care team will continue to follow patient and provide support to the family. Education/support to family  Discharge planning/helping to coordinate care  Communications with primary service  Pharmacologic pain management  Providing support for coping/adaptation/distress of family  Discussing meaning/purpose   Caregiver support/education  Code status clarified: DNRCCA  Principle Problem/Diagnosis:  COVID-19    Additional Assessments:  Principal Problem:    COVID-19  Active Problems:    Toxic metabolic encephalopathy    Secondary hypothyroidism    History of pituitary tumor    Seizure ([address]    Somnolence    Shock circulatory ([address]    Pituitary insufficiency (HCC)    High anion gap metabolic acidosis    New onset atrial fibrillation (HCC)    Pulseless electrical activity ([facility]    LEILA (acute kidney injury) ([facility]    Anoxic encephalopathy ([facility]    Palliative care encounter    1- Symptom management/ pain control     Pain Assessment:  Pain is controlled with current analgesics. Medication(s) being used: acetaminophen.                Anxiety:  Ativan patient on ventilator                           Dyspnea:  Ativan and patient on a ventilator                           Fatigue:  generalized weakness     Other:      2- Goals of care evaluation   The patient goals of care are improve or maintain function/quality of life   Goals of care discussed with:    [] Patient independently    [] Patient and Family    [x] Family or Healthcare DPOA independently    [] Unable to discuss with patient, family/DPOA not present    3- Code Status  DNR-CCA    4- Other recommendations  - We will continue to provide comfort and support to the patient and the family        Palliative Care will continue to follow  [facility] care as needed. The note has been dictated by dragon, typing errors may be a possibility     Thank you for allowing Palliative Care to participate in the care of Mr. Jyoti Sequeira . Electronically signed by   DARREN Calvillo NP  Palliative Care Team  on 8/4/2020 at 4:40 PM    Please call with any palliative questions or concerns. Palliative Care Team is available via perfect serve or via phone.

## 2020-08-05 NOTE — PROGRESS NOTES
distended with positive bowel sounds, NG tube feedings at 10 ml/hr  Extremities: Trace to 1+ peripheral edema    CURRENT MEDICATIONS      lubrifresh P.M. (artificial tears) ophthalmic ointment, PRN  hydrocortisone sodium succinate PF (SOLU-CORTEF) injection 25 mg, Q8H  aspirin chewable tablet 81 mg, Daily  famotidine (PEPCID) tablet 20 mg, Daily  furosemide (LASIX) 500 mg in dextrose 5 % 100 mL infusion, Continuous  metoprolol (LOPRESSOR) injection 5 mg, Q4H PRN  insulin lispro (HUMALOG) injection vial 0-12 Units, Q6H  insulin glargine (LANTUS) injection vial 10 Units, Nightly  metoprolol (LOPRESSOR) injection 2.5 mg, Q6H PRN  levothyroxine (SYNTHROID) injection 50 mcg, Daily    And  sodium chloride (PF) 0.9 % injection 5 mL, Daily  sodium chloride flush 0.9 % injection 10 mL, 2 times per day  potassium chloride 20 mEq/50 mL IVPB (Central Line), PRN  sodium chloride flush 0.9 % injection 10 mL, 2 times per day  sodium chloride flush 0.9 % injection 10 mL, PRN  acetaminophen (TYLENOL) tablet 650 mg, Q6H PRN    Or  acetaminophen (TYLENOL) suppository 650 mg, Q6H PRN  polyethylene glycol (GLYCOLAX) packet 17 g, Daily PRN  promethazine (PHENERGAN) tablet 12.5 mg, Q6H PRN    Or  ondansetron (ZOFRAN) injection 4 mg, Q6H PRN  nicotine (NICODERM CQ) 21 MG/24HR 1 patch, Daily PRN  LORazepam (ATIVAN) injection 2 mg, Q4H PRN  diphenhydrAMINE (BENADRYL) injection 25 mg, Q6H PRN  atorvastatin (LIPITOR) tablet 40 mg, Nightly  glucose (GLUTOSE) 40 % oral gel 15 g, PRN  dextrose 50 % IV solution, PRN  glucagon (rDNA) injection 1 mg, PRN  dextrose 5 % solution, PRN      LABS      CBC:   Recent Labs     08/03/20  0534 08/04/20  0420 08/05/20  0409   WBC 13.4* 15.0* 19.5*   RBC 2.60* 2.55* 2.54*   HGB 7.2* 7.2* 7.4*   HCT 22.2* 21.8* 22.1*   MCV 85.4 85.5 87.0   MCH 27.7 28.2 29.1   MCHC 32.4 33.0 33.5   RDW 15.1* 14.8* 15.0*   PLT 79* 151 226   MPV 12.6 11.6 10.7      BMP:   Recent Labs     08/03/20  0534  08/03/20  1702 08/04/20  0420 08/05/20  0409      < > 140 138 142   K 5.0   < > 5.1 5.0 5.0   CL 97*   < > 100 98 98   CO2 16*   < > 16* 15* 15*   *  --   --  172* 204*   CREATININE 9.08*  --   --  9.98* 11.76*   GLUCOSE 122*  --   --  150* 92   CALCIUM 7.0*  --   --  6.7* 6.9*    < > = values in this interval not displayed. FERRITIN:    Lab Results   Component Value Date    FERRITIN 471 07/28/2020     JOEL:   Lab Results   Component Value Date    JOEL NEGATIVE 07/30/2020       SPEP:   Lab Results   Component Value Date    PROT 4.3 07/30/2020    ALBCAL 2.4 07/30/2020    ALBPCT 56 07/30/2020    LABALPH 0.2 07/30/2020    LABALPH 0.3 07/30/2020    A1PCT 4 07/30/2020    A2PCT 8 07/30/2020    LABBETA 0.5 07/30/2020    BETAPCT 12 07/30/2020    GAMGLOB 0.9 07/30/2020    GGPCT 20 07/30/2020    PATH ELECTRONICALLY SIGNED. Ericka Andrade M.D. 07/30/2020    PATH ELECTRONICALLY SIGNED. Ericka Andrade M.D. 07/30/2020     C3:   Lab Results   Component Value Date    C3 70 (L) 07/30/2020     C4:   Lab Results   Component Value Date    C4 14 07/30/2020       URINE CREATININE:    Lab Results   Component Value Date    LABCREA 24.4 07/30/2020     URINE EOSINOPHILS:   Lab Results   Component Value Date    UREO NONE SEEN 07/30/2020     URINALYSIS:  U/A:   Lab Results   Component Value Date    NITRU NEGATIVE 07/27/2020    COLORU YELLOW 07/27/2020    PHUR 5.0 07/27/2020    WBCUA None 07/27/2020    RBCUA 0 TO 2 07/27/2020    MUCUS NOT REPORTED 07/27/2020    TRICHOMONAS NOT REPORTED 07/27/2020    YEAST NOT REPORTED 07/27/2020    BACTERIA NOT REPORTED 07/27/2020    SPECGRAV 1.052 07/27/2020    LEUKOCYTESUR NEGATIVE 07/27/2020    UROBILINOGEN Normal 07/27/2020    BILIRUBINUR NEGATIVE 07/27/2020    GLUCOSEU NEGATIVE 07/27/2020    KETUA NEGATIVE 07/30/2020    AMORPHOUS NOT REPORTED 07/27/2020     ASSESSMENT      1. Acute kidney injury secondary to ischemic ATN. Patient has markedly elevated BUN and creatinine.   Based on neurological status and severe anoxic encephalopathy patient is not a reasonable candidate for dialysis. 2.  Status post cardiorespiratory arrest   3. Respiratory failure post cardiac arrest on the ventilator, patient is on 30% FiO2  4. COVID-19 positive  5. Possible seizures  6. Anoxic brain injury  7. CODE STATUS is DNR CC Arrest  8. Metabolic acidosis, relatively stable    PLAN      1. Continue Lasix infusion  2.. Daily BMP  3. No dialysis planned as poor candidate for a meaningful recovery  4. Recommend palliation       Please do not hesitate to call with questions.     Electronically signed by Jennifer Briggs MD on 8/5/2020 at 9:35 AM  Nephrology [address] [place] [address]

## 2020-08-05 NOTE — PROGRESS NOTES
[facility]  Progress Note    Abbi Gordillo  MRN: [id number]  Date: 8/5/2020    Reason for F/U :   COVID-19 virus infection    Impression :   1. Cardiopulmonary arrest-multiple status post successful resuscitation  2. Anoxic brain injury  3. Acute hypoxic respiratory failure currently ventilator dependent  4. Shock-resolved  · off pressors   5. New onset seizure  6. COVID-19 virus infection without evidence of pneumonia  7. History of pituitary tumor status post resection 2016  8. Acute kidney injury-worsening  9. Hypothyroidism  10. Diabetes mellitus type 2  11. Diarrhea  12. Anasarca    Recommendations:   · The patient continues off systemic antimicrobial therapy. · The COVID-19 infection does not require treatment. · CODE STATUS is DNR CC arrest.  · The patient continues to have significant neurological dysfunction  · Chance of meaningful recovery is poor    Infection Control Recommendations:   COVID-19 virus infection    Discharge Planning:   Patient will need Midline Catheter Insertion/ PICC line Insertion: No  Patient willneed outpatient wound care: No    MedicalDecision making / Summary of Stay:   Abbi Gordillo is a 60y.o.-year-old male who was initially admitted on 7/27/2020. Jamison Cadena is seen and evaluated at bedside and the chart was reviewed. He is a poor historian and I was not able to get any history from him. 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 He was last known to be well at about 2 PM when a work colleague checked in on him but he was subsequently found down unresponsive for an unknown period of time. No witnessed seizures. EMS was called and temperature was 102 them on arrival in the emergency department was 100.7. The patient also lost control of bowel and bladder and was noted to have tongue biting. COVID-19 virus testing was positive and the patient was admitted to the [facility] unit.   The patient does have a history of a pituitary tumor which was resected 2 years ago.  The patient is suspected to have had a seizure and I was asked to evaluate for the COVID-19 infection. The patient does not recall the events of what happened and does not able to get much out of him today. The EEG showed diffuse polymorphic delta theta slowing suggestive of moderate to severe encephalopathy and frontal dominant rhythm delta activity concerning for subclinical seizure. On 2020 the patient had clinical deterioration with a PEA arrest and after successful resuscitation he had subsequent PEA and asystolic arrest requiring ACLS protocol. The patient was intubated started on Levophed for blood pressure support and was transferred to the intensive care unit    The patient has minimal neurological response off sedation/ significant neurological dysfunction    The MRI seems to show some anoxic brain injury. Current evaluation:2020    /71   Pulse 66   Temp 96.1 °F (35.6 °C) (Esophageal)   Resp 13   Ht 5' 10\" (1.778 m)   Wt 165 lb 4.8 oz (75 kg)   SpO2 100%   BMI 23.72 kg/m²     Temperature Range: Temp: 96.1 °F (35.6 °C) Temp  Av.6 °F (35.9 °C)  Min: 96.1 °F (35.6 °C)  Max: 97 °F (36.1 °C)  The patient is seen and evaluated at bedside he is on the ventilator at 30% FiO2 and remains with significant cognitive deficiencies  The patient did have some hypoxia earlier today related to a mucous plug. He remains off all sedatives and pressors. He does not have any purposeful movements    Review of Systems   Unable to perform ROS: Intubated       Physical Examination :     Physical Exam  Constitutional:       Appearance: He is well-developed. Interventions: He is intubated. HENT:      Head: Normocephalic and atraumatic. Eyes:      Comments: There is some chemosis   Cardiovascular:      Rate and Rhythm: Normal rate. Heart sounds: Normal heart sounds. Pulmonary:      Effort: Pulmonary effort is normal. He is intubated.       Breath sounds: Normal breath sounds. Abdominal:      General: Bowel sounds are normal. There is distension. Palpations: Abdomen is soft. Musculoskeletal:      Comments: The patient has bilateral upper and lower extremity edema   Skin:     General: Skin is warm and dry. Neurological:      Comments: The patient did have some nonpurposeful movements of his head and upper extremity         Laboratory data:   I have independently reviewed the followinglabs:  CBC with Differential:   Recent Labs     08/04/20  0420 08/05/20  0409   WBC 15.0* 19.5*   HGB 7.2* 7.4*   HCT 21.8* 22.1*    226     BMP:   Recent Labs     08/04/20  0420 08/05/20  0409    142   K 5.0 5.0   CL 98 98   CO2 15* 15*   * 204*   CREATININE 9.98* 11.76*     Hepatic Function Panel:   No results for input(s): PROT, LABALBU, BILIDIR, IBILI, BILITOT, ALKPHOS, ALT, AST in the last 72 hours. No results for input(s): VANCOTROUGH in the last 72 hours. No results found for: CRP  No results found for: SEDRATE    No results for input(s): PROCAL in the last 72 hours. D-Dimer, Quant  7.55  mg/L FEU  Final  08/03/2020  5:34 AM  [facility]      Fibrinogen  428High    140 - 420 mg/dL  Final  08/03/2020  5:34 AM  [facility]          D-Dimer, Quant  7.35  mg/L FEU  Final  08/01/2020  5:36 AM  [facility]      Fibrinogen  429High    140 - 420 mg/dL  Final  08/01/2020  5:36 AM  [facility]        D-Dimer, Quant  24.85  mg/L FEU  Final  07/31/2020  4:00 AM  [facility]      Fibrinogen  415  140 - 420 mg/dL  Final  07/31/2020  4:00 AM  [facility]        Lactic Acid, Whole Blood  5. 4High    0.7 - 2.1 mmol/L  Final  07/30/2020  9:40 AM  [facility]      -Dimer, Quant  58.48  mg/L FEU  Final  07/30/2020  4:19 AM  [facility]      Fibrinogen  291  140 - 420 mg/dL  Final  07/30/2020  4:19 AM  [facility]        Lactic Acid, Whole Blood  10. 3High    0.7 - 2.1 mmol/L  Final  07/29/2020  1:24 PM  [address]        Imaging Studies:   MRI OF THE BRAIN WITHOUT CONTRAST  7/31/2020 7:13 pm  Impression    Evidence of diffusely increased diffusion signal throughout the cortex. Clinical correlation is strongly recommended.  Findings can be seen with    hypoxic ischemic injury in the appropriate clinical setting. ONE XRAY VIEW OF THE CHEST 7/30/2020 6:12 pm  Impression    Satisfactory position of the lines and catheters.         Cardiomegaly with perihilar edema.               RETROPERITONEAL ULTRASOUND OF THE KIDNEYS 7/30/2020   Impression    Increased cortical echogenicity consistent with medical renal disease.  No    hydronephrosis or nephrolithiasis.  Increased fluid in the bowel. Cultures:     Culture, Blood 2 [[id number]]   Collected: 07/30/20 0157    Order Status: Completed  Specimen: Blood  Updated: 08/05/20 0004     Specimen Description  . BLOOD     Special Requests  RT HAND 10ML     Culture  NO GROWTH 6 DAYS    Culture, Blood 1 [[id number]]   Collected: 07/30/20 0147    Order Status: Completed  Specimen: Blood  Updated: 08/05/20 0004     Specimen Description  . BLOOD     Special Requests  LT HAND 10ML     Culture  NO GROWTH 6 DAYS    Culture, Blood 1 [[id number]]   Collected: 07/27/20 2028    Order Status: Completed  Specimen: Blood  Updated: 08/02/20 0459     Specimen Description  . BLOOD     Special Requests  NOT REPORTED     Culture  NO GROWTH 6 DAYS    Culture, Blood 1 [[id number]]   Collected: 07/27/20 2030    Order Status: Completed  Specimen: Blood  Updated: 08/02/20 0459     Specimen Description  . BLOOD     Special Requests  NOT REPORTED     Culture  NO GROWTH 6 DAYS        Culture, Blood 1 [[id number]]   Collected: 07/27/20 2028    Order Status: Completed  Specimen: Blood  Updated: 08/02/20 0459     Specimen Description  . BLOOD     Special Requests  NOT REPORTED     Culture  NO GROWTH 6 DAYS Culture, Blood 1 [[id number]]   Collected: 07/27/20 2030    Order Status: Completed  Specimen: Blood  Updated: 08/02/20 0459     Specimen Description  . BLOOD     Special Requests  NOT REPORTED     Culture  NO GROWTH 6 DAYS      Gastrointestinal Panel, Molecular [[id number]]   Collected: 07/28/20 1220    Order Status: Completed  Specimen: Stool  Updated: 07/29/20 1542     Specimen Description  . FECES     Campylobacter PCR  NEGATIVE: No Campylobacter spp. (jejuni or coli) DNA Detected     Salmonella PCR  NEGATIVE: No Salmonella spp. DNA Detected     Shigatoxin Gene PCR  NEGATIVE: No Shiga toxin-producing gene(s) Detected     Shigella Sp PCR  NEGATIVE: No Shigella spp. / EIEC DNA Detected     Plesiomonas Shigelloides PCR  NEGATIVE: No Plesionomas shigelloides DNA Detected     Vibrio PCR  NEGATIVE: No Vibrio (V. vulnificus, V, parahaemolyticus and V. cholerae) DNA Detected     E Coli Enterotoxigenic PCR  NEGATIVE: No Enterotoxigenic E. coli (ETEC) Heat-labile and heat-stable (LT/ST) DNA Detected     Yersinia Enterocolitica PCR  NEGATIVE: No Yersinia enterocolitica DNA Detected    C DIFF TOXIN/ANTIGEN [[id number]]   Collected: 07/28/20 1220    Order Status: Completed  Specimen: Stool  Updated: 07/29/20 0850     Specimen Description  . FECES     C DIFF AG + TOXIN  NEGATIVE     Comment:  No C. difficile antigen and Toxin Detected. Culture, Urine [[id number]]   Collected: 07/27/20 2154    Order Status: Completed  Specimen: Urine, clean catch  Updated: 07/28/20 2112     Specimen Description  . CLEAN CATCH URINE     Special Requests  NOT REPORTED     Culture  NO SIGNIFICANT GROWTH        Medications:      hydrocortisone sodium succinate PF  25 mg Intravenous Q8H    aspirin  81 mg Oral Daily    famotidine  20 mg Oral Daily    insulin lispro  0-12 Units Subcutaneous Q6H    insulin glargine  10 Units Subcutaneous Nightly    levothyroxine  50 mcg Intravenous Daily    And    sodium chloride (PF)  5 mL Intravenous Daily  sodium chloride flush  10 mL Intravenous 2 times per day    sodium chloride flush  10 mL Intravenous 2 times per day    atorvastatin  40 mg Oral Nightly           [facility]  Vaishali Peck MD  [employer]  OFFICE: [phone]      Electronically signed by Vaishali Peck MD on 8/5/2020 at 9:41 AM  Thank you for allowing us to participate in the care of this patient. Please call with questions. This note iscreated with the assistance of a speech recognition program.  While intending to generate a document that actually reflects the content of the visit, the document can still have some errors including those of syntax andsound a like substitutions which may escape proof reading. In such instances, actual meaning can be extrapolated by contextual diversion.

## 2020-08-05 NOTE — PROGRESS NOTES
NEUROLOGY INPATIENT PROGRESS NOTE    8/5/2020         Current Exam:     Chart reviewed. Discussed with RN. No change in patient condition. He continues to have pupil reactivity but has absent cough, gag. Does not breathe over the vent. There is no withdrawal and no spontaneous movement per RN. Brief History:    Lauryn Oakley is a  60 y.o. male with H/O pituitary tumor resection (2016), DM, who was admitted on 7/27/2020 after being found unresponsive at work; EMS found him febrile at [address]. On arrival he was lethargic with slurred speech and RLE weakness. CT head negative, CTA head and neck unremarkable. He was loaded with IV Keppra 1 gram and continued on maintenance dosing 500mg BID. EEG with moderate to severe encephalopathy with near continuous frontally dominant rhythmic delta activity concerning for subclinical status; Keppra maintenance dose was increased to 1 gram twice daily. He was found to be COVID + and was transferred to [facility] unit. On 7/29 patient had a PEA arrest undergoing 2 rounds of CPR; he reportedly coded two more times, was intubated and transferred to [facility] ICU where he has another PEA arrest. During one code, his blood sugar was found to be 11. Patient was started on LTME with diffuse slowing. No current facility-administered medications on file prior to encounter. No current outpatient medications on file prior to encounter. Allergies: Lauryn Oakley has No Known Allergies. Past Medical History:   Diagnosis Date    Depression     ED (erectile dysfunction)     Hypothyroid     Migraine     Pituitary tumor     Type 2 diabetes mellitus ([facility] [address]     resolved with 50# weight loss       Past Surgical History:   Procedure Laterality Date    PITUITARY EXCISION  10/05/2016       Social History: Lauryn Oakley  reports that he has never smoked. He has never used smokeless tobacco. He reports that he does not drink alcohol. History reviewed.  No pertinent family history. Objective:   /71   Pulse 66   Temp 96.1 °F (35.6 °C) (Esophageal)   Resp 13   Ht 5' 10\" (1.778 m)   Wt 165 lb 4.8 oz (75 kg)   SpO2 100%   BMI 23.72 kg/m²     Blood pressure range: Systolic (18NQK), XFB:488 , Min:125 , OXW:268   ; Diastolic (95MVR), WEM:78, Min:62, Max:78      Review of Systems:  Cannot perform due to patient condition                                            Due to the current efforts to prevent transmission of COVID-19 and also the need to preserve PPE for other caregivers, a face-to-face encounter with the patient was not performed. That being said, all relevant records and diagnostic tests were reviewed, including laboratory results and imaging. Discussed with RN over the phone. Patient remains intubated, off of sedation. RN reports there is absent cough, absent gag. He is not breathing over the vent today. He does have reactive pupils bilaterally. No withdrawal to pain and no spontaneous movement. Data:    Lab Results:   CBC:   Recent Labs     08/03/20  0534 08/04/20  0420 08/05/20  0409   WBC 13.4* 15.0* 19.5*   HGB 7.2* 7.2* 7.4*   PLT 79* 151 226     BMP:    Recent Labs     08/03/20  0534  08/03/20  1709 08/04/20  0420 08/05/20  0409      < > 140 138 142   K 5.0   < > 5.1 5.0 5.0   CL 97*   < > 100 98 98   CO2 16*   < > 16* 15* 15*   *  --   --  172* 204*   CREATININE 9.08*  --   --  9.98* 11.76*   GLUCOSE 122*  --   --  150* 92    < > = values in this interval not displayed.          Lab Results   Component Value Date    CHOL 150 07/28/2020    LDLCHOLESTEROL 100 07/28/2020    HDL 24 (L) 07/28/2020    TRIG 131 07/28/2020    ALT 27 07/28/2020    AST 44 (H) 07/28/2020    TSH <0.01 (L) 07/28/2020    INR 1.0 07/28/2020    LABA1C 5.3 07/28/2020           Diagnostic data reviewed:  CT HEAD (7/27/2020): No acute intracranial abnormality      MRI BRAIN (7/31/2020):   Evidence of diffusely increased diffusion signal throughout the cortex;    hypoxic ischemic injury in the appropriate clinical setting.      CTA HEAD & NECK (7/27/2020): Unremarkable        LTME (7/28 - 7/29/2020):  Day 1: During this day of recording no events were recorded. The interictal EEG was abnormal due to diffuse polymorphic delta theta slowing suggesting moderate to severe encephalopathy.  Near continuous frontally dominant rhythmic delta activity appeared to be on ictal-interictal spectrum and was concerning for subclinical seizures     Day 2: During this day of recording no events were recorded. The interictal EEG was abnormal due to diffuse polymorphic delta theta slowing suggesting moderate to severe encephalopathy.  Near continuous frontally dominant rhythmic delta activity appeared to be on ictal-interictal spectrum and was concerning for subclinical seizures. The EEG was essentially unchanged from yesterday       EEG (7/31/2020): This EEG was severely abnormal.  No clear discernible cerebral activity was seen.  This finding is suggestive of diffuse cortical dysfunction or can be from sedation medication effect        REPEAT LTME (7/31-  No events were recorded. The interictal EEG was severely abnormal due to attenuated background activity which suggested diffuse cortical dysfunction. Impression:  -Anoxic encephalopathy s/p multiple episodes of cardiac arrest  -New onset A fib post arrest  -COVID-19  -LEILA    Plan:  -The patient continues off of sedation and has no purposeful movement with absence of most brainstem reflexes. MRI brain with evidence of diffuse hypoxic ischemic injury and EEG with severe diffuse encephalopathy with very poor prognosis. Chance of meaningful neurologic recovery felt to be quite unlikely. This has been discussed with daughter by attending neurologist  -Ethics is following  -We will follow    Please note that this note was generated using a voice recognition dictation software.  Although every effort was made to ensure the accuracy of this automated transcription, some errors in transcription may have occurred.

## 2020-08-05 NOTE — PROGRESS NOTES
Progress Note    Patient - Sheryle Re  Date of Admission -  2020  8:06 PM  Date of Evaluation -  2020  Room and Bed Number -  7015/3580-66   Hospital Day - 9    CHIEF COMPLAINT : covid -19 pneumonia    SUBJECTIVE:     OVERNIGHT EVENTS:         No clinical change. Remains unresponsive. Inconsistent  withdrawal to noxious stimuli. Breathes above the ventilator. EEG shows background suppression pattern with occasional low amplitude delta. Consistent with severe anoxic injury. Much family discord regarding limiting continued treatment and withdrawal of support in the context of severe, irreversible brain injury.   AWAKE & FOLLOWING COMMANDS:  [x] No   [] Yes    SECRETIONS Amount:  [x] Small [] Moderate  [] Large  [] None  Color:     [x] White [] Colored  [] Bloody    SEDATION:  RAAS Score:  [] Propofol gtt  [] Versed gtt  [] Ativan gtt   [x] No Sedation    PARALYZED:  [x] No    [] Yes    VASOPRESSORS:  [x] No    [] Yes  [] Levophed [] Dopamine [] Vasopressin  [] Dobutamine [] Phenylephrine [] Epinephrine      OBJECTIVE:     VITAL SIGNS:  /71   Pulse 66   Temp 96.1 °F (35.6 °C) (Esophageal)   Resp 18   Ht 5' 10\" (1.778 m)   Wt 165 lb 4.8 oz (75 kg)   SpO2 98%   BMI 23.72 kg/m²   Tmax over 24 hours:  Temp (24hrs), Av.6 °F (35.9 °C), Min:96.1 °F (35.6 °C), Max:97 °F (36.1 °C)      Patient Vitals for the past 8 hrs:   Pulse Resp SpO2   20 1453 -- 18 98 %   20 0856 66 13 100 %         Intake/Output Summary (Last 24 hours) at 2020 1609  Last data filed at 2020 0546  Gross per 24 hour   Intake 164 ml   Output 375 ml   Net -211 ml     Date 20 0000 - 20 2359   Shift 3747-5375 4741-3901 9814-6303 24 Hour Total   INTAKE   I.V.(mL/kg) 63(0.8)   63(0.8)   NG/GT(mL/kg) 78(1)   78(1)   Shift Total(mL/kg) 141(1.9)   141(1.9)   OUTPUT   Urine(mL/kg/hr) 300(0.5)   300   Shift Total(mL/kg) 300(4)   300(4)   Weight (kg) 75 75 75 75     Wt Readings from Last 3 Encounters: 08/05/20 165 lb 4.8 oz (75 kg)     Body mass index is 23.72 kg/m².         PHYSICAL EXAM:  Unresponsive       MEDICATIONS:  Scheduled Meds:   hydrocortisone sodium succinate PF  25 mg Intravenous Q8H    aspirin  81 mg Oral Daily    famotidine  20 mg Oral Daily    insulin lispro  0-12 Units Subcutaneous Q6H    insulin glargine  10 Units Subcutaneous Nightly    levothyroxine  50 mcg Intravenous Daily    And    sodium chloride (PF)  5 mL Intravenous Daily    sodium chloride flush  10 mL Intravenous 2 times per day    sodium chloride flush  10 mL Intravenous 2 times per day    atorvastatin  40 mg Oral Nightly     Continuous Infusions:   furosemide (LASIX) 5mg/ml infusion 40 mg/hr (08/04/20 2016)    dextrose       PRN Meds:   artificial tears, , PRN  metoprolol, 5 mg, Q4H PRN  metoprolol, 2.5 mg, Q6H PRN  potassium chloride, 20 mEq, PRN  sodium chloride flush, 10 mL, PRN  acetaminophen, 650 mg, Q6H PRN    Or  acetaminophen, 650 mg, Q6H PRN  polyethylene glycol, 17 g, Daily PRN  promethazine, 12.5 mg, Q6H PRN    Or  ondansetron, 4 mg, Q6H PRN  nicotine, 1 patch, Daily PRN  LORazepam, 2 mg, Q4H PRN  diphenhydrAMINE, 25 mg, Q6H PRN  glucose, 15 g, PRN  dextrose, 12.5 g, PRN  glucagon (rDNA), 1 mg, PRN  dextrose, 100 mL/hr, PRN        SUPPORT DEVICES: [x] Ventilator [] BIPAP  [] Nasal Cannula [] Room Air    VENT SETTINGS (Comprehensive) (if applicable):    Vent Information  $Ventilation: $Subsequent Day  Skin Assessment: Clean, dry, & intact  Suction Catheter Diameter: 14  Equipment ID: [id number]  Equipment Changed: HME  Vent Type: Servo i  Vent Mode: PRVC  Vt Ordered: 530 mL  Rate Set: 14 bmp  Pressure Support: 6 cmH20  FiO2 : 30 %  SpO2: 98 %  SpO2/FiO2 ratio: 326.67  Sensitivity: 2  Flow by: 2  PEEP/CPAP: 5  I Time/ I Time %: 0.9 s  Humidification Source: HME  Nitric Oxide/Epoprostenol In Use?: No  Additional Respiratory  Assessments  Pulse: 66  Resp: 18  SpO2: 98 %  End Tidal CO2: 27 (%)  Position: Semi-[place]'s  Humidification Source: HME  Oral Care Completed?: Yes  Oral Care: Mouth swabbed, Mouth moisturizer, Mouth suctioned  Subglottic Suction Done?: Yes  Cuff Pressure (cm H2O): (MLT)  Skin barrier applied: No    ABGs:     Lab Results   Component Value Date    YOE7SZD 19 08/05/2020    FIO2 30.0 08/05/2020       DATA:  Complete Blood Count:   Recent Labs     08/03/20  0534 08/04/20  0420 08/05/20  0409   WBC 13.4* 15.0* 19.5*   RBC 2.60* 2.55* 2.54*   HGB 7.2* 7.2* 7.4*   HCT 22.2* 21.8* 22.1*   MCV 85.4 85.5 87.0   MCH 27.7 28.2 29.1   MCHC 32.4 33.0 33.5   RDW 15.1* 14.8* 15.0*   PLT 79* 151 226   MPV 12.6 11.6 10.7        Last 3 Blood Glucose:   Recent Labs     08/03/20  0534 08/04/20  0420 08/05/20  0409   GLUCOSE 122* 150* 92        PT/INR:    Lab Results   Component Value Date    PROTIME 10.9 07/28/2020    INR 1.0 07/28/2020     PTT:    Lab Results   Component Value Date    APTT 24.9 08/05/2020       Comprehensive Metabolic Profile:   Recent Labs     08/03/20  0534  08/03/20  1709 08/04/20  0420 08/05/20  0409      < > 140 138 142   K 5.0   < > 5.1 5.0 5.0   CL 97*   < > 100 98 98   CO2 16*   < > 16* 15* 15*   *  --   --  172* 204*   CREATININE 9.08*  --   --  9.98* 11.76*   GLUCOSE 122*  --   --  150* 92   CALCIUM 7.0*  --   --  6.7* 6.9*    < > = values in this interval not displayed.       Magnesium:   Lab Results   Component Value Date    MG 1.4 07/31/2020    MG 1.5 07/30/2020    MG 1.7 07/30/2020     Phosphorus:   Lab Results   Component Value Date    PHOS 2.3 07/30/2020     Ionized Calcium: No results found for: CAION     Urinalysis:   Lab Results   Component Value Date    NITRU NEGATIVE 07/27/2020    COLORU YELLOW 07/27/2020    PHUR 5.0 07/27/2020    WBCUA None 07/27/2020    RBCUA 0 TO 2 07/27/2020    MUCUS NOT REPORTED 07/27/2020    TRICHOMONAS NOT REPORTED 07/27/2020    YEAST NOT REPORTED 07/27/2020    BACTERIA NOT REPORTED 07/27/2020    SPECGRAV 1.052 07/27/2020    LEUKOCYTESUR High anion gap metabolic acidosis    New onset atrial fibrillation (HCC)    Pulseless electrical activity (HCC)  Resolved Problems:    * No resolved hospital problems. *       PLAN:   1. Current condition mostly related to severe anoxic brain injury in the context of multiple PEA arrests complicated by [id number] virus infection. 2. Continue ventilator support. 3. Agree with recommendations for palliative care and withdrawal of support. 4. Try to reach family consensus. Hemodialysis non-beneficial care in this context and not indicated. 5. Discussed with Dr. Kendell Stone.    Electronically signed by Tanya Johnson DO on 8/5/2020 at 4:09 PM   Crit Care time 30min

## 2020-08-05 NOTE — PROGRESS NOTES
(erectile dysfunction), Hypothyroid, Migraine, Pituitary tumor, and Type 2 diabetes mellitus ([address] Social History:   reports that he has never smoked. He has never used smokeless tobacco. He reports that he does not drink alcohol. Family History: History reviewed. No pertinent family history. Vitals:  /71   Pulse 66   Temp 96.1 °F (35.6 °C) (Esophageal)   Resp 18   Ht 5' 10\" (1.778 m)   Wt 165 lb 4.8 oz (75 kg)   SpO2 98%   BMI 23.72 kg/m²   Temp (24hrs), Av.6 °F (35.9 °C), Min:96.1 °F (35.6 °C), Max:97 °F (36.1 °C)    Recent Labs     20  0355 20  1149 20  0405   POCGLU 145* 140* 125* 95       I/O (24Hr): Intake/Output Summary (Last 24 hours) at 2020 1533  Last data filed at 2020 0546  Gross per 24 hour   Intake 164 ml   Output 375 ml   Net -211 ml       Labs:  Hematology:  Recent Labs     20  0534 20  04220  0409   WBC 13.4* 15.0* 19.5*   RBC 2.60* 2.55* 2.54*   HGB 7.2* 7.2* 7.4*   HCT 22.2* 21.8* 22.1*   MCV 85.4 85.5 87.0   MCH 27.7 28.2 29.1   MCHC 32.4 33.0 33.5   RDW 15.1* 14.8* 15.0*   PLT 79* 151 226   MPV 12.6 11.6 10.7   DDIMER 7.55 4.06 4.86     Chemistry:  Recent Labs     20  0534  20  17020  0420 20  0409      < > 140 138 142   K 5.0   < > 5.1 5.0 5.0   CL 97*   < > 100 98 98   CO2 16*   < > 16* 15* 15*   GLUCOSE 122*  --   --  150* 92   *  --   --  172* 204*   CREATININE 9.08*  --   --  9.98* 11.76*   ANIONGAP 24*   < > 24* 25* 29*   LABGLOM 6*  --   --  5* 4*   GFRAA 7*  --   --  6* 5*   CALCIUM 7.0*  --   --  6.7* 6.9*    < > = values in this interval not displayed.      Recent Labs     20  1219 20  0325 20  0355 20  1149 20  2000 20  0405   POCGLU 133* 153* 145* 140* 125* 95     ABG:  Lab Results   Component Value Date    POCPH 7.363 2020    POCPCO2 31.7 2020    POCPO2 66.9 2020    POCHCO3 18.0 2020    NBEA 7 08/05/2020    PBEA NOT REPORTED 08/05/2020    HQW9WTL 19 08/05/2020    TRAZ8CGU 93 08/05/2020    FIO2 30.0 08/05/2020     Lab Results   Component Value Date/Time    SPECIAL RT HAND 10ML 07/30/2020 01:57 AM     Lab Results   Component Value Date/Time    CULTURE NO GROWTH 6 DAYS 07/30/2020 01:57 AM       Radiology:  Lucien Speedy Chest (single View Frontal)    Result Date: 7/30/2020  Satisfactory position of the lines and catheters. Cardiomegaly with perihilar edema. Ct Head Wo Contrast    Result Date: 7/27/2020  No acute intracranial abnormality. Findings were communicated electronically to Dr. Catalina Warner through the radiology results communication center. Us Renal Complete    Result Date: 7/30/2020  Increased cortical echogenicity consistent with medical renal disease. No hydronephrosis or nephrolithiasis. Increased fluid in the bowel. Xr Chest Portable    Result Date: 7/29/2020  Right IJ central line appears in satisfactory position without evidence of pneumothorax. Endotracheal tube also appears in satisfactory position. Ct Chest Pulmonary Embolism W Contrast    Result Date: 7/27/2020  No evidence of pulmonary embolism or acute pulmonary abnormality. Cta Head Neck W Contrast    Result Date: 7/27/2020  No large vessel occlusion or hemodynamic stenosis Multilevel degenerate changes cervical spine. Physical Examination:        Physical exam was deferred today secondary to COVID-19 pandemic and need for conserving PPE.     Assessment:        Hospital Problems           Last Modified POA    * (Principal) COVID-19 8/2/2020 Yes    Toxic metabolic encephalopathy 8/81/1748 Yes    Secondary hypothyroidism 7/29/2020 Yes    History of pituitary tumor 7/28/2020 Yes    Seizure ([address] 7/28/2020 Yes    Somnolence 7/28/2020 Yes    Shock circulatory ([address] 7/29/2020 Yes    Pituitary insufficiency ([address] 7/29/2020 Yes    High anion gap metabolic acidosis 1/61/6158 Yes    New onset atrial fibrillation ([address] 7/29/2020 Yes conversation with me today. Will continue current treatment.     33 Edwina Davenport DO  8/5/2020  3:33 PM

## 2020-08-05 NOTE — PLAN OF CARE
Problem: Airway Clearance - Ineffective  Goal: Achieve or maintain patent airway  8/5/2020 0801 by Brennen Dominguez, RCP  Outcome: Ongoing  8/4/2020 1950 by Mani Conn RN  Outcome: Ongoing     Problem: Gas Exchange - Impaired  Goal: Absence of hypoxia  8/5/2020 0801 by Brennen Dominguez, RCP  Outcome: Ongoing  8/4/2020 1950 by Mani Conn RN  Outcome: Ongoing  Goal: Promote optimal lung function  8/5/2020 0801 by Brennen Dominguez, RCP  Outcome: Ongoing  8/4/2020 1950 by Mani Conn RN  Outcome: Ongoing     Problem: Breathing Pattern - Ineffective  Goal: Ability to achieve and maintain a regular respiratory rate  8/5/2020 0801 by Brennen Dominguez, GAYATHRIP  Outcome: Ongoing  8/4/2020 1950 by Mani Conn RN  Outcome: Ongoing     Problem: OXYGENATION/RESPIRATORY FUNCTION  Goal: Patient will maintain patent airway  8/5/2020 0801 by Brennen Dominguez, RCP  Outcome: Ongoing  8/4/2020 2209 by Yudy Andre RCP  Outcome: Ongoing  8/4/2020 2111 by Yudy Andre RCP  Outcome: Ongoing  8/4/2020 1950 by Mani Conn RN  Outcome: Ongoing  Goal: Patient will achieve/maintain normal respiratory rate/effort  Description: Respiratory rate and effort will be within normal limits for the patient  8/5/2020 0801 by Brennen Dominguez, RCP  Outcome: Ongoing  8/4/2020 2209 by Yudy Andre RCP  Outcome: Ongoing  8/4/2020 2111 by Yudy Andre RCP  Outcome: Ongoing  8/4/2020 1950 by Mani Conn RN  Outcome: Ongoing     Problem: MECHANICAL VENTILATION  Goal: Mobility/activity is maintained at optimum level for patient  8/5/2020 0801 by Brennen Dominguez, RCP  Outcome: Ongoing  8/4/2020 2209 by Yudy Andre RCP  Outcome: Ongoing  8/4/2020 2111 by Yudy Andre RCP  Outcome: Ongoing  8/4/2020 1950 by Mani Conn RN  Outcome: Ongoing  Goal: Ability to express needs and understand communication  8/4/2020 2209 by Yudy Andre RCP  Outcome: Ongoing  8/4/2020 2111 by Yudy Andre RCP  Outcome: Ongoing  8/4/2020 1950 by

## 2020-08-05 NOTE — PLAN OF CARE
Problem: OXYGENATION/RESPIRATORY FUNCTION  Goal: Patient will maintain patent airway  8/4/2020 2111 by Kodi Reyes RCP  Outcome: Ongoing  Goal: Patient will achieve/maintain normal respiratory rate/effort  Description: Respiratory rate and effort will be within normal limits for the patient  8/4/2020 2111 by Kodi Reyes RCP  Outcome: Ongoing     Problem: MECHANICAL VENTILATION  Goal: Mobility/activity is maintained at optimum level for patient  8/4/2020 2111 by Kodi Reyes RCP  Outcome: Ongoing  Goal: Ability to express needs and understand communication  8/4/2020 2111 by Kodi Reyes RCP  Outcome: Ongoing  Goal: Patient will maintain patent airway  8/4/2020 2111 by Kodi Reyes RCP  Outcome: Ongoing  Goal: Patient will maintain patent airway  8/4/2020 2111 by Kodi Reyes RCP  Outcome: Ongoing  Goal: Oral health is maintained or improved  8/4/2020 2111 by Kodi Reyes RCP  Outcome: Ongoing  Goal: ET tube will be managed safely  8/4/2020 2111 by Kodi Reyes RCP  Outcome: Ongoing     Problem: SKIN INTEGRITY  Goal: Skin integrity is maintained or improved  8/4/2020 2111 by Kodi Reyes RCP  Outcome: Ongoing

## 2020-08-05 NOTE — PROGRESS NOTES
Occupational Therapy Not Seen Note    DATE: 2020  Name: Cristhian Lay  : 1960  MRN: [id number]    Patient not available for Occupational Therapy due to:     Other: Intubated/poor prognosis    Next Scheduled Treatment: 2020    Electronically signed by MARCO Eugene on 2020 at 11:35 AM

## 2020-08-06 NOTE — PROGRESS NOTES
At 0000, Pt. Started to get hypotensive, Dr. Maddi Olmedo was contacted and Albumin was given. Levophed was also ordered and started. Pt. Was able to be weaned off of levophed around 0200.  Was also notified re: hbg 6.8 this AM and 1 unit of RBC was ordered. Awaiting type and screen. Around [phone], pt began to be tachycardic in 150-170s. EKG was done and it showed Afib RVR. Amiodarone bolus and infusion were ordered. Will continue to monitor.

## 2020-08-06 NOTE — PROGRESS NOTES
Spoke with Dr Rex Castro about patient prognosis. He requested me to reach out to the family and discuss patient poor prognosis and code status. Patient does not have a DPOA and has 9 known adult children.     Attempted to call [place] and follow up with her on her decision for her Dad's code status and she asked if her dad was completely brain dead and I informed her that he had severe hypoxic injury to his brain and due to the brain not being able to regenerate that his condition would not improve. [place] requested to speak with doctor to ask questions. Dr Rex Castro notified and will contact her. Dr Rex Castro called [place] and discussed patient and she agreed to [facility]. Called each of patient known children separately and discussed the declining status of their dad and that Dr Rxe Castro wanted me to reach out to family and discuss [facility] comfort care status. The [facility] comfort care status would consist of removing the ET tube from the patient and providing him with comfort care medications to keep patient comfortable during end of life. Sourav Ghosh  daughter agreed for [facility] comfort care/extubation   Scott Otero daughter agreed for [facility] comfort care/extubation  Shelly Wells daughter agreed for [facility] comfort care/extubation   Jeff Moy son agreed for [facility] comfort care/extubation  Eros Vasques son agreed for [facility] comfort care/extubation   Sara Osman son agreed for [facility] comfort care/extubation      Malini Powerss son did not want to say/decide   [place] son did not agree with [facility] comfort care. He was very upset by call and was yelling and cursing at caller. Attempted to have Dr Rex Castro to call and Aleks Yanez but he did not answer. Daniela Langley son did not answer his phone. Patient Code status was changed to [facility] comfort care, orders placed for code status and comfort care medications. All calls to family was witnessed by Ilia Garcia RN and Nadira Chatman RN.     Patient family offered a Zoom call and Marija Murphy, Marissa Woodard, and Mrs. Shantel Jensen were interested their phone numbers provided to spiritual care for Zoom call at 4 pm today. Called Dr Olivia Calvin and Dr Cathy Machado and notified them of above. Total time spent on this conference call was more than 60 minutes.

## 2020-08-06 NOTE — PLAN OF CARE
Problem: Airway Clearance - Ineffective  Goal: Achieve or maintain patent airway  Outcome: Ongoing     Problem: Gas Exchange - Impaired  Goal: Absence of hypoxia  Outcome: Ongoing  Goal: Promote optimal lung function  Outcome: Ongoing     Problem: OXYGENATION/RESPIRATORY FUNCTION  Goal: Patient will maintain patent airway  Outcome: Ongoing  Goal: Patient will achieve/maintain normal respiratory rate/effort  Description: Respiratory rate and effort will be within normal limits for the patient  Outcome: Ongoing     Problem: MECHANICAL VENTILATION  Goal: Mobility/activity is maintained at optimum level for patient  Outcome: Ongoing  Goal: Ability to express needs and understand communication  Outcome: Ongoing  Goal: Patient will maintain patent airway  Outcome: Ongoing  Goal: Patient will maintain patent airway  Outcome: Ongoing  Goal: Oral health is maintained or improved  Outcome: Ongoing  Goal: ET tube will be managed safely  Outcome: Ongoing     Problem: SKIN INTEGRITY  Goal: Skin integrity is maintained or improved  Outcome: Ongoing

## 2020-08-06 NOTE — PLAN OF CARE
VENTILATION  Goal: Patient will maintain patent airway  8/5/2020 2106 by Marlon Calle RCP  Outcome: Ongoing  8/5/2020 0801 by Leonidas Treviño RCP  Outcome: Ongoing     Problem: MECHANICAL VENTILATION  Goal: Patient will maintain patent airway  8/5/2020 2106 by Marlon Calle RCP  Outcome: Ongoing  8/5/2020 0801 by Leonidas Treviño RCP  Outcome: Ongoing     Problem: MECHANICAL VENTILATION  Goal: Oral health is maintained or improved  8/5/2020 2106 by Marlon Calle RCP  Outcome: Ongoing  8/5/2020 0801 by Leonidas Treviño RCP  Outcome: Ongoing     Problem: MECHANICAL VENTILATION  Goal: ET tube will be managed safely  Outcome: Ongoing     Problem: SKIN INTEGRITY  Goal: Skin integrity is maintained or improved  8/5/2020 2106 by Marlon Calle RCP  Outcome: Ongoing  8/5/2020 0801 by Leonidas Treviño RCP  Outcome: Ongoing

## 2020-08-06 NOTE — PROGRESS NOTES
bottles of Proteinex 2 go/d ~> 1720 kcals, 120 gms protein   · Propofol off @ this time       Anthropometric Measures:  · Height: 5' 10\" (177.8 cm)  · Current Body Weight: 165 lb (74.8 kg)(unknown)   · Admission Body Weight: 165 lb (74.8 kg)    · Ideal Body Weight: 166 lbs; % Ideal Body Weight 99.4 %   · BMI: 23.7  · BMI Categories: Normal Weight (BMI 22.0 to 24.9) age over 72       Nutrition Diagnosis:   · Inadequate oral intake related to impaired respiratory funtion as evidenced by NPO or clear liquid status due to medical condition, intubation, nutrition support - enteral nutrition      Nutrition Interventions:   Food and/or Nutrient Delivery:  Continue NPO, Modify Tube Feeding  Nutrition Education/Counseling:  Education not indicated   Coordination of Nutrition Care:  Continued Inpatient Monitoring    Goals:  Meet % of estimated nutrtion needs       Nutrition Monitoring and Evaluation:   Food/Nutrient Intake Outcomes:  Enteral Nutrition Intake/Tolerance  Physical Signs/Symptoms Outcomes:  Biochemical Data, Nutrition Focused Physical Findings, Skin, Weight, GI Status     Discharge Planning:     Too soon to determine     Electronically signed by Marco A Diaz RD, LD on 8/6/20 at 12:04 PM EDT    Contact: [phone]

## 2020-08-06 NOTE — PROGRESS NOTES
Neurology Nurse Practitioner Progress Note      INTERVAL HISTORY: This is a 60 y.o.  male admitted 7/27/2020 for unresponsiveness. This is a follow-up neurology progress note. The chart was reviewed. Discussed with the RN. Today early morning pt got hypotensive; albumin and Levophed were started. Hemoglobin 6.8; pt to get blood transfusion. Later, he became tachycardic with HR in 150s-170s; A. fib RVR, amiodarone was started. Patient remains intubated, unresponsive, off of all sedation. Family's decision awaited. HPI: Zygmunt Kawasaki is a 60 y.o. male with significant H/O pituitary tumor resection (2016), medication noncompliance, DM, who was admitted 7/27/2020 for unresponsiveness. According to medical record, patient was working at a [employer] with no air conditioning and his coworkers found him unresponsive on the ground; EMS were called who found him to be febrile with [address]:  At arrival, he was awake but lethargic with some weakness of right leg and slurring; got evaluated by the stroke team with [facility] 4.  CT head was negative. CTA head and neck was unremarkable. He was loaded with Keppra 1 g IVPB x1 with initial plan to maintain at 500 mg twice a day. EEG showed moderate to severe encephalopathy with near continuous frontally dominant rhythmic delta activity appeared to be on ictal interictal spectrum and was concerning for subclinical seizures; Keppra maintenance dose was increased to 1 g IVPB twice a day after bolus of 500 mg was given. COVID was positive; patient was transferred to [facility] unit. On 7/29 patient had PEA arrest undergoing 2 rounds of CPR, in and out of A. fib requiring bolus of amiodarone before NSR. Reportedly, he coded 2 more times; got intubated and transferred to ICU where he had another PEA arrest.  During 1 of the codes, his blood sugar was noted to be 11. With code, LTME with diffuse slowing. LTME was D/C'd during that time.   Patient developed copious diarrhea; was started on Levophed for shock.  albumin human  50 g Intravenous BID    hydrocortisone sodium succinate PF  25 mg Intravenous Q8H    aspirin  81 mg Oral Daily    famotidine  20 mg Oral Daily    insulin lispro  0-12 Units Subcutaneous Q6H    insulin glargine  10 Units Subcutaneous Nightly    levothyroxine  50 mcg Intravenous Daily    And    sodium chloride (PF)  5 mL Intravenous Daily    sodium chloride flush  10 mL Intravenous 2 times per day    sodium chloride flush  10 mL Intravenous 2 times per day    atorvastatin  40 mg Oral Nightly       Past Medical History:   Diagnosis Date    Depression     ED (erectile dysfunction)     Hypothyroid     Migraine     Pituitary tumor     Type 2 diabetes mellitus ([facility] [address]     resolved with 50# weight loss       Past Surgical History:   Procedure Laterality Date    PITUITARY EXCISION  10/05/2016       PHYSICAL EXAM:      Blood pressure 128/61, pulse 93, temperature 98.4 °F (36.9 °C), temperature source Core, resp. rate 18, height 5' 10\" (1.778 m), weight 165 lb 4.8 oz (75 kg), SpO2 99 %. Due to the current efforts to prevent transmission of COVID-19 and also the need to preserve PPE for other caregivers, a face-to-face encounter with the patient was not performed. That being said, all relevant records and diagnostic tests were reviewed, including laboratory results and imaging. Patient was observed through the window. Please reference any relevant documentation elsewhere. Care was coordinated with the primary service.     Per RN, patient stays intubated, off of sedation and pressors  Stays unresponsive  Pupils - reactive  Corneal reflexes - absent  Gag/cough reflex - absent  No response at loud sound or painful stimuli  No limb withdrawal        DATA    Lab Results   Component Value Date    WBC 31.3 (HH) 08/06/2020    HGB 6.8 (LL) 08/06/2020    HCT 21.5 (L) 08/06/2020     08/06/2020    ALT 27 07/28/2020    AST 44 (H) 07/28/2020    NA 144 08/06/2020    K 5.5 (H) 08/06/2020    CL 99 08/06/2020    CREATININE 12.34 (HH) 08/06/2020     (HH) 08/06/2020    CO2 14 (L) 08/06/2020    TSH 0.01 (L) 08/06/2020    INR 1.0 07/28/2020    LABA1C 5.3 07/28/2020     Lab Results   Component Value Date    CHOL 150 07/28/2020     Lab Results   Component Value Date    TRIG 131 07/28/2020     Lab Results   Component Value Date    HDL 24 (L) 07/28/2020     Lab Results   Component Value Date    LDLCHOLESTEROL 100 07/28/2020     Lab Results   Component Value Date    VLDL NOT REPORTED 07/28/2020     Lab Results   Component Value Date    CHOLHDLRATIO 6.3 (H) 07/28/2020         DIAGNOSTIC DATA:  CT HEAD (7/27/2020): No acute intracranial abnormality     MRI BRAIN (7/31/2020):   Evidence of diffusely increased diffusion signal throughout the cortex;    hypoxic ischemic injury in the appropriate clinical setting. CTA HEAD & NECK (7/27/2020): Unremarkable      LTME (7/28 - 7/29/2020):  Day 1: During this day of recording no events were recorded. The interictal EEG was abnormal due to diffuse polymorphic delta theta slowing suggesting moderate to severe encephalopathy. Near continuous frontally dominant rhythmic delta activity appeared to be on ictal-interictal spectrum and was concerning for subclinical seizures    Day 2: During this day of recording no events were recorded. The interictal EEG was abnormal due to diffuse polymorphic delta theta slowing suggesting moderate to severe encephalopathy. Near continuous frontally dominant rhythmic delta activity appeared to be on ictal-interictal spectrum and was concerning for subclinical seizures. The EEG was essentially unchanged from yesterday      EEG (7/31/2020): This EEG was severely abnormal.  No clear discernible cerebral activity was seen. This finding is suggestive of diffuse cortical dysfunction or can be from sedation medication effect      REPEAT LTME (7/31- 8/4/2020): No events were recorded.  The

## 2020-08-06 NOTE — PROGRESS NOTES
Jaquelin Cesar 18    Progress Note    8/6/2020    10:00 AM    Name:   Juliocesar Porras  MRN:     [id number]     [place]:      [de-identified]   Room:   [address] [place] [address] Day:  8  Admit Date:  7/27/2020  8:06 PM    PCP:   No primary care provider on file. Code Status:  DNR-CCA    Subjective:     C/C:   Chief Complaint   Patient presents with    Altered Mental Status     Interval History Status: unchanged    Patient seen and examined this morning. Overnight, patient went into A-fib RVR with hypotension. Albumin was given. Amiodarone infusion was started. Patient on levophed for a brief period of time. Remains unresponsive. He is not withdrawing to noxious stimuli. Remains on Lasix drip. Creatinine continues to worsen. Blood pressure has improved. Brief History:     Juliocesar Porras is 60 y.o. male who was admitted to the hospital on 7/27/2020 for treatment of COVID-19 virus infection. Patient was brought to emergency room with altered mental status. He was at work and was found on the ground several hours later. Patient was confused, drowsy and not answering questions. He did not have any focal weakness. Evaluation in ER showed temperature 102. Lab evaluation showed low cortisol level, low TSH. COVID-19 test was positive. CT chest was negative for PE and did not show any Pneumonia. Neurology was consulted for possible TIA Vs syncope. CT head was negative for acute IC abnormality. Patient has history of pituitary adenoma S/P resection in 10/2016 at [facility] clinic by Dr. Alejandro Medrano. Patient had failed to follow-up with the clinic. Patient follows with Dr. Ry Dumont MD.  It appears from outpatient note with PCP on 5/13/2020 that patient has not been taking his medications.   He has not been following his endocrinologist.  He has chronic pituitary insufficiency and was supposed to be on hydrocortisone, desmopressin, levothyroxine, testosterone and diabetic medications. He has lost 50 pounds and had stopped all medications. Patient was found to have  decrease cortisol and TSH levels. Patient was lethargic and confused. He had PEA arrest on 7/29/2020 with successful ROSC after couple rounds of CPR followed by new onset A. fib. Patient was given amiodarone 150 mg dose and converted normal sinus rhythm. He had more episodes of multiple PEA arrest successfully resuscitated with CPR. Patient had circulatory shock and needed intubation, pressor support with Levophed, epinephrine. Patient has severe anion gap metabolic acidosis  treated with bicarb bolus and infusion. Patient was started on IV Solu-Cortef, IV levothyroxine for pituitary insufficiency. Levophed was gradually weaned blood pressure improved. Review of Systems:     Unable to obtain as patient is intubated and unresponsive off of all sedation. Medications:      Allergies:  No Known Allergies    Current Meds:   Scheduled Meds:    albumin human  50 g Intravenous BID    hydrocortisone sodium succinate PF  25 mg Intravenous Q8H    aspirin  81 mg Oral Daily    famotidine  20 mg Oral Daily    insulin lispro  0-12 Units Subcutaneous Q6H    insulin glargine  10 Units Subcutaneous Nightly    levothyroxine  50 mcg Intravenous Daily    And    sodium chloride (PF)  5 mL Intravenous Daily    sodium chloride flush  10 mL Intravenous 2 times per day    sodium chloride flush  10 mL Intravenous 2 times per day    atorvastatin  40 mg Oral Nightly     Continuous Infusions:    norepinephrine Stopped (08/06/20 0151)    amiodarone 1 mg/min (08/06/20 0654)    Followed by   [address] [place] [address] amiodarone      furosemide (LASIX) 5mg/ml infusion 40 mg/hr (08/05/20 2123)    dextrose       PRN Meds: artificial tears, metoprolol, metoprolol, potassium chloride, sodium chloride flush, acetaminophen **OR** acetaminophen, polyethylene glycol, promethazine **OR** ondansetron, nicotine, LORazepam, diphenhydrAMINE, glucose, 08/05/2020    NBEA 7 08/05/2020    PBEA NOT REPORTED 08/05/2020    JIX9CLS 19 08/05/2020    ASNA6DRS 93 08/05/2020    FIO2 30.0 08/05/2020     Lab Results   Component Value Date/Time    SPECIAL RT HAND 10ML 07/30/2020 01:57 AM     Lab Results   Component Value Date/Time    CULTURE NO GROWTH 6 DAYS 07/30/2020 01:57 AM       Radiology:  Ashlie Gross Chest (single View Frontal)    Result Date: 7/30/2020  Satisfactory position of the lines and catheters. Cardiomegaly with perihilar edema. Ct Head Wo Contrast    Result Date: 7/27/2020  No acute intracranial abnormality. Findings were communicated electronically to Dr. Stella Zuniga through the radiology results communication center. Us Renal Complete    Result Date: 7/30/2020  Increased cortical echogenicity consistent with medical renal disease. No hydronephrosis or nephrolithiasis. Increased fluid in the bowel. Xr Chest Portable    Result Date: 7/29/2020  Right IJ central line appears in satisfactory position without evidence of pneumothorax. Endotracheal tube also appears in satisfactory position. Ct Chest Pulmonary Embolism W Contrast    Result Date: 7/27/2020  No evidence of pulmonary embolism or acute pulmonary abnormality. Cta Head Neck W Contrast    Result Date: 7/27/2020  No large vessel occlusion or hemodynamic stenosis Multilevel degenerate changes cervical spine. Physical Examination:        General: Intubated, off of all sedation, nonresponsive  HENT: ET tube in place, bleeding noted from patient's lips with dried blood  Cardiac: Irregular rhythm, controlled rate, no murmur  Pulmonary: mechanical heart sounds, no wheezing  GI: bowel sounds diminished, protuberant abdomen  : dixon present  Extremities/skin: no rashes appreciated, right brachial art line  Neuro:  Does not withdraw to any noxious stimuli in all 4 extremities. He is currently breathing over the ventilator.     Assessment:        Hospital Problems           Last Modified POA    * receiving PRBC transfusion today    Hyponatremia: Resolved    Anion gap metabolic acidosis: Likely secondary to his acute kidney injury needed bicarb infusion    Pituitary cancer? Status post resection in 2016    DM2: Continue diabetes home meds, insulin sliding scale, hypoglycemia protocol, will follow    Adrenal insufficiency - continue current treatment with cortef    Continue tube feeds    Thrombocytopenia: DVT prophylaxis with EPC. Hold off on chemical anticoagulation in light of renal failure and potential risk of hyperkalemia, especially if we are attempting to avoid HD. If patient's family wishes to proceed with hemodialysis, will start patient on heparin subcutaneous 5000 units every 8 hours today    Maintain DNR. Discussed with patient's daughter this morning. She understands of the prognosis is grave. She is very pleasant on the phone. She is going to discuss with her siblings today about withdrawal of care. Again, I explained that the prognosis is grave.       Lan Mabry,   8/6/2020  10:00 AM

## 2020-08-06 NOTE — PROGRESS NOTES
[facility]  Progress Note    Kirk Mendoza  MRN: [id number]  Date: 8/6/2020    Reason for F/U :   COVID-19 virus infection    Impression :   1. Cardiopulmonary arrest-multiple status post successful resuscitation  2. Anoxic brain injury  3. Acute hypoxic respiratory failure currently ventilator dependent  4. Shock-resolved  · off pressors   5. New onset seizure  6. COVID-19 virus infection without evidence of pneumonia  7. History of pituitary tumor status post resection 2016  8. Acute kidney injury-worsening  9. Hypothyroidism  10. Diabetes mellitus type 2  11. Diarrhea  12. Anasarca  13. Atrial fibrillation with rapid ventricular response  14. Anemia    Recommendations:   · The patient continues off systemic antimicrobial therapy. · The COVID-19 infection does not require treatment. · CODE STATUS is DNR CC arrest.  · The patient continues to have significant neurological dysfunction  · The patient was started on amiodarone for the A. fib with RVR and is getting PRBC transfusions. · He remains off sedation and while he does move spontaneously again the chances of meaningful recovery are very poor  · The white blood cell count today did increase significantly and he has not been febrile so we will continue to monitor the progress   · The primary service and the palliative care team continue to reach out to the children    Infection Control Recommendations:   COVID-19 virus infection    Discharge Planning:   Patient will need Midline Catheter Insertion/ PICC line Insertion: No  Patient willneed outpatient wound care: No    MedicalDecision making / Summary of Stay:   Kirk Mendoza is a 60y.o.-year-old male who was initially admitted on 7/27/2020. Harish Rausch is seen and evaluated at bedside and the chart was reviewed. He is a poor historian and I was not able to get any history from him. 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   He was last known to be well at about 2 PM when a work colleague checked in on him but he was subsequently found down unresponsive for an unknown period of time. No witnessed seizures. EMS was called and temperature was 102 them on arrival in the emergency department was 100.7. The patient also lost control of bowel and bladder and was noted to have tongue biting. COVID-19 virus testing was positive and the patient was admitted to the [facility] unit. The patient does have a history of a pituitary tumor which was resected 2 years ago. The patient is suspected to have had a seizure and I was asked to evaluate for the COVID-19 infection. The patient does not recall the events of what happened and does not able to get much out of him today. The EEG showed diffuse polymorphic delta theta slowing suggestive of moderate to severe encephalopathy and frontal dominant rhythm delta activity concerning for subclinical seizure. On 2020 the patient had clinical deterioration with a PEA arrest and after successful resuscitation he had subsequent PEA and asystolic arrest requiring ACLS protocol. The patient was intubated started on Levophed for blood pressure support and was transferred to the intensive care unit    The patient has minimal neurological response off sedation/ significant neurological dysfunction    The MRI seems to show some anoxic brain injury. Current evaluation:2020    BP (!) 116/59   Pulse 94   Temp 98.4 °F (36.9 °C) (Core)   Resp 19   Ht 5' 10\" (1.778 m)   Wt 165 lb 4.8 oz (75 kg)   SpO2 99%   BMI 23.72 kg/m²     Temperature Range: Temp: 98.4 °F (36.9 °C) Temp  Av.2 °F (36.8 °C)  Min: 95.7 °F (35.4 °C)  Max: 99.1 °F (37.3 °C)  The patient is seen and evaluated at bedside he is on the ventilator at 30% FiO2 and remains with significant cognitive deficiencies  He did develop atrial fibrillation rapid ventricular response requiring amiodarone drip. He also is getting PRBC transfusions for anemia.     Review of Systems   Unable to perform ROS: Intubated       Physical Examination :     Physical Exam  Constitutional:       Appearance: He is well-developed. Interventions: He is intubated. HENT:      Head: Normocephalic and atraumatic. Eyes:      Comments: There is some chemosis   Cardiovascular:      Rate and Rhythm: Normal rate. Heart sounds: Normal heart sounds. Pulmonary:      Effort: Pulmonary effort is normal. He is intubated. Breath sounds: Normal breath sounds. Abdominal:      General: Bowel sounds are normal. There is distension. Palpations: Abdomen is soft. Musculoskeletal:      Comments: The patient has bilateral upper and lower extremity edema   Skin:     General: Skin is warm and dry. Neurological:      Comments: The patient did have some nonpurposeful movements of his head and upper extremity         Laboratory data:   I have independently reviewed the followinglabs:  CBC with Differential:   Recent Labs     08/05/20 0409 08/06/20 0449   WBC 19.5* 31.3*   HGB 7.4* 6.8*   HCT 22.1* 21.5*    227     BMP:   Recent Labs     08/05/20 0409 08/06/20 0449    144   K 5.0 5.5*   CL 98 99   CO2 15* 14*   * 217*   CREATININE 11.76* 12.34*   MG  --  2.7*     Hepatic Function Panel:   No results for input(s): PROT, LABALBU, BILIDIR, IBILI, BILITOT, ALKPHOS, ALT, AST in the last 72 hours. No results for input(s): VANCOTROUGH in the last 72 hours. No results found for: CRP  No results found for: SEDRATE    No results for input(s): PROCAL in the last 72 hours.      D-Dimer, Quant  7.55  mg/L FEU  Final  08/03/2020  5:34 AM  [address]      Fibrinogen  428High    140 - 420 mg/dL  Final  08/03/2020  5:34 AM  [address]          D-Dimer, Quant  7.35  mg/L FEU  Final  08/01/2020  5:36 AM  [address]      Fibrinogen  429High    140 - 420 mg/dL  Final  08/01/2020  5:36 AM  [address]        D-Dimer, Quant  24.85  mg/L FEU  Final  07/31/2020  4:00 AM  [address]      Fibrinogen  415  140 - 420 mg/dL  Final  07/31/2020  4:00 AM  [address]        Lactic Acid, Whole Blood  5. 4High    0.7 - 2.1 mmol/L  Final  07/30/2020  9:40 AM  [address]      -Dimer, Quant  58.48  mg/L FEU  Final  07/30/2020  4:19 AM  [address]      Fibrinogen  291  140 - 420 mg/dL  Final  07/30/2020  4:19 AM  [address]        Lactic Acid, Whole Blood  10. 3High    0.7 - 2.1 mmol/L  Final  07/29/2020  1:24 PM  [address]        Imaging Studies:   MRI OF THE BRAIN WITHOUT CONTRAST  7/31/2020 7:13 pm  Impression    Evidence of diffusely increased diffusion signal throughout the cortex. Clinical correlation is strongly recommended.  Findings can be seen with    hypoxic ischemic injury in the appropriate clinical setting. ONE XRAY VIEW OF THE CHEST 7/30/2020 6:12 pm  Impression    Satisfactory position of the lines and catheters.         Cardiomegaly with perihilar edema.               RETROPERITONEAL ULTRASOUND OF THE KIDNEYS 7/30/2020   Impression    Increased cortical echogenicity consistent with medical renal disease.  No    hydronephrosis or nephrolithiasis.  Increased fluid in the bowel. Cultures:     Culture, Blood 2 [[id number]]   Collected: 07/30/20 0157    Order Status: Completed  Specimen: Blood  Updated: 08/05/20 0004     Specimen Description  . BLOOD     Special Requests  RT HAND 10ML     Culture  NO GROWTH 6 DAYS    Culture, Blood 1 [[id number]]   Collected: 07/30/20 0147    Order Status: Completed  Specimen: Blood  Updated: 08/05/20 0004     Specimen Description  . BLOOD     Special Requests  LT HAND 10ML     Culture  NO GROWTH 6 DAYS    Culture, Blood 1 [[id number]]   Collected: 07/27/20 2028    Order Status: Completed  Specimen: Blood  Updated: 08/02/20 0459     Specimen Description  . BLOOD     Special Requests  NOT REPORTED     Culture  NO GROWTH 6 DAYS Culture, Blood 1 [[id number]]   Collected: 07/27/20 2030    Order Status: Completed  Specimen: Blood  Updated: 08/02/20 0459     Specimen Description  . BLOOD     Special Requests  NOT REPORTED     Culture  NO GROWTH 6 DAYS        Culture, Blood 1 [[id number]]   Collected: 07/27/20 2028    Order Status: Completed  Specimen: Blood  Updated: 08/02/20 0459     Specimen Description  . BLOOD     Special Requests  NOT REPORTED     Culture  NO GROWTH 6 DAYS    Culture, Blood 1 [[id number]]   Collected: 07/27/20 2030    Order Status: Completed  Specimen: Blood  Updated: 08/02/20 0459     Specimen Description  . BLOOD     Special Requests  NOT REPORTED     Culture  NO GROWTH 6 DAYS      Gastrointestinal Panel, Molecular [[id number]]   Collected: 07/28/20 1220    Order Status: Completed  Specimen: Stool  Updated: 07/29/20 1542     Specimen Description  . FECES     Campylobacter PCR  NEGATIVE: No Campylobacter spp. (jejuni or coli) DNA Detected     Salmonella PCR  NEGATIVE: No Salmonella spp. DNA Detected     Shigatoxin Gene PCR  NEGATIVE: No Shiga toxin-producing gene(s) Detected     Shigella Sp PCR  NEGATIVE: No Shigella spp. / EIEC DNA Detected     Plesiomonas Shigelloides PCR  NEGATIVE: No Plesionomas shigelloides DNA Detected     Vibrio PCR  NEGATIVE: No Vibrio (V. vulnificus, V, parahaemolyticus and V. cholerae) DNA Detected     E Coli Enterotoxigenic PCR  NEGATIVE: No Enterotoxigenic E. coli (ETEC) Heat-labile and heat-stable (LT/ST) DNA Detected     Yersinia Enterocolitica PCR  NEGATIVE: No Yersinia enterocolitica DNA Detected    C DIFF TOXIN/ANTIGEN [[id number]]   Collected: 07/28/20 1220    Order Status: Completed  Specimen: Stool  Updated: 07/29/20 0850     Specimen Description  . FECES     C DIFF AG + TOXIN  NEGATIVE     Comment:  No C. difficile antigen and Toxin Detected.        Culture, Urine [[id number]]   Collected: 07/27/20 2154    Order Status: Completed  Specimen: Urine, clean catch  Updated: 07/28/20 2112 Specimen Description  . CLEAN CATCH URINE     Special Requests  NOT REPORTED     Culture  NO SIGNIFICANT GROWTH        Medications:      albumin human  50 g Intravenous BID    hydrocortisone sodium succinate PF  25 mg Intravenous Q8H    aspirin  81 mg Oral Daily    famotidine  20 mg Oral Daily    insulin lispro  0-12 Units Subcutaneous Q6H    insulin glargine  10 Units Subcutaneous Nightly    levothyroxine  50 mcg Intravenous Daily    And    sodium chloride (PF)  5 mL Intravenous Daily    sodium chloride flush  10 mL Intravenous 2 times per day    sodium chloride flush  10 mL Intravenous 2 times per day    atorvastatin  40 mg Oral Nightly           [facility]  Maxwell Jones MD  [employer] messaging  OFFICE: [phone]      Electronically signed by Maxwell Jones MD on 8/6/2020 at 10:06 AM  Thank you for allowing us to participate in the care of this patient. Please call with questions. This note iscreated with the assistance of a speech recognition program.  While intending to generate a document that actually reflects the content of the visit, the document can still have some errors including those of syntax andsound a like substitutions which may escape proof reading. In such instances, actual meaning can be extrapolated by contextual diversion.

## 2020-08-06 NOTE — PROGRESS NOTES
This nurse spent approximately 2.5 hours in the room with zoom call setup by  to members of patient's family so that they could spend time speaking to him before withdrawal of care. They stated several times that they did not understand why the patient was not waking up. This nurse attempted to explain about the anoxic injury sustained during one of codes the patient had been through. One of the family members, [place], said that she wanted to see an MRI. It was then explained that an MRI would not show an anoxic injury. At this point, this nurse suggested the family speak to the doctor if they were questioning the withdrawal of care of the patient. The family continued to question this nurse. The family then asked why they could not stay on the zoom call when extubating the patient. This nurse told them if that was their wish, that could be done. This nurse then left the room to get the respiratory therapist and the pre-medications for extubation. Upon entering the room, the respiratory therapist explained that she would be doing oral care on the patient. This nurse explained about pre-medicating the patient. After beginning administering the pre-medications, one of the family members asked us to wait. This nurse finished administering the meds, but told the family that we would wait. The family members then began asking why the medication was given. This nurse again attempted to explain. The family members began getting more and more upset with this nurse. This nurse then asked the respiratory therapist to get the charge nurse. When the charge nurse came to the room, he attempted to ask the family members what he could do for them. The family members began speaking all at once. The charge nurse attempted to answer all their questions. They again asked for the MRI. They stated that they needed to see proof of the coronavirus.   They stated they no longer wanted the patient extubated until they had proof the patient was actually [place] dead. \"  They stated multiple times that we were \"anxious to pull the plug. \"  The charge nurse attempted to explain that was not the case. At that point the charge nurse explained that the call needed to be ended and the doctors called. The family members again began talking over each other. The charge nurse again explained that he was going to end the call and contact the doctors, then ended the call.

## 2020-08-06 NOTE — PROGRESS NOTES
Physical Therapy  DATE: 2020    NAME: Christopher Munson  MRN: [id number]   : 1960    Patient not seen this date for Physical Therapy due to:  [] Blood transfusion in progress  [] Hemodialysis  []  Patient Declined  [] Spine Precautions   [] Strict Bedrest  [] Surgery/ Procedure  [] Testing      [x] Other--pt with poor prognosis, discussion re: changing CODE status; hold PT and check status Monday 8/10/20        [] PT being discontinued at this time. Patient independent. No further needs. [] PT being discontinued at this time as the patient has been transferred to palliative care. No further needs.     Lorena Sullivan, PT

## 2020-08-06 NOTE — PROGRESS NOTES
Hypotension albumin was given  Started levophed  afib with rvr complicated discion cardiology consult stated on amiodaron as pt is hypotensive hold off anticogulation until seen by cardiology as pt is also anemic  Anemia blood transfusion

## 2020-08-06 NOTE — PROGRESS NOTES
atorvastatin  40 mg Oral Nightly     Continuous Infusions:   norepinephrine Stopped (08/06/20 0151)    amiodarone 1 mg/min (08/06/20 0654)    Followed by   [facility] amiodarone      furosemide (LASIX) 5mg/ml infusion 40 mg/hr (08/05/20 2123)    dextrose       PRN Meds:   artificial tears, , PRN  metoprolol, 5 mg, Q4H PRN  metoprolol, 2.5 mg, Q6H PRN  potassium chloride, 20 mEq, PRN  sodium chloride flush, 10 mL, PRN  acetaminophen, 650 mg, Q6H PRN    Or  acetaminophen, 650 mg, Q6H PRN  polyethylene glycol, 17 g, Daily PRN  promethazine, 12.5 mg, Q6H PRN    Or  ondansetron, 4 mg, Q6H PRN  nicotine, 1 patch, Daily PRN  LORazepam, 2 mg, Q4H PRN  diphenhydrAMINE, 25 mg, Q6H PRN  glucose, 15 g, PRN  dextrose, 12.5 g, PRN  glucagon (rDNA), 1 mg, PRN  dextrose, 100 mL/hr, PRN        SUPPORT DEVICES: [x] Ventilator [] BIPAP  [] Nasal Cannula [] Room Air    VENT SETTINGS (Comprehensive) (if applicable):    Vent Information  $Ventilation: $Subsequent Day  Skin Assessment: Clean, dry, & intact  Suction Catheter Diameter: 14  Equipment ID: [id number]  Equipment Changed: HME  Vent Type: Servo i  Vent Mode: PRVC  Vt Ordered: 530 mL  Rate Set: 14 bmp  Pressure Support: 6 cmH20  FiO2 : 40 %  SpO2: 97 %  SpO2/FiO2 ratio: 247.5  Sensitivity: 2  Flow by: 2  PEEP/CPAP: 7  I Time/ I Time %: 0.9 s  Humidification Source: HME  Nitric Oxide/Epoprostenol In Use?: No  Additional Respiratory  Assessments  Pulse: 130  Resp: 21  SpO2: 97 %  End Tidal CO2: 26 (%)  Position: Semi-[place]'s  Humidification Source: HME  Oral Care Completed?: Yes  Oral Care: Mouth swabbed, Mouth suctioned, Suction toothette, Teeth brushed, Mouthwash  Subglottic Suction Done?: Yes  Cuff Pressure (cm H2O): (mov)  Skin barrier applied: No    ABGs:     Lab Results   Component Value Date    EFC4HSU 19 08/05/2020    FIO2 30.0 08/05/2020       DATA:  Complete Blood Count:   Recent Labs     08/04/20  0420 08/05/20  0409 08/06/20  0449   WBC 15.0* 19.5* 31.3*   RBC 2.55* 2.54* 2.41* HGB 7.2* 7.4* 6.8*   HCT 21.8* 22.1* 21.5*   MCV 85.5 87.0 89.2   MCH 28.2 29.1 28.2   MCHC 33.0 33.5 31.6   RDW 14.8* 15.0* 15.3*    226 227   MPV 11.6 10.7 10.0        Last 3 Blood Glucose:   Recent Labs     08/04/20  0420 08/05/20  0409 08/06/20  0449   GLUCOSE 150* 92 92        PT/INR:    Lab Results   Component Value Date    PROTIME 10.9 07/28/2020    INR 1.0 07/28/2020     PTT:    Lab Results   Component Value Date    APTT 29.6 08/06/2020       Comprehensive Metabolic Profile:   Recent Labs     08/04/20 0420 08/05/20 0409 08/06/20  0449    142 144   K 5.0 5.0 5.5*   CL 98 98 99   CO2 15* 15* 14*   * 204* 217*   CREATININE 9.98* 11.76* 12.34*   GLUCOSE 150* 92 92   CALCIUM 6.7* 6.9* 6.5*      Magnesium:   Lab Results   Component Value Date    MG 2.7 08/06/2020    MG 1.4 07/31/2020    MG 1.5 07/30/2020     Phosphorus:   Lab Results   Component Value Date    PHOS 2.3 07/30/2020     Ionized Calcium: No results found for: CAION     Urinalysis:   Lab Results   Component Value Date    NITRU NEGATIVE 07/27/2020    COLORU YELLOW 07/27/2020    PHUR 5.0 07/27/2020    WBCUA None 07/27/2020    RBCUA 0 TO 2 07/27/2020    MUCUS NOT REPORTED 07/27/2020    TRICHOMONAS NOT REPORTED 07/27/2020    YEAST NOT REPORTED 07/27/2020    BACTERIA NOT REPORTED 07/27/2020    SPECGRAV 1.052 07/27/2020    LEUKOCYTESUR NEGATIVE 07/27/2020    UROBILINOGEN Normal 07/27/2020    BILIRUBINUR NEGATIVE 07/27/2020    GLUCOSEU NEGATIVE 07/27/2020    KETUA NEGATIVE 07/30/2020    AMORPHOUS NOT REPORTED 07/27/2020       HgBA1c:    Lab Results   Component Value Date    LABA1C 5.3 07/28/2020     TSH:    Lab Results   Component Value Date    TSH 0.01 08/06/2020       Xr Chest (single View Frontal)    Result Date: 7/30/2020  Satisfactory position of the lines and catheters. Cardiomegaly with perihilar edema. Ct Head Wo Contrast    Result Date: 7/27/2020  No acute intracranial abnormality.  Findings were communicated electronically to Dr. Rolanda Pereira through the radiology results communication center. Us Renal Complete    Result Date: 7/30/2020  Increased cortical echogenicity consistent with medical renal disease. No hydronephrosis or nephrolithiasis. Increased fluid in the bowel. Xr Chest Portable    Result Date: 7/29/2020  Right IJ central line appears in satisfactory position without evidence of pneumothorax. Endotracheal tube also appears in satisfactory position. Ct Chest Pulmonary Embolism W Contrast    Result Date: 7/27/2020  No evidence of pulmonary embolism or acute pulmonary abnormality. Cta Head Neck W Contrast    Result Date: 7/27/2020  No large vessel occlusion or hemodynamic stenosis Multilevel degenerate changes cervical spine. Mri Limited Brain    Result Date: 7/31/2020  Evidence of diffusely increased diffusion signal throughout the cortex. Clinical correlation is strongly recommended. Findings can be seen with hypoxic ischemic injury in the appropriate clinical setting. ASSESSMENT:     Acute hypoxic respiratory failure due to covid -19 pneumonia     Lactic acidosis   PEA cardiac arrest   Anoxic brain injury   Shock - adrenal insufficiency, better   LEILA   Hypokalemia     COVID-19 virus infection    Secondary hypothyroidism    History of pituitary tumor    Seizure (HCC)    Somnolence    Pituitary insufficiency (HCC)    High anion gap metabolic acidosis    New onset atrial fibrillation (HCC)    Pulseless electrical activity (HCC)  Resolved Problems:    * No resolved hospital problems. *  Acute kidney injury  Hyperkalemia secondary to acute kidney injury  Hypermagnesemia secondary to acute kidney injury  Mild hypocalcemia  Anemia, slightly worse    PLAN:   1. Current condition mostly related to severe anoxic brain injury in the context of multiple PEA arrests complicated by [id number] virus infection. 2. Continue ventilator support per ARDS protocol.   3. Agree with recommendations for palliative care and withdrawal of support. 4. Try to reach family consensus. Hemodialysis non-beneficial care in this context and not indicated. 5. Discussed with Dr. Deanna Baxter. He is going to call the daughter today and recommend comfort care for the patient  6. Patient is on Pepcid  7. Anticoagulation held secondary to anemia  8. Patient is on hydrocortisone  9. Patient is on diuretics and nephrology is following patient    Total critical care time caring for this patient with life threatening, unstable organ failure, including direct patient contact, management of life support systems, review of data including imaging and labs, discussions with other team members and physicians at least 27  Min so far today, excluding procedures.      Electronically signed by Shailesh Farias MD on 8/6/2020 at 8:52 AM   Crit Care time 30min

## 2020-08-07 NOTE — PLAN OF CARE
Problem: ACTIVITY INTOLERANCE/IMPAIRED MOBILITY  Goal: Mobility/activity is maintained at optimum level for patient  8/7/2020 1940 by Shanique Giles RCP  Outcome: Ongoing     Problem: COMMUNICATION IMPAIRMENT  Goal: Ability to express needs and understand communication  8/7/2020 1940 by Shanique Giles RCP  Outcome: Ongoing     Problem: OXYGENATION/RESPIRATORY FUNCTION  Goal: Patient will maintain patent airway  8/7/2020 1940 by Shanique Giles RCP  Outcome: Ongoing     Problem: OXYGENATION/RESPIRATORY FUNCTION  Goal: Patient will achieve/maintain normal respiratory rate/effort  Description: Respiratory rate and effort will be within normal limits for the patient  8/7/2020 1940 by Shanique Giles RCP  Outcome: Ongoing     Problem: MECHANICAL VENTILATION  Goal: Mobility/activity is maintained at optimum level for patient  8/7/2020 1940 by Shanique Giles RCP  Outcome: Ongoing     Problem: MECHANICAL VENTILATION  Goal: Ability to express needs and understand communication  8/7/2020 1940 by Shanique Giles RCP  Outcome: Ongoing     Problem: MECHANICAL VENTILATION  Goal: Patient will maintain patent airway  8/7/2020 1940 by Shanique Giles RCP  Outcome: Ongoing     Problem: MECHANICAL VENTILATION  Goal: Patient will maintain patent airway  8/7/2020 1940 by Shanique Giles RCP  Outcome: Ongoing     Problem: MECHANICAL VENTILATION  Goal: Oral health is maintained or improved  8/7/2020 1940 by Shanique Giles RCP  Outcome: Ongoing     Problem: MECHANICAL VENTILATION  Goal: ET tube will be managed safely  8/7/2020 1940 by Shanique Giles RCP  Outcome: Ongoing

## 2020-08-07 NOTE — PROGRESS NOTES
I spoke with palliative care nurse coordinator last evening and also had contact with the bedside RN and attending MD.  The family has learned a lot over the last number of days as the nursing and physician teams have spent many hours listening and  Explaining the patient's medical condition and prognosis. Six of nine were clear they did not want the patient to suffer so chose to remove life support and provide comfort measures. Last evening some family member's asked questions as preparation for the terminal extubation began. They expressed understandable upset witnessing the preparation of a very new and challenging experience for them. Listening to the upset as we have been doing very well will continue to be useful so they can actually hear the clinical description and trust that we too want what is in the patient's very best interest.     Contact from those the family has come to trust from palliative, spiritual, nursing, medical will help ease the situation. Our patient calm listening presence prepares them to face his suffering and be certain we follow what he would want.

## 2020-08-07 NOTE — PLAN OF CARE
- discussed with patient's daughter, Aurelia Haney, this evening.  - plan for liberation from mechanical ventilation tomorrow morning.  - I will ask for palliative care and spiritual care to reach out to patient's daughter tomorrow morning to facilitate (do siblings need to be involved, zoom call, etc)    Klarissa Flores, [facility]

## 2020-08-07 NOTE — PLAN OF CARE
Problem: Airway Clearance - Ineffective  Goal: Achieve or maintain patent airway  8/7/2020 0915 by Camilo Bautista RN  Outcome: Ongoing  8/7/2020 0555 by Yareli White RN  Outcome: Ongoing     Problem: Gas Exchange - Impaired  Goal: Absence of hypoxia  8/7/2020 0915 by Camilo Bautsita RN  Outcome: Ongoing  8/7/2020 0555 by Yareli White RN  Outcome: Ongoing  Goal: Promote optimal lung function  8/7/2020 0915 by Camilo Bautista RN  Outcome: Ongoing  8/7/2020 0555 by Yareli White RN  Outcome: Ongoing     Problem: Breathing Pattern - Ineffective  Goal: Ability to achieve and maintain a regular respiratory rate  8/7/2020 0915 by Camilo Bautista RN  Outcome: Ongoing  8/7/2020 0555 by Yareli White RN  Outcome: Ongoing     Problem:  Body Temperature -  Risk of, Imbalanced  Goal: Ability to maintain a body temperature within defined limits  8/7/2020 0915 by Camilo Bautista RN  Outcome: Ongoing  8/7/2020 0555 by Yareli White RN  Outcome: Ongoing  Goal: Will regain or maintain usual level of consciousness  8/7/2020 0915 by Camilo Bautista RN  Outcome: Ongoing  8/7/2020 0555 by Yareli White RN  Outcome: Ongoing  Goal: Complications related to the disease process, condition or treatment will be avoided or minimized  8/7/2020 0915 by Camilo Bautista RN  Outcome: Ongoing  8/7/2020 0555 by Yareli White RN  Outcome: Ongoing     Problem: Isolation Precautions - Risk of Spread of Infection  Goal: Prevent transmission of infection  8/7/2020 0915 by Camilo Bautista RN  Outcome: Ongoing  8/7/2020 0555 by Yareli White RN  Outcome: Ongoing     Problem: Nutrition Deficits  Goal: Optimize nutrtional status  8/7/2020 0915 by Camilo Bautista RN  Outcome: Ongoing  8/7/2020 0555 by Yareli White RN  Outcome: Ongoing     Problem: Risk for Fluid Volume Deficit  Goal: Maintain normal heart rhythm  8/7/2020 0915 by Camilo Bautista RN  Outcome: Ongoing  8/7/2020 0555 by Yareli White RN  Outcome: Ongoing  Goal: Maintain absence of muscle cramping  8/7/2020 0915 by Manjinder Schreiber RN  Outcome: Ongoing  8/7/2020 0555 by Jose Miguel Zepeda RN  Outcome: Ongoing  Goal: Maintain normal serum potassium, sodium, calcium, phosphorus, and pH  8/7/2020 0915 by Manjinder Schreiber RN  Outcome: Ongoing  8/7/2020 0555 by Jose Miguel Zepeda RN  Outcome: Ongoing     Problem: Loneliness or Risk for Loneliness  Goal: Demonstrate positive use of time alone when socialization is not possible  8/7/2020 0915 by Manjinder Schreiber RN  Outcome: Ongoing  8/7/2020 0555 by Jose Miguel Zepeda RN  Outcome: Ongoing     Problem: Fatigue  Goal: Verbalize increase energy and improved vitality  8/7/2020 0915 by Manjinder Schreiber RN  Outcome: Ongoing  8/7/2020 0555 by Jose Miguel Zepeda RN  Outcome: Ongoing     Problem: Patient Education: Go to Patient Education Activity  Goal: Patient/Family Education  8/7/2020 0915 by Manjinder Schreiber RN  Outcome: Ongoing  8/7/2020 0555 by Jose Miguel Zepeda RN  Outcome: Ongoing     Problem: Skin Integrity:  Goal: Will show no infection signs and symptoms  Description: Will show no infection signs and symptoms  8/7/2020 0915 by Manjinder Schreiber RN  Outcome: Ongoing  8/7/2020 0555 by Jose Miguel Zepeda RN  Outcome: Ongoing  Goal: Absence of new skin breakdown  Description: Absence of new skin breakdown  8/7/2020 0915 by Manjinder Schreiber RN  Outcome: Ongoing  8/7/2020 0555 by Jose Miguel Zepeda RN  Outcome: Ongoing     Problem: Falls - Risk of:  Goal: Will remain free from falls  Description: Will remain free from falls  8/7/2020 0915 by Manjinder Schreiber RN  Outcome: Ongoing  8/7/2020 0555 by Jose Miguel Zepeda RN  Outcome: Ongoing  Goal: Absence of physical injury  Description: Absence of physical injury  8/7/2020 0915 by Manjinder Schreiber RN  Outcome: Ongoing  8/7/2020 0555 by Jose Miguel Zepeda RN  Outcome: Ongoing     Problem: Confusion - Acute:  Goal: Absence of continued neurological deterioration signs and symptoms  Description: Absence of continued neurological deterioration signs and symptoms  8/7/2020 0915 by Consuelo Julian RN  Outcome: Ongoing  8/7/2020 0555 by Martha Claros RN  Outcome: Ongoing  Goal: Mental status will be restored to baseline  Description: Mental status will be restored to baseline  8/7/2020 0915 by Consuelo Julian RN  Outcome: Ongoing  8/7/2020 0555 by Martha Claros RN  Outcome: Ongoing     Problem: Discharge Planning:  Goal: Ability to perform activities of daily living will improve  Description: Ability to perform activities of daily living will improve  8/7/2020 0915 by Consuelo Julian RN  Outcome: Ongoing  8/7/2020 0555 by Martha Claros RN  Outcome: Ongoing  Goal: Participates in care planning  Description: Participates in care planning  8/7/2020 0915 by Consuelo Julian RN  Outcome: Ongoing  8/7/2020 0555 by Martha Claros RN  Outcome: Ongoing     Problem: Injury - Risk of, Physical Injury:  Goal: Will remain free from falls  Description: Will remain free from falls  8/7/2020 0915 by Consuelo Julian RN  Outcome: Ongoing  8/7/2020 0555 by Martha Claros RN  Outcome: Ongoing  Goal: Absence of physical injury  Description: Absence of physical injury  8/7/2020 0915 by Consuelo Julian RN  Outcome: Ongoing  8/7/2020 0555 by Martha Claros RN  Outcome: Ongoing     Problem: Mood - Altered:  Goal: Mood stable  Description: Mood stable  8/7/2020 0915 by Consuelo Julian RN  Outcome: Ongoing  8/7/2020 0555 by Martha Claros RN  Outcome: Ongoing  Goal: Absence of abusive behavior  Description: Absence of abusive behavior  8/7/2020 0915 by Consuelo Julian RN  Outcome: Ongoing  8/7/2020 0555 by Martha Claros RN  Outcome: Ongoing  Goal: Verbalizations of feeling emotionally comfortable while being cared for will increase  Description: Verbalizations of feeling emotionally comfortable while being cared for will increase  8/7/2020 0915 by Consuelo Julian RN  Outcome: Ongoing  8/7/2020 0555 by Martha Claros RN  Outcome: Ongoing     Problem: Psychomotor Activity - Altered:  Goal: Absence of psychomotor disturbance signs and symptoms  Description: Absence of psychomotor disturbance signs and symptoms  8/7/2020 0915 by Arla Burkitt, RN  Outcome: Ongoing  8/7/2020 0555 by Padmini Dowling RN  Outcome: Ongoing     Problem: Sensory Perception - Impaired:  Goal: Demonstrations of improved sensory functioning will increase  Description: Demonstrations of improved sensory functioning will increase  8/7/2020 0915 by Arla Burkitt, RN  Outcome: Ongoing  8/7/2020 0555 by Padmini Dowling RN  Outcome: Ongoing  Goal: Decrease in sensory misperception frequency  Description: Decrease in sensory misperception frequency  8/7/2020 0915 by Arla Burkitt, RN  Outcome: Ongoing  8/7/2020 0555 by Padmini Dowling RN  Outcome: Ongoing  Goal: Able to refrain from responding to false sensory perceptions  Description: Able to refrain from responding to false sensory perceptions  8/7/2020 0915 by Arla Burkitt, RN  Outcome: Ongoing  8/7/2020 0555 by Padmini Dowling RN  Outcome: Ongoing  Goal: Demonstrates accurate environmental perceptions  Description: Demonstrates accurate environmental perceptions  8/7/2020 0915 by Arla Burkitt, RN  Outcome: Ongoing  8/7/2020 0555 by Padmini Dowling RN  Outcome: Ongoing  Goal: Able to distinguish between reality-based and nonreality-based thinking  Description: Able to distinguish between reality-based and nonreality-based thinking  8/7/2020 0915 by Arla Burkitt, RN  Outcome: Ongoing  8/7/2020 0555 by Padmini Dowling RN  Outcome: Ongoing  Goal: Able to interrupt nonreality-based thinking  Description: Able to interrupt nonreality-based thinking  8/7/2020 0915 by Arla Burkitt, RN  Outcome: Ongoing  8/7/2020 0555 by Padmini Dowling RN  Outcome: Ongoing     Problem: Sleep Pattern Disturbance:  Goal: Appears well-rested  Description: Appears well-rested  8/7/2020 0915 by Arla Burkitt, RN  Outcome: Ongoing  8/7/2020 0555 by Padmini Dowling RN  Outcome: Ongoing     Problem: HEMODYNAMIC STATUS  Goal: Patient has stable vital signs and fluid balance  8/7/2020 0915 by Consuelo Julian RN  Outcome: Ongoing  8/7/2020 0555 by Martha Claros RN  Outcome: Ongoing     Problem: ACTIVITY INTOLERANCE/IMPAIRED MOBILITY  Goal: Mobility/activity is maintained at optimum level for patient  8/7/2020 0915 by Consuelo Julian RN  Outcome: Ongoing  8/7/2020 0555 by Martha Claros RN  Outcome: Ongoing  8/6/2020 1947 by Yvette Ramos RCP  Outcome: Ongoing     Problem: COMMUNICATION IMPAIRMENT  Goal: Ability to express needs and understand communication  8/7/2020 0915 by Consuelo Julian RN  Outcome: Ongoing  8/7/2020 0555 by Martha Claros RN  Outcome: Ongoing  8/6/2020 1947 by Yvette Ramos RCP  Outcome: Ongoing     Problem: OXYGENATION/RESPIRATORY FUNCTION  Goal: Patient will maintain patent airway  8/7/2020 0915 by Consuelo Julian RN  Outcome: Ongoing  8/7/2020 0555 by Martha Claros RN  Outcome: Ongoing  8/6/2020 1947 by Yvette Ramos RCP  Outcome: Ongoing  Goal: Patient will achieve/maintain normal respiratory rate/effort  Description: Respiratory rate and effort will be within normal limits for the patient  8/7/2020 0915 by Consuelo Julian RN  Outcome: Ongoing  8/7/2020 0555 by Martha Claros RN  Outcome: Ongoing  8/6/2020 1947 by Yvette Ramos RCP  Outcome: Ongoing     Problem: MECHANICAL VENTILATION  Goal: Mobility/activity is maintained at optimum level for patient  8/7/2020 0915 by Consuelo Julian RN  Outcome: Ongoing  8/7/2020 0555 by Martha Claros RN  Outcome: Ongoing  8/6/2020 1947 by Yvette Ramos RCP  Outcome: Ongoing  Goal: Ability to express needs and understand communication  8/7/2020 0915 by Consuelo Julian RN  Outcome: Ongoing  8/7/2020 0555 by Martha Claros RN  Outcome: Ongoing  8/6/2020 1947 by Yvette Ramos RCP  Outcome: Ongoing  Goal: Patient will maintain patent airway  8/7/2020 0915 by Consuelo Julian RN  Outcome: Ongoing  8/7/2020 0555 by Martha Claros RN  Outcome: Ongoing  8/6/2020 1947 by Nolvia Rosado RCP  Outcome: Ongoing  Goal: Patient will maintain patent airway  8/7/2020 0915 by Solomon Grant RN  Outcome: Ongoing  8/7/2020 0555 by Aishwarya Moses RN  Outcome: Ongoing  8/6/2020 1947 by Nolvia Rosado RCP  Outcome: Ongoing  Goal: Oral health is maintained or improved  8/7/2020 0915 by Solomon Grant RN  Outcome: Ongoing  8/7/2020 0555 by Aishwarya Moses RN  Outcome: Ongoing  8/6/2020 1947 by Nolvia Rosaod RCP  Outcome: Ongoing  Goal: ET tube will be managed safely  8/7/2020 0915 by Solomon Grant RN  Outcome: Ongoing  8/7/2020 0555 by Aishwarya Moses RN  Outcome: Ongoing  8/6/2020 1947 by Nolvia Rosado RCP  Outcome: Ongoing     Problem: SKIN INTEGRITY  Goal: Skin integrity is maintained or improved  8/7/2020 0915 by Solomon Grant RN  Outcome: Ongoing  8/7/2020 0555 by Aishwarya Moses RN  Outcome: Ongoing     Problem: NUTRITION  Goal: Nutritional status is improving  8/7/2020 0915 by Solomon Grant RN  Outcome: Ongoing  8/7/2020 0555 by Aishwarya Moses RN  Outcome: Ongoing

## 2020-08-07 NOTE — FLOWSHEET NOTE
Spoke with daughter Wendy Becerra, updated on current status of patient, currently to change in plan of treatment will have family speak with physicians in the morning, no current questions or concerns

## 2020-08-07 NOTE — PROGRESS NOTES
Jaquelin Cesar 18    Progress Note    8/7/2020    10:34 AM    Name:   Diego Domínguez  MRN:     [id number]     Acct:      [de-identified]   Room:   [address] [place] [address] Day:  6  Admit Date:  7/27/2020  8:06 PM    PCP:   No primary care provider on file. Code Status:  DNR-CC    Subjective:     C/C:   Chief Complaint   Patient presents with    Altered Mental Status     Interval History Status: unchanged    Patient seen and examined this morning. No acute events overnight. Patient remains clinically unchanged. Only has pupillary reflexes and is breathing over the vent. Otherwise, no neurologic function is noted. Remains on Lasix drip and amiodarone infusion. Blood pressure is appropriate. Hemoglobin improved after PRBCs administered yesterday. Creatinine continues to rise. Potassium rising. 380 cc of urine over the past 24 hours. Brief History:     Diego Domínguez is 60 y.o. male who was admitted to the hospital on 7/27/2020 for treatment of COVID-19 virus infection. Patient was brought to emergency room with altered mental status. He was at work and was found on the ground several hours later. Patient was confused, drowsy and not answering questions. He did not have any focal weakness. Evaluation in ER showed temperature 102. Lab evaluation showed low cortisol level, low TSH. COVID-19 test was positive. CT chest was negative for PE and did not show any Pneumonia. Neurology was consulted for possible TIA Vs syncope. CT head was negative for acute IC abnormality. Patient has history of pituitary adenoma S/P resection in 10/2016 at [facility] clinic by Dr. Janes Ramey. Patient had failed to follow-up with the clinic. Patient follows with Dr. Ira Ortega MD.  It appears from outpatient note with PCP on 5/13/2020 that patient has not been taking his medications.   He has not been following his endocrinologist.  He has chronic pituitary insufficiency and was supposed to be on hydrocortisone, desmopressin, levothyroxine, testosterone and diabetic medications. He has lost 50 pounds and had stopped all medications. Patient was found to have  decrease cortisol and TSH levels. Patient was lethargic and confused. He had PEA arrest on 7/29/2020 with successful ROSC after couple rounds of CPR followed by new onset A. fib. Patient was given amiodarone 150 mg dose and converted normal sinus rhythm. He had more episodes of multiple PEA arrest successfully resuscitated with CPR. Patient had circulatory shock and needed intubation, pressor support with Levophed, epinephrine. Patient has severe anion gap metabolic acidosis  treated with bicarb bolus and infusion. Patient was started on IV Solu-Cortef, IV levothyroxine for pituitary insufficiency. Levophed was gradually weaned blood pressure improved. Neurologic function has not improved. Review of Systems:     Unable to obtain as patient is intubated and unresponsive off of all sedation. Medications:      Allergies:  No Known Allergies    Current Meds:   Scheduled Meds:    hydrocortisone sodium succinate PF  25 mg Intravenous Q8H    aspirin  81 mg Oral Daily    famotidine  20 mg Oral Daily    insulin lispro  0-12 Units Subcutaneous Q6H    insulin glargine  10 Units Subcutaneous Nightly    levothyroxine  50 mcg Intravenous Daily    And    sodium chloride (PF)  5 mL Intravenous Daily    sodium chloride flush  10 mL Intravenous 2 times per day    sodium chloride flush  10 mL Intravenous 2 times per day    atorvastatin  40 mg Oral Nightly     Continuous Infusions:    norepinephrine Stopped (08/06/20 0151)    amiodarone 0.5 mg/min (08/07/20 0757)    furosemide (LASIX) 5mg/ml infusion 10 mg/hr (08/07/20 0238)    dextrose       PRN Meds: LORazepam **OR** LORazepam, glycopyrrolate, fentanNYL **OR** fentanNYL **OR** fentanNYL, artificial tears, metoprolol, metoprolol, potassium chloride, sodium 103 162* 178* 134* 165*     ABG:  Lab Results   Component Value Date    POCPH 7.363 08/05/2020    POCPCO2 31.7 08/05/2020    POCPO2 66.9 08/05/2020    POCHCO3 18.0 08/05/2020    NBEA 7 08/05/2020    PBEA NOT REPORTED 08/05/2020    JJM7XSY 19 08/05/2020    GJLR5FJE 93 08/05/2020    FIO2 30.0 08/05/2020     Lab Results   Component Value Date/Time    SPECIAL RT HAND 10ML 07/30/2020 01:57 AM     Lab Results   Component Value Date/Time    CULTURE NO GROWTH 6 DAYS 07/30/2020 01:57 AM       Radiology:  Kathryn [facility] Chest (single View Frontal)    Result Date: 7/30/2020  Satisfactory position of the lines and catheters. Cardiomegaly with perihilar edema. Ct Head Wo Contrast    Result Date: 7/27/2020  No acute intracranial abnormality. Findings were communicated electronically to Dr. Joan Trevizo through the radiology results communication center. Us Renal Complete    Result Date: 7/30/2020  Increased cortical echogenicity consistent with medical renal disease. No hydronephrosis or nephrolithiasis. Increased fluid in the bowel. Xr Chest Portable    Result Date: 7/29/2020  Right IJ central line appears in satisfactory position without evidence of pneumothorax. Endotracheal tube also appears in satisfactory position. Ct Chest Pulmonary Embolism W Contrast    Result Date: 7/27/2020  No evidence of pulmonary embolism or acute pulmonary abnormality. Cta Head Neck W Contrast    Result Date: 7/27/2020  No large vessel occlusion or hemodynamic stenosis Multilevel degenerate changes cervical spine. Physical Examination:        Due to COVID-19 pandemic and need to conserve PPE, patient was not examined this morning. Per discussions with nursing staff, patient only has pupillary reflexes as occasionally breathes over the vent. He is not withdrawing to noxious stimuli. No corneal reflexes. No gag reflexes.     Assessment:        Hospital Problems           Last Modified POA    * (Principal) COVID-19 8/2/2020 Yes    Toxic metabolic encephalopathy 6/57/9245 Yes    Secondary hypothyroidism 7/29/2020 Yes    History of pituitary tumor 7/28/2020 Yes    Seizure ([address] 7/28/2020 Yes    Somnolence 7/28/2020 Yes    Shock circulatory ([address] 7/29/2020 Yes    Pituitary insufficiency ([address] 7/29/2020 Yes    High anion gap metabolic acidosis 9/01/1508 Yes    New onset atrial fibrillation ([address] 7/29/2020 Yes    Pulseless electrical activity ([address] 7/29/2020 Yes    LEILA (acute kidney injury) ([address] 7/31/2020 Yes    Anoxic encephalopathy ([address] 7/31/2020 Yes    Palliative care encounter 8/2/2020 Yes          Plan:        - Appreciate the assistance of my consultants on the case: nephrology, neurology, infectious disease, ethics, palliative care    - Unresponsiveness/acute metabolic encephalopathy: due to severe anoxic brain injury secondary to CP arrest, significant damage, no anticipated meaningful recovery,this confirmed by neurology team and  MRI brain and EEG results. LTME completed on 8/4.    - PEA cardiac arrest: On 7/29, patient arrested x5 with successful ROSC every time, etiology is unclear    - Acute hypoxemic respiratory failure: Patient is currently ventilated, management per pulmonology. Periodically dropping O2, likely secondary to mucus plugging & air leak. ET tube balloon needing re-inflated multiple times throughout the day. If family is requesting trach/peg/HD, will need to exchange ET tube    Circulatory shock: resolved    - Afib with RVR - overnight on 8/6. Started on levophed and amiodarone. Cardiology re-consulted. Continue amiodarone at this time. - Acute kidney injury: Creatinine continue to worsen, likely ATN from hypovolemia and contrast exposure. On lasix infusion. Holding off on placement of Elias catheter and hemodialysis until final plans regarding withdrawal of care versus not are finalized with family.    - Neutrophilic leukocytosis - somewhat improved today. Unclear of cause. ID following. Afebrile.  Patient with low temp (96.1F) - possible sepsis vs central process? Normocytic / normochromic anemia - improved after administration of PRBCs. Holding off on work-up secondary to possible withdrawal of care. Hyponatremia: Resolved    Anion gap metabolic acidosis: Likely secondary to his acute kidney injury needed bicarb infusion    Pituitary cancer? Status post resection in 2016  Adrenal insufficiency - continue current treatment with cortef    DM2: insulin sliding scale, hypoglycemia protocol    Continue tube feeds    Thrombocytopenia: DVT prophylaxis with EPC. Hold off on chemical anticoagulation in light of renal failure and potential risk of hyperkalemia, especially if we are attempting to avoid HD. If patient's family wishes to proceed with hemodialysis, will start patient on heparin subcutaneous 5000 units every 8 hours today    Maintain DNR. Long discussion with patient's daughter yesterday. Please see yesterday's notes for further information. Patient with very poor prognosis. Very low chance of recovery. Patient needs to be made comfortable. Palliative care to facilitate hopeful withdrawal of care later today. I will be more than willing to assist however I can.     33 Edwina Davenport DO  8/7/2020  10:34 AM

## 2020-08-07 NOTE — PLAN OF CARE
Attempted to call the children of Shan Holter to follow up with them after yesterday's Zoom conference. Palliative care and spiritual care attempted to reach out to each of Jj's children separately and provided them an opportunity to discuss their questions, concerns, and provide them with support during this difficult time. We personally spoke with Hallie Mathew, and Meryle Bras on the telephone. Darcie Dancer was concerned over being with their dad during extubation. I explained that due to covid isolation protocols that we did not allow visitors in covid unit she requested a repeat Covid Swab and Dr Kendell Stone was notified and ordered the covid swab. I encouraged the children to lean on each other for support during this time. I provided Darcie Dancer with the number for spiritual care  [phone] for further support over the weekend. I explained that we were providing them an opportunity to prepare for the extubation but due to his kidney's decline further complications may arise. I did discuss that his code status  is a [facility] comfort care and we would not resuscitate the patient if complications arose. We were unable to reach SUSANA Britt or Ghislaine Amaya, or Amy Morton we left message when able. Called and updated Dr Hilda Moore and Dr. Kendell Stone of above conversation. Total time spent on these conference calls was more than 60 minutes.

## 2020-08-07 NOTE — FLOWSHEET NOTE
I assisted the Palliative NP in calling the children of the patient as follow up to yesterday's event when the family became agitated during a ZOOM call. Per report from the NP the family became concerned as they viewed what was intended to be a terminal extubation until the patient exhibited a twitch in his leg. Per report the family became concerned that this indicated that the patient was trying to get better and the decision was made not to extubate at this time. Per report the next couple of hours involved medical staff answering questions about the patient's condition and prognosis. Per report there was much emotion and anxiety experienced by the family. I was called to assist in calling family members as follow up and to offer spiritual and emotional support. I was able to talk to one of his sons and left a voicemail for another. Two other sons did not answer and I was not able to leave messages. I attempted 2x to call each of them. The son I talked to was at work and I invited him to call me if he wanted to talk about what happened yesterday or if he would like a prayer. He agreed to do so.

## 2020-08-07 NOTE — CARE COORDINATION
Care Transition  Patient is s/p arrest, anoxic brain injury. He remains intubated, 40% FiO2. He remains on amiodarone, lasix gtts.

## 2020-08-07 NOTE — PLAN OF CARE
Problem: Airway Clearance - Ineffective  Goal: Achieve or maintain patent airway  Outcome: Ongoing     Problem: Gas Exchange - Impaired  Goal: Absence of hypoxia  Outcome: Ongoing  Goal: Promote optimal lung function  Outcome: Ongoing     Problem: Breathing Pattern - Ineffective  Goal: Ability to achieve and maintain a regular respiratory rate  Outcome: Ongoing     Problem:  Body Temperature -  Risk of, Imbalanced  Goal: Ability to maintain a body temperature within defined limits  Outcome: Ongoing  Goal: Will regain or maintain usual level of consciousness  Outcome: Ongoing  Goal: Complications related to the disease process, condition or treatment will be avoided or minimized  Outcome: Ongoing     Problem: Isolation Precautions - Risk of Spread of Infection  Goal: Prevent transmission of infection  Outcome: Ongoing     Problem: Nutrition Deficits  Goal: Optimize nutrtional status  Outcome: Ongoing     Problem: Risk for Fluid Volume Deficit  Goal: Maintain normal heart rhythm  Outcome: Ongoing  Goal: Maintain absence of muscle cramping  Outcome: Ongoing  Goal: Maintain normal serum potassium, sodium, calcium, phosphorus, and pH  Outcome: Ongoing     Problem: Loneliness or Risk for Loneliness  Goal: Demonstrate positive use of time alone when socialization is not possible  Outcome: Ongoing     Problem: Fatigue  Goal: Verbalize increase energy and improved vitality  Outcome: Ongoing     Problem: Patient Education: Go to Patient Education Activity  Goal: Patient/Family Education  Outcome: Ongoing     Problem: Skin Integrity:  Goal: Will show no infection signs and symptoms  Description: Will show no infection signs and symptoms  Outcome: Ongoing  Goal: Absence of new skin breakdown  Description: Absence of new skin breakdown  Outcome: Ongoing     Problem: Falls - Risk of:  Goal: Will remain free from falls  Description: Will remain free from falls  Outcome: Ongoing  Goal: Absence of physical injury  Description: sensory perceptions  Outcome: Ongoing  Goal: Demonstrates accurate environmental perceptions  Description: Demonstrates accurate environmental perceptions  Outcome: Ongoing  Goal: Able to distinguish between reality-based and nonreality-based thinking  Description: Able to distinguish between reality-based and nonreality-based thinking  Outcome: Ongoing  Goal: Able to interrupt nonreality-based thinking  Description: Able to interrupt nonreality-based thinking  Outcome: Ongoing     Problem: Sleep Pattern Disturbance:  Goal: Appears well-rested  Description: Appears well-rested  Outcome: Ongoing     Problem: HEMODYNAMIC STATUS  Goal: Patient has stable vital signs and fluid balance  Outcome: Ongoing     Problem: ACTIVITY INTOLERANCE/IMPAIRED MOBILITY  Goal: Mobility/activity is maintained at optimum level for patient  8/7/2020 0555 by Artemio Pimentel RN  Outcome: Ongoing  8/6/2020 1947 by Parth Jenkins RCP  Outcome: Ongoing     Problem: COMMUNICATION IMPAIRMENT  Goal: Ability to express needs and understand communication  8/7/2020 0555 by Artemio Pimentel RN  Outcome: Ongoing  8/6/2020 1947 by Parth Jenkins RCP  Outcome: Ongoing     Problem: OXYGENATION/RESPIRATORY FUNCTION  Goal: Patient will maintain patent airway  8/7/2020 0555 by Artemio Pimentel RN  Outcome: Ongoing  8/6/2020 1947 by Parth Jenkins RCP  Outcome: Ongoing  Goal: Patient will achieve/maintain normal respiratory rate/effort  Description: Respiratory rate and effort will be within normal limits for the patient  8/7/2020 0555 by Artemio Pimentel RN  Outcome: Ongoing  8/6/2020 1947 by Parth Jenkins RCP  Outcome: Ongoing     Problem: MECHANICAL VENTILATION  Goal: Mobility/activity is maintained at optimum level for patient  8/7/2020 0555 by Artemio Pimentel RN  Outcome: Ongoing  8/6/2020 1947 by Parth Jenkins RCP  Outcome: Ongoing  Goal: Ability to express needs and understand communication  8/7/2020 0555 by Artemio Pimentel RN  Outcome: Ongoing  8/6/2020 1947 by Betsy Crenshaw RCP  Outcome: Ongoing  Goal: Patient will maintain patent airway  8/7/2020 0555 by Lashell Sanches RN  Outcome: Ongoing  8/6/2020 1947 by Betsy Crenshaw RCP  Outcome: Ongoing  Goal: Patient will maintain patent airway  8/7/2020 0555 by Lashell Sanches RN  Outcome: Ongoing  8/6/2020 1947 by Betsy Crenshaw RCP  Outcome: Ongoing  Goal: Oral health is maintained or improved  8/7/2020 0555 by Lashell Sanches RN  Outcome: Ongoing  8/6/2020 1947 by Betsy Crenshaw RCP  Outcome: Ongoing  Goal: ET tube will be managed safely  8/7/2020 0555 by Lashell Sanches RN  Outcome: Ongoing  8/6/2020 1947 by Betsy Crenshaw RCP  Outcome: Ongoing     Problem: SKIN INTEGRITY  Goal: Skin integrity is maintained or improved  Outcome: Ongoing     Problem: NUTRITION  Goal: Nutritional status is improving  Outcome: Ongoing

## 2020-08-07 NOTE — PROGRESS NOTES
Neurology Nurse Practitioner Progress Note      INTERVAL HISTORY: This is a 60 y.o.  male admitted 7/27/2020 for unresponsiveness. This is a follow-up neurology progress note. The chart was reviewed. Discussed with the RN. Yesterday, another Zoom meeting was set up with primary team, palliative & pt's RN; reportedly family agreed for extubation when at the last moment they did not want to go through with that when they noticed patient leg twitch. Status was changed to DNR CC yesterday, however, patient's family wanted to repeat the COVID test just in case it was negative, one member wanted to be present for terminal extubation. Rapid COVID test was positive. Final decision awaited. Ethics team is on board. HPI: Issa Perez is a 60 y.o. male with significant H/O pituitary tumor resection (2016), medication noncompliance, DM, who was admitted 7/27/2020 for unresponsiveness. According to medical record, patient was working at a [address] with no air conditioning and his coworkers found him unresponsive on the ground; EMS were called who found him to be febrile with [address]:  At arrival, he was awake but lethargic with some weakness of right leg and slurring; got evaluated by the stroke team with [facility] 4.  CT head was negative. CTA head and neck was unremarkable. He was loaded with Keppra 1 g IVPB x1 with initial plan to maintain at 500 mg twice a day. EEG showed moderate to severe encephalopathy with near continuous frontally dominant rhythmic delta activity appeared to be on ictal interictal spectrum and was concerning for subclinical seizures; Keppra maintenance dose was increased to 1 g IVPB twice a day after bolus of 500 mg was given. COVID was positive; patient was transferred to [facility]. On 7/29 patient had PEA arrest undergoing 2 rounds of CPR, in and out of A. fib requiring bolus of amiodarone before NSR.   Reportedly, he coded 2 more times; got intubated and transferred to ICU where he had another PEA arrest.  During 1 of the codes, his blood sugar was noted to be 11. With code, LTME with diffuse slowing. LTME was D/C'd during that time. Patient developed copious diarrhea; was started on Levophed for shock.  hydrocortisone sodium succinate PF  25 mg Intravenous Q8H    aspirin  81 mg Oral Daily    famotidine  20 mg Oral Daily    insulin lispro  0-12 Units Subcutaneous Q6H    insulin glargine  10 Units Subcutaneous Nightly    levothyroxine  50 mcg Intravenous Daily    And    sodium chloride (PF)  5 mL Intravenous Daily    sodium chloride flush  10 mL Intravenous 2 times per day    sodium chloride flush  10 mL Intravenous 2 times per day    atorvastatin  40 mg Oral Nightly       Past Medical History:   Diagnosis Date    Depression     ED (erectile dysfunction)     Hypothyroid     Migraine     Pituitary tumor     Type 2 diabetes mellitus ([facility] [address]     resolved with 50# weight loss       Past Surgical History:   Procedure Laterality Date    PITUITARY EXCISION  10/05/2016       PHYSICAL EXAM:      Blood pressure (!) 137/55, pulse 82, temperature 95.9 °F (35.5 °C), temperature source Esophageal, resp. rate 16, height 5' 10\" (1.778 m), weight 165 lb 4.8 oz (75 kg), SpO2 98 %. Due to the current efforts to prevent transmission of COVID-19 and also the need to preserve PPE for other caregivers, a face-to-face encounter with the patient was not performed. That being said, all relevant records and diagnostic tests were reviewed, including laboratory results and imaging. Patient was observed through the window. Please reference any relevant documentation elsewhere. Care was coordinated with the primary service.     Per RN, patient stays intubated, off of sedation and pressors  Stays unresponsive; no neurological change  Pupils - reactive  Corneal reflexes - absent  Gag/cough reflex - absent  No response at loud sound or painful stimuli  No limb withdrawal        DATA Lab Results   Component Value Date    WBC 26.2 (H) 08/07/2020    HGB 7.7 (L) 08/07/2020    HCT 23.5 (L) 08/07/2020     08/07/2020    ALT 27 07/28/2020    AST 44 (H) 07/28/2020     08/07/2020    K 5.5 (H) 08/07/2020    CL 97 (L) 08/07/2020    CREATININE 13.50 (HH) 08/07/2020     (HH) 08/07/2020    CO2 14 (L) 08/07/2020    TSH 0.01 (L) 08/06/2020    INR 1.0 07/28/2020    LABA1C 5.3 07/28/2020     Lab Results   Component Value Date    CHOL 150 07/28/2020     Lab Results   Component Value Date    TRIG 131 07/28/2020     Lab Results   Component Value Date    HDL 24 (L) 07/28/2020     Lab Results   Component Value Date    LDLCHOLESTEROL 100 07/28/2020     Lab Results   Component Value Date    VLDL NOT REPORTED 07/28/2020     Lab Results   Component Value Date    CHOLHDLRATIO 6.3 (H) 07/28/2020         DIAGNOSTIC DATA:  CT HEAD (7/27/2020): No acute intracranial abnormality     MRI BRAIN (7/31/2020):   Evidence of diffusely increased diffusion signal throughout the cortex;    hypoxic ischemic injury in the appropriate clinical setting. CTA HEAD & NECK (7/27/2020): Unremarkable      LTME (7/28 - 7/29/2020):  Day 1: During this day of recording no events were recorded. The interictal EEG was abnormal due to diffuse polymorphic delta theta slowing suggesting moderate to severe encephalopathy. Near continuous frontally dominant rhythmic delta activity appeared to be on ictal-interictal spectrum and was concerning for subclinical seizures    Day 2: During this day of recording no events were recorded. The interictal EEG was abnormal due to diffuse polymorphic delta theta slowing suggesting moderate to severe encephalopathy. Near continuous frontally dominant rhythmic delta activity appeared to be on ictal-interictal spectrum and was concerning for subclinical seizures. The EEG was essentially unchanged from yesterday      EEG (7/31/2020):  This EEG was severely abnormal.  No clear discernible cerebral activity was seen. This finding is suggestive of diffuse cortical dysfunction or can be from sedation medication effect      REPEAT LTME (7/31- 8/4/2020): No events were recorded. The interictal EEG was severely abnormal due to attenuated background activity which suggested diffuse cortical dysfunction; consistent with severe anoxic brain injury                  IMPRESSION & PLAN I have 60 y.o.  male admitted with  Anoxic encephalopathy post-cardiac arrest x 4 (7/29); MRI brain -diffuse hypoxic, ischemic injury; LTME -diffuse cortical dysfunction with no electrographic seizures; patient stays intubated; off of sedation; other than reactive pupils, no brain stem reflexes have been evident; is breathing over the vent, as per RN    New onset A. fib post-arrest    COVID positive; off antibiotics, as per ID    LEILA; hypernatremia; nephrology on board    Continue ASA 81 mg QD, Lipitor 40 mg HS    Comorbid conditions- DM, hypothyroidism, pituitary tumor resection (2016), patient noncompliance    Yesterday (8/6), another Zoom meeting was set up with primary team, palliative & pt's RN; status was changed to [facility], reportedly family agreed for extubation when at the last moment they did not want to go through with it when they noticed patient's leg twitch. Patient's family wanted to repeat the COVID test just in case it was negative, one family member wanted to be present for terminal extubation. Rapid COVID test was positive. Final decision awaited. Ethics team is on board. Very poor prognosis    Please note that this note was generated using a voice recognition dictation software. Although every effort was made to ensure the accuracy of this automated transcription, some errors in transcription may have occurred.

## 2020-08-07 NOTE — PROGRESS NOTES
[facility]  Progress Note    Luba Paredes  MRN: [id number]  Date: 8/7/2020    Reason for F/U :   COVID-19 virus infection    Impression :   1. Cardiopulmonary arrest-multiple status post successful resuscitation  2. Anoxic brain injury  3. Acute hypoxic respiratory failure currently ventilator dependent  4. Shock-resolved  · off pressors   5. New onset seizure  6. COVID-19 virus infection without evidence of pneumonia  7. History of pituitary tumor status post resection 2016  8. Acute kidney injury-worsening  9. Hypothyroidism  10. Diabetes mellitus type 2  11. Diarrhea  12. Anasarca  13. Atrial fibrillation with rapid ventricular response  14. Anemia    Recommendations:   · The patient continues off systemic antimicrobial therapy. · The renal parameters continue to worsen  · The COVID-19 infection does not require treatment. · CODE STATUS is DNR CC arrest.  · The patient continues to have significant neurological dysfunction  · The prognosis remains very poor. · The COVID testing remains positive but this is not surprising in a patient with severe illness that they will continue to shed the virus. · There have been discussions with the family per the primary care team and palliative care team for potential withdrawal as his chances of recovery are poor. Infection Control Recommendations:   MXWDG-26 virus infection    Discharge Planning:   Patient will need Midline Catheter Insertion/ PICC line Insertion: No  Patient willneed outpatient wound care: No    MedicalDecision making / Summary of Stay:   Luba Paredes is a 60y.o.-year-old male who was initially admitted on 7/27/2020. Brianna [place] is seen and evaluated at bedside and the chart was reviewed. He is a poor historian and I was not able to get any history from him. 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   He was last known to be well at about 2 PM when a work colleague checked in on him but he was subsequently found down unresponsive for an unknown period of time. No witnessed seizures. EMS was called and temperature was 102 them on arrival in the emergency department was 100.7. The patient also lost control of bowel and bladder and was noted to have tongue biting. COVID-19 virus testing was positive and the patient was admitted to the [facility] unit. The patient does have a history of a pituitary tumor which was resected 2 years ago. The patient is suspected to have had a seizure and I was asked to evaluate for the COVID-19 infection. The patient does not recall the events of what happened and does not able to get much out of him today. The EEG showed diffuse polymorphic delta theta slowing suggestive of moderate to severe encephalopathy and frontal dominant rhythm delta activity concerning for subclinical seizure. On 2020 the patient had clinical deterioration with a PEA arrest and after successful resuscitation he had subsequent PEA and asystolic arrest requiring ACLS protocol. The patient was intubated started on Levophed for blood pressure support and was transferred to the intensive care unit    The patient has minimal neurological response off sedation/ significant neurological dysfunction    The MRI seems to show some anoxic brain injury. Current evaluation:2020    /61   Pulse 81   Temp 97.2 °F (36.2 °C) (Esophageal)   Resp 22   Ht 5' 10\" (1.778 m)   Wt 165 lb 4.8 oz (75 kg)   SpO2 98%   BMI 23.72 kg/m²     Temperature Range: Temp: 97.2 °F (36.2 °C) Temp  Av.7 °F (36.5 °C)  Min: 97.2 °F (36.2 °C)  Max: 98.8 °F (37.1 °C)  The patient is seen and evaluated at bedside he is on the ventilator at 30% FiO2 and remains with significant cognitive deficits. The patient remains on amiodarone drip for the atrial fibrillation. Clinically remains unchanged and urine output remains poor.     Review of Systems   Unable to perform ROS: Intubated       Physical Examination :     Physical Exam  Constitutional:       Appearance: He is well-developed. Interventions: He is intubated. HENT:      Head: Normocephalic and atraumatic. Mouth/Throat:      Comments: The patient is orally intubated on the multiple scabbed lesions and areas of bleeding noted in the oral cavity  Eyes:      Comments: There is some chemosis   Cardiovascular:      Rate and Rhythm: Normal rate. Heart sounds: Normal heart sounds. Pulmonary:      Effort: Pulmonary effort is normal. He is intubated. Breath sounds: Normal breath sounds. Abdominal:      General: Bowel sounds are normal. There is distension. Palpations: Abdomen is soft. Musculoskeletal:      Comments: The patient has bilateral upper and lower extremity edema   Skin:     General: Skin is warm and dry. Neurological:      Comments: The patient did have some nonpurposeful movements of his head and upper extremity         Laboratory data:   I have independently reviewed the followinglabs:  CBC with Differential:   Recent Labs     08/06/20  0449 08/07/20  0503   WBC 31.3* 26.2*   HGB 6.8* 7.7*   HCT 21.5* 23.5*    255     BMP:   Recent Labs     08/06/20  0449 08/07/20  0503    141   K 5.5* 5.5*   CL 99 97*   CO2 14* 14*   * 237*   CREATININE 12.34* 13.50*   MG 2.7*  --      Hepatic Function Panel:   No results for input(s): PROT, LABALBU, BILIDIR, IBILI, BILITOT, ALKPHOS, ALT, AST in the last 72 hours. No results for input(s): VANCOTROUGH in the last 72 hours. No results found for: CRP  No results found for: SEDRATE    No results for input(s): PROCAL in the last 72 hours.      SARS-CoV-2            SARS-CoV-2, Rapid  DETECTEDAbnormal         D-Dimer, Quant  7.55  mg/L FEU  Final  08/03/2020  5:34 AM  [address]       Fibrinogen  428High    140 - 420 mg/dL  Final  08/03/2020  5:34 AM  [facility]          D-Dimer, Quant  7.35  mg/L FEU  Final  08/01/2020  5:36 AM  [facility] Fibrinogen  429High    140 - 420 mg/dL  Final  08/01/2020  5:36 AM  [address]        D-Dimer, Quant  24.85  mg/L FEU  Final  07/31/2020  4:00 AM  [address]      Fibrinogen  415  140 - 420 mg/dL  Final  07/31/2020  4:00 AM  [address]        Lactic Acid, Whole Blood  5. 4High    0.7 - 2.1 mmol/L  Final  07/30/2020  9:40 AM  [address]      -Dimer, Quant  58.48  mg/L FEU  Final  07/30/2020  4:19 AM  [address]      Fibrinogen  291  140 - 420 mg/dL  Final  07/30/2020  4:19 AM  [address]        Lactic Acid, Whole Blood  10. 3High    0.7 - 2.1 mmol/L  Final  07/29/2020  1:24 PM  [address]        Imaging Studies:   MRI OF THE BRAIN WITHOUT CONTRAST  7/31/2020 7:13 pm  Impression    Evidence of diffusely increased diffusion signal throughout the cortex. Clinical correlation is strongly recommended.  Findings can be seen with    hypoxic ischemic injury in the appropriate clinical setting. ONE XRAY VIEW OF THE CHEST 7/30/2020 6:12 pm  Impression    Satisfactory position of the lines and catheters.         Cardiomegaly with perihilar edema.               RETROPERITONEAL ULTRASOUND OF THE KIDNEYS 7/30/2020   Impression    Increased cortical echogenicity consistent with medical renal disease.  No    hydronephrosis or nephrolithiasis.  Increased fluid in the bowel. Cultures:     Culture, Blood 2 [[id number]]   Collected: 07/30/20 0157    Order Status: Completed  Specimen: Blood  Updated: 08/05/20 0004     Specimen Description  . BLOOD     Special Requests  RT HAND 10ML     Culture  NO GROWTH 6 DAYS    Culture, Blood 1 [[id number]]   Collected: 07/30/20 0147    Order Status: Completed  Specimen: Blood  Updated: 08/05/20 0004     Specimen Description  . BLOOD     Special Requests  LT HAND 10ML     Culture  NO GROWTH 6 DAYS    Culture, Blood 1 [[id number]]   Collected: 07/27/20 2028    Order Status: Completed  Specimen: Blood  Updated: 08/02/20 0459     Specimen Description  . BLOOD     Special Requests  NOT REPORTED     Culture  NO GROWTH 6 DAYS    Culture, Blood 1 [[id number]]   Collected: 07/27/20 2030    Order Status: Completed  Specimen: Blood  Updated: 08/02/20 0459     Specimen Description  . BLOOD     Special Requests  NOT REPORTED     Culture  NO GROWTH 6 DAYS        Culture, Blood 1 [[id number]]   Collected: 07/27/20 2028    Order Status: Completed  Specimen: Blood  Updated: 08/02/20 0459     Specimen Description  . BLOOD     Special Requests  NOT REPORTED     Culture  NO GROWTH 6 DAYS    Culture, Blood 1 [[id number]]   Collected: 07/27/20 2030    Order Status: Completed  Specimen: Blood  Updated: 08/02/20 0459     Specimen Description  . BLOOD     Special Requests  NOT REPORTED     Culture  NO GROWTH 6 DAYS      Gastrointestinal Panel, Molecular [[id number]]   Collected: 07/28/20 1220    Order Status: Completed  Specimen: Stool  Updated: 07/29/20 1542     Specimen Description  . FECES     Campylobacter PCR  NEGATIVE: No Campylobacter spp. (jejuni or coli) DNA Detected     Salmonella PCR  NEGATIVE: No Salmonella spp. DNA Detected     Shigatoxin Gene PCR  NEGATIVE: No Shiga toxin-producing gene(s) Detected     Shigella Sp PCR  NEGATIVE: No Shigella spp. / EIEC DNA Detected     Plesiomonas Shigelloides PCR  NEGATIVE: No Plesionomas shigelloides DNA Detected     Vibrio PCR  NEGATIVE: No Vibrio (V. vulnificus, V, parahaemolyticus and V. cholerae) DNA Detected     E Coli Enterotoxigenic PCR  NEGATIVE: No Enterotoxigenic E. coli (ETEC) Heat-labile and heat-stable (LT/ST) DNA Detected     Yersinia Enterocolitica PCR  NEGATIVE: No Yersinia enterocolitica DNA Detected    C DIFF TOXIN/ANTIGEN [[id number]]   Collected: 07/28/20 1220    Order Status: Completed  Specimen: Stool  Updated: 07/29/20 0850     Specimen Description  . FECES     C DIFF AG + TOXIN  NEGATIVE     Comment:  No C. difficile antigen and Toxin

## 2020-08-07 NOTE — PROGRESS NOTES
Chart reviewed. Discussed with nursing staff. Code status is now DNR-CC. Discussions regarding palliation and extubation are ongoing. We will follow from a distance. If there is a need for Nephrology intervention, please contact us.     Shaheen Mayo MD  Nephrology Attending Physician  [facility]

## 2020-08-07 NOTE — PLAN OF CARE
- Attempted to reach out to patient's family multiple times throughout the afternoon.  - Grace's voicemail is full. She did not answer 3 times and her phone went to voicemail on the fourth. - We are still awaiting to hear back from family regarding their final decision.     Jenna Small, [facility]

## 2020-08-08 NOTE — FLOWSHEET NOTE
Pt taken to [facility] with belongings- 1 pair black boots, 1 pair white socks, 1 cell phone and 1 set of keys on a [address] [facility].

## 2020-08-08 NOTE — PLAN OF CARE
Problem: Airway Clearance - Ineffective  Goal: Achieve or maintain patent airway  Outcome: Ongoing     Problem: Gas Exchange - Impaired  Goal: Absence of hypoxia  Outcome: Ongoing  Goal: Promote optimal lung function  Outcome: Ongoing     Problem: Breathing Pattern - Ineffective  Goal: Ability to achieve and maintain a regular respiratory rate  Outcome: Ongoing     Problem:  Body Temperature -  Risk of, Imbalanced  Goal: Ability to maintain a body temperature within defined limits  Outcome: Ongoing  Goal: Will regain or maintain usual level of consciousness  Outcome: Ongoing  Goal: Complications related to the disease process, condition or treatment will be avoided or minimized  Outcome: Ongoing     Problem: Isolation Precautions - Risk of Spread of Infection  Goal: Prevent transmission of infection  Outcome: Ongoing     Problem: Nutrition Deficits  Goal: Optimize nutrtional status  Outcome: Ongoing     Problem: Risk for Fluid Volume Deficit  Goal: Maintain normal heart rhythm  Outcome: Ongoing  Goal: Maintain absence of muscle cramping  Outcome: Ongoing  Goal: Maintain normal serum potassium, sodium, calcium, phosphorus, and pH  Outcome: Ongoing     Problem: Loneliness or Risk for Loneliness  Goal: Demonstrate positive use of time alone when socialization is not possible  Outcome: Ongoing     Problem: Fatigue  Goal: Verbalize increase energy and improved vitality  Outcome: Ongoing     Problem: Patient Education: Go to Patient Education Activity  Goal: Patient/Family Education  Outcome: Ongoing     Problem: Skin Integrity:  Goal: Will show no infection signs and symptoms  Description: Will show no infection signs and symptoms  Outcome: Ongoing  Goal: Absence of new skin breakdown  Description: Absence of new skin breakdown  Outcome: Ongoing     Problem: Falls - Risk of:  Goal: Will remain free from falls  Description: Will remain free from falls  Outcome: Ongoing  Goal: Absence of physical injury  Description: Absence of physical injury  Outcome: Ongoing     Problem: Confusion - Acute:  Goal: Absence of continued neurological deterioration signs and symptoms  Description: Absence of continued neurological deterioration signs and symptoms  Outcome: Ongoing  Goal: Mental status will be restored to baseline  Description: Mental status will be restored to baseline  Outcome: Ongoing     Problem: Discharge Planning:  Goal: Ability to perform activities of daily living will improve  Description: Ability to perform activities of daily living will improve  Outcome: Ongoing  Goal: Participates in care planning  Description: Participates in care planning  Outcome: Ongoing     Problem: Injury - Risk of, Physical Injury:  Goal: Will remain free from falls  Description: Will remain free from falls  Outcome: Ongoing  Goal: Absence of physical injury  Description: Absence of physical injury  Outcome: Ongoing     Problem: Mood - Altered:  Goal: Mood stable  Description: Mood stable  Outcome: Ongoing  Goal: Absence of abusive behavior  Description: Absence of abusive behavior  Outcome: Ongoing  Goal: Verbalizations of feeling emotionally comfortable while being cared for will increase  Description: Verbalizations of feeling emotionally comfortable while being cared for will increase  Outcome: Ongoing     Problem: Psychomotor Activity - Altered:  Goal: Absence of psychomotor disturbance signs and symptoms  Description: Absence of psychomotor disturbance signs and symptoms  Outcome: Ongoing     Problem: Sensory Perception - Impaired:  Goal: Demonstrations of improved sensory functioning will increase  Description: Demonstrations of improved sensory functioning will increase  Outcome: Ongoing  Goal: Decrease in sensory misperception frequency  Description: Decrease in sensory misperception frequency  Outcome: Ongoing  Goal: Able to refrain from responding to false sensory perceptions  Description: Able to refrain from responding to false sensory perceptions  Outcome: Ongoing  Goal: Demonstrates accurate environmental perceptions  Description: Demonstrates accurate environmental perceptions  Outcome: Ongoing  Goal: Able to distinguish between reality-based and nonreality-based thinking  Description: Able to distinguish between reality-based and nonreality-based thinking  Outcome: Ongoing  Goal: Able to interrupt nonreality-based thinking  Description: Able to interrupt nonreality-based thinking  Outcome: Ongoing     Problem: Sleep Pattern Disturbance:  Goal: Appears well-rested  Description: Appears well-rested  Outcome: Ongoing     Problem: HEMODYNAMIC STATUS  Goal: Patient has stable vital signs and fluid balance  Outcome: Ongoing     Problem: ACTIVITY INTOLERANCE/IMPAIRED MOBILITY  Goal: Mobility/activity is maintained at optimum level for patient  8/8/2020 0520 by Ernst Rubinstein, RN  Outcome: Ongoing  8/7/2020 1940 by Catrina Paige RCP  Outcome: Ongoing     Problem: COMMUNICATION IMPAIRMENT  Goal: Ability to express needs and understand communication  8/8/2020 0520 by Ernst Rubinstein, RN  Outcome: Ongoing  8/7/2020 1940 by Catrina Paige RCP  Outcome: Ongoing     Problem: OXYGENATION/RESPIRATORY FUNCTION  Goal: Patient will maintain patent airway  8/8/2020 0520 by Ernst Rubinstein, RN  Outcome: Ongoing  8/7/2020 1940 by Catrina Paige RCP  Outcome: Ongoing  Goal: Patient will achieve/maintain normal respiratory rate/effort  Description: Respiratory rate and effort will be within normal limits for the patient  8/8/2020 0520 by Ernst Rubinstein, RN  Outcome: Ongoing  8/7/2020 1940 by Catrina Paige RCP  Outcome: Ongoing     Problem: MECHANICAL VENTILATION  Goal: Mobility/activity is maintained at optimum level for patient  8/8/2020 0520 by Ernst Rubinstein, RN  Outcome: Ongoing  8/7/2020 1940 by Catrina Paige RCP  Outcome: Ongoing  Goal: Ability to express needs and understand communication  8/8/2020 0520 by Ernst Rubinstein, RN  Outcome: Ongoing  8/7/2020 1940 by Daniel Cornell RCP  Outcome: Ongoing  Goal: Patient will maintain patent airway  8/8/2020 0520 by Jose Miguel Zepeda RN  Outcome: Ongoing  8/7/2020 1940 by Daniel Cornell RCP  Outcome: Ongoing  Goal: Patient will maintain patent airway  8/8/2020 0520 by Jose Miguel Zepeda RN  Outcome: Ongoing  8/7/2020 1940 by Daniel Cornell RCP  Outcome: Ongoing  Goal: Oral health is maintained or improved  8/8/2020 0520 by Jose Miguel Zepeda RN  Outcome: Ongoing  8/7/2020 1940 by Daniel Cornell RCP  Outcome: Ongoing  Goal: ET tube will be managed safely  8/8/2020 0520 by Jose Miguel Zepeda RN  Outcome: Ongoing  8/7/2020 1940 by Daniel Cornell RCP  Outcome: Ongoing     Problem: SKIN INTEGRITY  Goal: Skin integrity is maintained or improved  Outcome: Ongoing     Problem: NUTRITION  Goal: Nutritional status is improving  Outcome: Ongoing

## 2020-08-08 NOTE — FLOWSHEET NOTE
Two RNs at bedside, Dr. Sahu Cleveland pronounced at [place] [id number], spiritual care and Physician contacted family,

## 2020-08-08 NOTE — PROGRESS NOTES
Progress Note    Patient - Issa Perez  Date of Admission -  2020  8:06 PM  Date of Evaluation -  2020  Room and Bed Number -  5607/5451-80   Hospital Day - 11    CHIEF COMPLAINT : covid -19 pneumonia    SUBJECTIVE:     OVERNIGHT EVENTS:         No clinical change. Yesterday, the family agreed to DNR CC arrest and withdrawal of support. Nurses report that at that time the ventilator was to be discontinued, the family, who was observing via video visit, perceived body movement on the part of the patient and interrupted the process. Patient remains on the ventilator. Moreover, the family now disputes the diagnosis of COVID 23 and wishes more proof. They also wish to review the MRI looking for conformation of the diagnosis of anoxic brain injury.   AWAKE & FOLLOWING COMMANDS:  [x] No   [] Yes    SECRETIONS Amount:  [x] Small [] Moderate  [] Large  [] None  Color:     [x] White [] Colored  [] Bloody    SEDATION:  RAAS Score:  [] Propofol gtt  [] Versed gtt  [] Ativan gtt   [x] No Sedation    PARALYZED:  [x] No    [] Yes    VASOPRESSORS:  [x] No    [] Yes  [] Levophed [] Dopamine [] Vasopressin  [] Dobutamine [] Phenylephrine [] Epinephrine      OBJECTIVE:     VITAL SIGNS:  /61   Pulse 82   Temp 99.7 °F (37.6 °C) (Esophageal)   Resp 18   Ht 5' 10\" (1.778 m)   Wt 165 lb 4.8 oz (75 kg)   SpO2 98%   BMI 23.72 kg/m²   Tmax over 24 hours:  Temp (24hrs), Av.6 °F (36.4 °C), Min:95.9 °F (35.5 °C), Max:99.7 °F (37.6 °C)      Patient Vitals for the past 8 hrs:   BP Temp Temp src Pulse Resp SpO2   20 1931 -- -- -- 82 18 98 %   20 1900 139/61 -- -- 84 18 97 %   20 1800 135/60 -- -- 82 19 97 %   20 1700 (!) 136/58 -- -- 83 17 98 %   20 1600 (!) 126/49 99.7 °F (37.6 °C) Esophageal 85 22 97 %   20 1500 (!) 133/51 -- -- 88 22 98 %   20 1400 (!) 137/55 -- -- 82 16 98 %   20 1300 (!) 141/60 -- -- 82 18 99 %         Intake/Output Summary (Last 24 hours) at 8/7/2020 2042  Last data filed at 8/7/2020 1600  Gross per 24 hour   Intake 1073 ml   Output 465 ml   Net 608 ml     Date 08/07/20 0000 - 08/07/20 2359   Shift 8253-0354 6724-1803 6686-7382 24 Hour Total   INTAKE   I.V.(mL/kg) 204(2.7) 166(2.2) 121(1.6) 491(6.5)   NG/GT(mL/kg) 135(1.8) 278(3.7) 169(2.3) 582(7.8)   Shift Total(mL/kg) 339(4.5) 444(5.9) 290(3.9) 4074(53.4)   OUTPUT   Urine(mL/kg/hr) 160(0.3) 230(0.4) 75 465   Shift Total(mL/kg) 160(2.1) 230(3.1) 75(1) 465(6.2)   Weight (kg) 75 75 75 75     Wt Readings from Last 3 Encounters:   08/05/20 165 lb 4.8 oz (75 kg)     Body mass index is 23.72 kg/m².         PHYSICAL EXAM:  Unresponsive       MEDICATIONS:  Scheduled Meds:   hydrocortisone sodium succinate PF  25 mg Intravenous Q8H    aspirin  81 mg Oral Daily    famotidine  20 mg Oral Daily    insulin lispro  0-12 Units Subcutaneous Q6H    insulin glargine  10 Units Subcutaneous Nightly    levothyroxine  50 mcg Intravenous Daily    And    sodium chloride (PF)  5 mL Intravenous Daily    sodium chloride flush  10 mL Intravenous 2 times per day    sodium chloride flush  10 mL Intravenous 2 times per day    atorvastatin  40 mg Oral Nightly     Continuous Infusions:   norepinephrine Stopped (08/06/20 0151)    amiodarone 0.5 mg/min (08/07/20 0757)    furosemide (LASIX) 5mg/ml infusion 40 mg/hr (08/07/20 1258)    dextrose       PRN Meds:   LORazepam, 0.5 mg, Q30 Min PRN    Or  LORazepam, 1 mg, Q30 Min PRN  glycopyrrolate, 0.2 mg, Q4H PRN  fentanNYL, 25 mcg, Q30 Min PRN    Or  fentanNYL, 50 mcg, Q30 Min PRN    Or  fentanNYL, 100 mcg, Q30 Min PRN  artificial tears, , PRN  metoprolol, 5 mg, Q4H PRN  metoprolol, 2.5 mg, Q6H PRN  potassium chloride, 20 mEq, PRN  sodium chloride flush, 10 mL, PRN  acetaminophen, 650 mg, Q6H PRN    Or  acetaminophen, 650 mg, Q6H PRN  polyethylene glycol, 17 g, Daily PRN  promethazine, 12.5 mg, Q6H PRN    Or  ondansetron, 4 mg, Q6H PRN  nicotine, 1 patch, Daily PRN  LORazepam, 2 204* 217* 237*   CREATININE 11.76* 12.34* 13.50*   GLUCOSE 92 92 184*   CALCIUM 6.9* 6.5* 6.5*      Magnesium:   Lab Results   Component Value Date    MG 2.7 08/06/2020    MG 1.4 07/31/2020    MG 1.5 07/30/2020     Phosphorus:   Lab Results   Component Value Date    PHOS 2.3 07/30/2020     Ionized Calcium: No results found for: CAION     Urinalysis:   Lab Results   Component Value Date    NITRU NEGATIVE 07/27/2020    COLORU YELLOW 07/27/2020    PHUR 5.0 07/27/2020    WBCUA None 07/27/2020    RBCUA 0 TO 2 07/27/2020    MUCUS NOT REPORTED 07/27/2020    TRICHOMONAS NOT REPORTED 07/27/2020    YEAST NOT REPORTED 07/27/2020    BACTERIA NOT REPORTED 07/27/2020    SPECGRAV 1.052 07/27/2020    LEUKOCYTESUR NEGATIVE 07/27/2020    UROBILINOGEN Normal 07/27/2020    BILIRUBINUR NEGATIVE 07/27/2020    GLUCOSEU NEGATIVE 07/27/2020    KETUA NEGATIVE 07/30/2020    AMORPHOUS NOT REPORTED 07/27/2020       HgBA1c:    Lab Results   Component Value Date    LABA1C 5.3 07/28/2020     TSH:    Lab Results   Component Value Date    TSH 0.01 08/06/2020       Xr Chest (single View Frontal)    Result Date: 7/30/2020  Satisfactory position of the lines and catheters. Cardiomegaly with perihilar edema. Ct Head Wo Contrast    Result Date: 7/27/2020  No acute intracranial abnormality. Findings were communicated electronically to Dr. Hermelinda Regalado through the radiology results communication center. Us Renal Complete    Result Date: 7/30/2020  Increased cortical echogenicity consistent with medical renal disease. No hydronephrosis or nephrolithiasis. Increased fluid in the bowel. Xr Chest Portable    Result Date: 7/29/2020  Right IJ central line appears in satisfactory position without evidence of pneumothorax. Endotracheal tube also appears in satisfactory position. Ct Chest Pulmonary Embolism W Contrast    Result Date: 7/27/2020  No evidence of pulmonary embolism or acute pulmonary abnormality.      Cta Head Neck W Contrast    Result Date:

## 2020-08-08 NOTE — FLOWSHEET NOTE
Assessment:  Patient's daughter Rupert Able [phone] called regarding patient's belongings and to give name of the  home.  called Security. B     20 1225   Encounter Summary   Services provided to: Samantha Samayoa   (2020)   Complexity of Encounter Low   Length of Encounter 15 minutes   Routine   Type Follow up   elongings with patient in the morgue. Security calling to determine how to release belongings to daughter.

## 2020-08-08 NOTE — DISCHARGE SUMMARY
J[facility]    Discharge Summary     Patient ID: Lex Corrales  :  1960   MRN: [id number]     ACCOUNT:  [de-identified]   Patient's PCP: No primary care provider on file. Admit Date: 2020   Discharge Date: 2020    Length of Stay: 12  Code Status:  DNR-CC  Admitting Physician: Lois Oseguera DO  Discharge Physician: Sandy Beverly DO     Active Discharge Diagnoses:     Hospital Problem Lists:  Principal Problem:    COVID-19  Active Problems:    Toxic metabolic encephalopathy    Secondary hypothyroidism    History of pituitary tumor    Seizure ([address]    Somnolence    Shock circulatory ([address]    Pituitary insufficiency ([address]    High anion gap metabolic acidosis    New onset atrial fibrillation ([address]    Pulseless electrical activity ([address]    LEILA (acute kidney injury) ([address]    Anoxic encephalopathy ([address]    Palliative care encounter  Resolved Problems:    * No resolved hospital problems. *      Admission Condition:  poor     Discharged Condition:     Hospital Stay:     Hospital Course:    Mr. Lex Corrales is a 60year old  male 2020 for treatment of COVID-19 virus infection. Patient was brought to emergency room with altered mental status. He was at work and was found on the ground several hours later. Patient was confused, drowsy and not answering questions. He did not have any focal weakness. Evaluation in ER showed temperature 102. Lab evaluation showed low cortisol level, low TSH. COVID-19 test was positive. CT chest was negative for PE and did not show any pneumonia. Neurology was consulted for possible TIA Vs syncope. CT head was negative for acute IC abnormality. Patient has history of pituitary adenoma S/P resection in 10/2016 at [facility] by Dr. Valentine Ill had failed to follow-up with the clinic.  Patient follows with Dr. Jas Singh MD. It appears from outpatient note with PCP on 5/13/2020 that patient has not been taking his medications. Skylar Perez has not been following his endocrinologist. Skylar Perez has chronic pituitary insufficiency and was supposed to be on hydrocortisone, desmopressin, levothyroxine, testosterone and diabetic medications. Skylar Perez has lost 50 pounds and had stopped all medications. Patient was found to have  decrease cortisol and TSH levels. Patient was lethargic and confused. He had PEA arrest on 7/29/2020 with successful ROSC after couple rounds of CPR followed by new onset CHANCE meza. Patient was given amiodarone 150 mg dose and converted normal sinus rhythm.  He had more episodes of multiple PEA arrest successfully resuscitated with CPR.   Patient had circulatory shock and needed intubation, pressor support with levophed, epinephrine. Patient had severe anion gap metabolic acidosis  treated with bicarb bolus and infusion.  Patient was started on IV Solu-Cortef, IV levothyroxine for pituitary insufficiency. Levophed was gradually weaned blood pressure improved. However, from a neurologic standpoint, patient did not recover. Neurology was involved and patient underwent EEG and MRI. Both of these suggested severe anoxic brain injury. Patient has been off sedation for several days. He had no purposeful movements. His only reflex was slight pupillary reflex and brainstem reflex, as he was breathing over the ventilator at times. Palliative care and ethics were both involved in the patient's care. His renal function continued to deteriorate nephrology was involved. He is maintained on Lasix infusion. He also went into A. susana RVR and was evaluated by cardiology and placed on amiodarone infusion. There are multiple discussions with family members about withdrawal of care. Patient's family understands that their loved one is suffering, and has a very very grave prognosis.   Patient's family has a significant dynamic as there are multiple children, all of whom do not speak with discharge and this discharge summary is in conjunction with any daily progress note from day of discharge. Discharge plan:     Disposition:     Time Spent on discharge is  50 mins in patient examination, evaluation, counseling as well as medication reconciliation, prescriptions for required medications, discharge plan and follow up. Electronically signed by   Margot Reyan DO  2020  7:33 AM      Thank you Dr. Mele Ortiz primary care provider on file. for the opportunity to be involved in this patient's care.

## 2020-08-08 NOTE — FLOWSHEET NOTE
contact spiritual care for [address] [place] [address]. Direct Primary Phone number was given to [place].      Electronically signed by Myra Mead, on 8/8/2020 at 9:00 AM.  [facility]  [phone]       08/08/20 5944   Encounter Summary   Services provided to: Family   Support System Children   Continue Visiting   (8/7/20)   Complexity of Encounter High   Length of Encounter 1 hour   Routine   Type Follow up   Assessment Grieving   Intervention Active listening   Outcome Comfort
